# Patient Record
Sex: MALE | Race: OTHER | Employment: OTHER | URBAN - METROPOLITAN AREA
[De-identification: names, ages, dates, MRNs, and addresses within clinical notes are randomized per-mention and may not be internally consistent; named-entity substitution may affect disease eponyms.]

---

## 2021-06-15 ENCOUNTER — OFFICE VISIT (OUTPATIENT)
Dept: INTERNAL MEDICINE CLINIC | Age: 81
End: 2021-06-15
Payer: MEDICARE

## 2021-06-15 VITALS
BODY MASS INDEX: 31.76 KG/M2 | OXYGEN SATURATION: 96 % | WEIGHT: 186 LBS | SYSTOLIC BLOOD PRESSURE: 124 MMHG | TEMPERATURE: 98 F | DIASTOLIC BLOOD PRESSURE: 68 MMHG | HEART RATE: 64 BPM | HEIGHT: 64 IN

## 2021-06-15 DIAGNOSIS — I10 ESSENTIAL HYPERTENSION: Primary | ICD-10-CM

## 2021-06-15 DIAGNOSIS — M47.817 OSTEOARTHRITIS OF LUMBOSACRAL SPINE: ICD-10-CM

## 2021-06-15 DIAGNOSIS — Z96.653 HISTORY OF BILATERAL KNEE REPLACEMENT: ICD-10-CM

## 2021-06-15 DIAGNOSIS — M47.12 OSTEOARTHRITIS OF CERVICAL SPINE WITH MYELOPATHY: ICD-10-CM

## 2021-06-15 PROCEDURE — 99205 OFFICE O/P NEW HI 60 MIN: CPT | Performed by: INTERNAL MEDICINE

## 2021-06-15 RX ORDER — GABAPENTIN 400 MG/1
400 CAPSULE ORAL 3 TIMES DAILY
COMMUNITY
End: 2021-07-13 | Stop reason: SDUPTHER

## 2021-06-15 RX ORDER — CELECOXIB 100 MG/1
100 CAPSULE ORAL 2 TIMES DAILY
COMMUNITY
End: 2021-06-15 | Stop reason: ALTCHOICE

## 2021-06-15 RX ORDER — HYDROCODONE BITARTRATE AND ACETAMINOPHEN 10; 325 MG/1; MG/1
1 TABLET ORAL EVERY 6 HOURS PRN
Qty: 120 TABLET | Refills: 0 | Status: SHIPPED | OUTPATIENT
Start: 2021-06-15 | End: 2021-07-13 | Stop reason: SDUPTHER

## 2021-06-15 RX ORDER — ALBUTEROL SULFATE 90 UG/1
2 AEROSOL, METERED RESPIRATORY (INHALATION) EVERY 4 HOURS PRN
COMMUNITY
Start: 2021-04-19 | End: 2021-07-01 | Stop reason: SDUPTHER

## 2021-06-15 RX ORDER — HYDROCODONE BITARTRATE AND ACETAMINOPHEN 10; 325 MG/1; MG/1
1 TABLET ORAL EVERY 6 HOURS PRN
COMMUNITY
End: 2021-06-15 | Stop reason: SDUPTHER

## 2021-06-15 RX ORDER — LISINOPRIL 20 MG/1
20 TABLET ORAL DAILY
COMMUNITY
End: 2021-07-13 | Stop reason: SDUPTHER

## 2021-06-15 RX ORDER — CELECOXIB 100 MG/1
100 CAPSULE ORAL 2 TIMES DAILY
COMMUNITY
Start: 2021-05-13 | End: 2021-07-21 | Stop reason: ALTCHOICE

## 2021-06-15 RX ORDER — SIMVASTATIN 40 MG
40 TABLET ORAL NIGHTLY
COMMUNITY
End: 2021-07-13 | Stop reason: SDUPTHER

## 2021-06-15 RX ORDER — ALBUTEROL SULFATE 1.25 MG/3ML
1 SOLUTION RESPIRATORY (INHALATION) EVERY 6 HOURS PRN
COMMUNITY

## 2021-06-15 SDOH — ECONOMIC STABILITY: FOOD INSECURITY: WITHIN THE PAST 12 MONTHS, THE FOOD YOU BOUGHT JUST DIDN'T LAST AND YOU DIDN'T HAVE MONEY TO GET MORE.: NEVER TRUE

## 2021-06-15 SDOH — ECONOMIC STABILITY: FOOD INSECURITY: WITHIN THE PAST 12 MONTHS, YOU WORRIED THAT YOUR FOOD WOULD RUN OUT BEFORE YOU GOT MONEY TO BUY MORE.: NEVER TRUE

## 2021-06-15 SDOH — HEALTH STABILITY: PHYSICAL HEALTH: ON AVERAGE, HOW MANY MINUTES DO YOU ENGAGE IN EXERCISE AT THIS LEVEL?: 0 MIN

## 2021-06-15 SDOH — ECONOMIC STABILITY: TRANSPORTATION INSECURITY
IN THE PAST 12 MONTHS, HAS THE LACK OF TRANSPORTATION KEPT YOU FROM MEDICAL APPOINTMENTS OR FROM GETTING MEDICATIONS?: NO

## 2021-06-15 SDOH — ECONOMIC STABILITY: TRANSPORTATION INSECURITY
IN THE PAST 12 MONTHS, HAS LACK OF TRANSPORTATION KEPT YOU FROM MEETINGS, WORK, OR FROM GETTING THINGS NEEDED FOR DAILY LIVING?: NO

## 2021-06-15 SDOH — HEALTH STABILITY: PHYSICAL HEALTH: ON AVERAGE, HOW MANY DAYS PER WEEK DO YOU ENGAGE IN MODERATE TO STRENUOUS EXERCISE (LIKE A BRISK WALK)?: 0 DAYS

## 2021-06-15 ASSESSMENT — PATIENT HEALTH QUESTIONNAIRE - PHQ9
SUM OF ALL RESPONSES TO PHQ9 QUESTIONS 1 & 2: 0
SUM OF ALL RESPONSES TO PHQ QUESTIONS 1-9: 0
1. LITTLE INTEREST OR PLEASURE IN DOING THINGS: 0
SUM OF ALL RESPONSES TO PHQ QUESTIONS 1-9: 0
SUM OF ALL RESPONSES TO PHQ QUESTIONS 1-9: 0
2. FEELING DOWN, DEPRESSED OR HOPELESS: 0

## 2021-06-15 ASSESSMENT — LIFESTYLE VARIABLES
HOW OFTEN DO YOU HAVE A DRINK CONTAINING ALCOHOL: 2-3 TIMES A WEEK
HOW MANY STANDARD DRINKS CONTAINING ALCOHOL DO YOU HAVE ON A TYPICAL DAY: 1 OR 2

## 2021-06-15 ASSESSMENT — SOCIAL DETERMINANTS OF HEALTH (SDOH): HOW HARD IS IT FOR YOU TO PAY FOR THE VERY BASICS LIKE FOOD, HOUSING, MEDICAL CARE, AND HEATING?: NOT HARD AT ALL

## 2021-06-15 NOTE — PROGRESS NOTES
Subjective:      Patient ID: Betty Escalante is a [de-identified] y.o. male. Hypertension  This is a chronic problem. The current episode started more than 1 year ago. The problem is unchanged. The problem is controlled. Associated symptoms include anxiety and neck pain. There are no associated agents to hypertension. Risk factors for coronary artery disease include dyslipidemia and obesity. Past treatments include ACE inhibitors. The current treatment provides moderate improvement. Review of Systems   Musculoskeletal: Positive for neck pain. Positive and Negative as described in HPI    Constitutional:  negative for  fevers, chills, sweats, fatigue, and weight loss  HEENT:  negative for vision or hearing changes,   Respiratory:  negative for shortness of breath, cough, or congestion  Cardiovascular:  negative for  chest pain, palpitations, edema  Gastrointestinal:  negative for nausea, vomiting, diarrhea, constipation, abdominal pain  Genitourinary:  negative for frequency, dysuria  Integument/Breast:  negative for rash, skin lesions,   Musculoskeletal: Chronic pain due to lumbar cervical and knee osteoarthritis has been on narcotic therapy for 15 to 20 years  Neurological:  negative for headaches, dizziness, numbness, pain and tingling extremities  Behavior/Psych:  negative for depression and anxiety  No family history on file. Social History     Socioeconomic History    Marital status:      Spouse name: None    Number of children: None    Years of education: None    Highest education level: None   Occupational History    None   Tobacco Use    Smoking status: Former Smoker     Packs/day: 1.50     Years: 20.00     Pack years: 30.00     Quit date:      Years since quittin.4    Smokeless tobacco: Never Used   Substance and Sexual Activity    Alcohol use:  Yes     Alcohol/week: 1.0 standard drinks     Types: 1 Glasses of wine per week     Comment: COUPLE TIMES A WEEK WITH DINNER    Drug use: knee replacement     3. Osteoarthritis of lumbosacral spine  HYDROcodone-acetaminophen (NORCO)  MG per tablet   4. Osteoarthritis of cervical spine with myelopathy  HYDROcodone-acetaminophen (NORCO)  MG per tablet     Return in about 4 weeks (around 7/13/2021) for Follow Up. Orders Placed This Encounter   Procedures    Lipid Panel     Order Specific Question:   Is Patient Fasting?/# of Hours     Answer:   fasting    Comprehensive Metabolic Panel     Standing Status:   Future     Standing Expiration Date:   6/15/2022    TSH without Reflex     Standing Status:   Future     Standing Expiration Date:   6/15/2022    Sedimentation rate, automated     Standing Status:   Future     Standing Expiration Date:   6/15/2022    C-Reactive Protein     Standing Status:   Future     Standing Expiration Date:   6/15/2022     Orders Placed This Encounter   Medications    HYDROcodone-acetaminophen (NORCO)  MG per tablet     Sig: Take 1 tablet by mouth every 6 hours as needed for Pain for up to 30 days. Dispense:  120 tablet     Refill:  0     Reduce doses taken as pain becomes manageable      Visit Information    Have you changed or started any medications since your last visit including any over-the-counter medicines, vitamins, or herbal medicines? no   Are you having any side effects from any of your medications? -  no  Have you stopped taking any of your medications? Is so, why? -  no    Have you seen any other physician or provider since your last visit? Yes - Records Obtained  Have you had any other diagnostic tests since your last visit? No  Have you been seen in the emergency room and/or had an admission to a hospital since we last saw you? No  Have you had your routine dental cleaning in the past 6 months? no    Have you activated your [employer] account? If not, what are your barriers?  No:      Patient Care Team:  Manju Giang MD as PCP - General (Internal Medicine)    Medical History Review  Past Medical, Family, and Social History reviewed and does not contribute to the patient presenting condition    Health Maintenance   Topic Date Due    COVID-19 Vaccine (1) Never done    DTaP/Tdap/Td vaccine (1 - Tdap) Never done    Shingles Vaccine (1 of 2) Never done    Pneumococcal 65+ years Vaccine (1 of 1 - PPSV23) Never done    Flu vaccine (Season Ended) 09/01/2021    Hepatitis A vaccine  Aged Out    Hepatitis B vaccine  Aged Out    Hib vaccine  Aged Out    Meningococcal (ACWY) vaccine  Aged [employer] that he has been taking hydrocodone for his pain for the past 10 to 15 years he takes 4 pills/day he has no significant side effects from. His CBC done in May was within normal limits. Was advised that he is due for laboratory work-up including CMP lipid profile TSH sed rate CRP I will recheck him in 4 weeks.   It is to be noted the patient previous smoker has no history of significant alcohol use he just moved to [place] from [place]

## 2021-06-16 ENCOUNTER — HOSPITAL ENCOUNTER (OUTPATIENT)
Age: 81
Setting detail: SPECIMEN
Discharge: HOME | End: 2021-06-16
Payer: MEDICARE

## 2021-06-16 ENCOUNTER — TELEPHONE (OUTPATIENT)
Dept: INTERNAL MEDICINE CLINIC | Age: 81
End: 2021-06-16

## 2021-06-16 DIAGNOSIS — I10 ESSENTIAL HYPERTENSION: ICD-10-CM

## 2021-06-16 LAB
ALBUMIN SERPL-MCNC: 4.1 G/DL (ref 3.5–5.2)
ALBUMIN/GLOBULIN RATIO: 1.5 (ref 1–2.5)
ALP BLD-CCNC: 79 U/L (ref 40–129)
ALT SERPL-CCNC: 9 U/L (ref 5–41)
ANION GAP SERPL CALCULATED.3IONS-SCNC: 12 MMOL/L (ref 9–17)
AST SERPL-CCNC: 17 U/L
BILIRUB SERPL-MCNC: 0.26 MG/DL (ref 0.3–1.2)
BUN BLDV-MCNC: 16 MG/DL (ref 8–23)
BUN/CREAT BLD: ABNORMAL (ref 9–20)
C-REACTIVE PROTEIN: <3 MG/L (ref 0–5)
CALCIUM SERPL-MCNC: 9.1 MG/DL (ref 8.6–10.4)
CHLORIDE BLD-SCNC: 100 MMOL/L (ref 98–107)
CO2: 26 MMOL/L (ref 20–31)
CREAT SERPL-MCNC: 0.85 MG/DL (ref 0.7–1.2)
GFR AFRICAN AMERICAN: >60 ML/MIN
GFR NON-AFRICAN AMERICAN: >60 ML/MIN
GFR SERPL CREATININE-BSD FRML MDRD: ABNORMAL ML/MIN/{1.73_M2}
GFR SERPL CREATININE-BSD FRML MDRD: ABNORMAL ML/MIN/{1.73_M2}
GLUCOSE BLD-MCNC: 92 MG/DL (ref 70–99)
POTASSIUM SERPL-SCNC: 4.8 MMOL/L (ref 3.7–5.3)
SEDIMENTATION RATE, ERYTHROCYTE: 4 MM (ref 0–20)
SODIUM BLD-SCNC: 138 MMOL/L (ref 135–144)
TOTAL PROTEIN: 6.8 G/DL (ref 6.4–8.3)
TSH SERPL DL<=0.05 MIU/L-ACNC: 1.39 MIU/L (ref 0.3–5)

## 2021-06-17 NOTE — TELEPHONE ENCOUNTER
Letter written and placed at the  for pickup. Patient notified and verbalized understanding. He agreed to  letter today or tomorrow.

## 2021-06-28 NOTE — TELEPHONE ENCOUNTER
Medication: Advair  Last visit: 6/15/21  Next visit: 7/21/2021  Last refill: Not filled by our office before  Pharmacy: [address] [facility] pt also needs a prescription to go to [facility] for [employer] however I don't see on medication list.    Please advise.

## 2021-06-30 ENCOUNTER — TELEPHONE (OUTPATIENT)
Dept: INTERNAL MEDICINE CLINIC | Age: 81
End: 2021-06-30

## 2021-06-30 NOTE — TELEPHONE ENCOUNTER
[facility] calling to inform that the albuterol inhaler Is requiring a prior authorization.   PA #[id number]

## 2021-07-01 RX ORDER — ALBUTEROL SULFATE 90 UG/1
2 AEROSOL, METERED RESPIRATORY (INHALATION) 4 TIMES DAILY
Qty: 1 INHALER | Refills: 2 | Status: SHIPPED | OUTPATIENT
Start: 2021-07-01 | End: 2021-07-13 | Stop reason: SDUPTHER

## 2021-07-01 NOTE — TELEPHONE ENCOUNTER
----- Message from Kevin Narayanan MA sent at 7/1/2021  9:20 AM EDT -----  Subject: Refill Request    QUESTIONS  Name of Medication? albuterol sulfate  (90 Base) MCG/ACT inhaler  Patient-reported dosage and instructions? 108  How many days do you have left? 2  Preferred Pharmacy? [address] phone number (if available)? [phone]  ---------------------------------------------------------------------------  --------------  CALL BACK INFO  What is the best way for the office to contact you? OK to leave message on   voicemail  Preferred Call Back Phone Number?  [phone]

## 2021-07-02 NOTE — TELEPHONE ENCOUNTER
LVM for patient to contact pharmacy or insurance to ask for an alternative.  Advised to return office call with alternative and I will send for provider approval.

## 2021-07-08 DIAGNOSIS — F17.200 TOBACCO DEPENDENCY: Primary | ICD-10-CM

## 2021-07-08 NOTE — TELEPHONE ENCOUNTER
[facility] requesting clarification for Advair. Rx is 1 puff daily, Pharmacy states this medication is typically 1 puff BID. Is the original Rx correct?     Please advise

## 2021-07-13 DIAGNOSIS — M47.12 OSTEOARTHRITIS OF CERVICAL SPINE WITH MYELOPATHY: ICD-10-CM

## 2021-07-13 DIAGNOSIS — M47.817 OSTEOARTHRITIS OF LUMBOSACRAL SPINE: ICD-10-CM

## 2021-07-13 RX ORDER — GABAPENTIN 400 MG/1
400 CAPSULE ORAL 3 TIMES DAILY
Qty: 90 CAPSULE | Refills: 1 | Status: SHIPPED | OUTPATIENT
Start: 2021-07-13 | End: 2021-09-14

## 2021-07-13 RX ORDER — ALBUTEROL SULFATE 90 UG/1
2 AEROSOL, METERED RESPIRATORY (INHALATION) 4 TIMES DAILY
Qty: 3 INHALER | Refills: 3 | Status: SHIPPED | OUTPATIENT
Start: 2021-07-13 | End: 2022-05-05

## 2021-07-13 RX ORDER — SIMVASTATIN 40 MG
40 TABLET ORAL NIGHTLY
Qty: 90 TABLET | Refills: 1 | Status: SHIPPED | OUTPATIENT
Start: 2021-07-13 | End: 2021-12-29

## 2021-07-13 RX ORDER — HYDROCODONE BITARTRATE AND ACETAMINOPHEN 10; 325 MG/1; MG/1
1 TABLET ORAL EVERY 6 HOURS PRN
Qty: 120 TABLET | Refills: 0 | Status: SHIPPED | OUTPATIENT
Start: 2021-07-13 | End: 2021-08-14 | Stop reason: SDUPTHER

## 2021-07-13 RX ORDER — LISINOPRIL 20 MG/1
20 TABLET ORAL DAILY
Qty: 90 TABLET | Refills: 1 | Status: SHIPPED | OUTPATIENT
Start: 2021-07-13 | End: 2021-12-29

## 2021-07-21 ENCOUNTER — OFFICE VISIT (OUTPATIENT)
Dept: INTERNAL MEDICINE CLINIC | Age: 81
End: 2021-07-21
Payer: MEDICARE

## 2021-07-21 VITALS
OXYGEN SATURATION: 97 % | HEART RATE: 60 BPM | SYSTOLIC BLOOD PRESSURE: 122 MMHG | BODY MASS INDEX: 30.73 KG/M2 | DIASTOLIC BLOOD PRESSURE: 62 MMHG | WEIGHT: 180 LBS | HEIGHT: 64 IN

## 2021-07-21 DIAGNOSIS — I10 ESSENTIAL HYPERTENSION: ICD-10-CM

## 2021-07-21 DIAGNOSIS — Z13.220 SCREENING FOR HYPERLIPIDEMIA: Primary | ICD-10-CM

## 2021-07-21 DIAGNOSIS — M47.12 OSTEOARTHRITIS OF CERVICAL SPINE WITH MYELOPATHY: ICD-10-CM

## 2021-07-21 DIAGNOSIS — M47.817 OSTEOARTHRITIS OF LUMBOSACRAL SPINE: ICD-10-CM

## 2021-07-21 PROCEDURE — 99214 OFFICE O/P EST MOD 30 MIN: CPT | Performed by: INTERNAL MEDICINE

## 2021-07-21 PROCEDURE — G8417 CALC BMI ABV UP PARAM F/U: HCPCS | Performed by: INTERNAL MEDICINE

## 2021-07-21 PROCEDURE — 3288F FALL RISK ASSESSMENT DOCD: CPT | Performed by: INTERNAL MEDICINE

## 2021-07-21 PROCEDURE — G8427 DOCREV CUR MEDS BY ELIG CLIN: HCPCS | Performed by: INTERNAL MEDICINE

## 2021-07-21 PROCEDURE — G0439 PPPS, SUBSEQ VISIT: HCPCS | Performed by: INTERNAL MEDICINE

## 2021-07-21 PROCEDURE — 1036F TOBACCO NON-USER: CPT | Performed by: INTERNAL MEDICINE

## 2021-07-21 PROCEDURE — 4040F PNEUMOC VAC/ADMIN/RCVD: CPT | Performed by: INTERNAL MEDICINE

## 2021-07-21 PROCEDURE — 1123F ACP DISCUSS/DSCN MKR DOCD: CPT | Performed by: INTERNAL MEDICINE

## 2021-07-21 ASSESSMENT — PATIENT HEALTH QUESTIONNAIRE - PHQ9
2. FEELING DOWN, DEPRESSED OR HOPELESS: 0
1. LITTLE INTEREST OR PLEASURE IN DOING THINGS: 0
SUM OF ALL RESPONSES TO PHQ QUESTIONS 1-9: 0
2. FEELING DOWN, DEPRESSED OR HOPELESS: 0
SUM OF ALL RESPONSES TO PHQ QUESTIONS 1-9: 0
SUM OF ALL RESPONSES TO PHQ9 QUESTIONS 1 & 2: 0
SUM OF ALL RESPONSES TO PHQ QUESTIONS 1-9: 0
SUM OF ALL RESPONSES TO PHQ9 QUESTIONS 1 & 2: 0
1. LITTLE INTEREST OR PLEASURE IN DOING THINGS: 0

## 2021-07-21 ASSESSMENT — ENCOUNTER SYMPTOMS
RESPIRATORY NEGATIVE: 1
GASTROINTESTINAL NEGATIVE: 1
EYES NEGATIVE: 1

## 2021-07-21 ASSESSMENT — LIFESTYLE VARIABLES
AUDIT TOTAL SCORE: INCOMPLETE
HOW OFTEN DO YOU HAVE A DRINK CONTAINING ALCOHOL: NEVER
AUDIT-C TOTAL SCORE: INCOMPLETE
HOW OFTEN DO YOU HAVE A DRINK CONTAINING ALCOHOL: 0

## 2021-07-21 NOTE — PROGRESS NOTES
Subjective:      Patient ID: Sunshine Vitale is a [de-identified] y.o. male. Hypertension  This is a chronic problem. The current episode started more than 1 year ago. The problem is unchanged. The problem is controlled. Associated symptoms include anxiety and neck pain. There are no associated agents to hypertension. Risk factors for coronary artery disease include dyslipidemia and obesity. Past treatments include ACE inhibitors. The current treatment provides moderate improvement. Review of Systems   Constitutional: Negative. HENT: Negative. Eyes: Negative. Respiratory: Negative. Cardiovascular: Negative. Gastrointestinal: Negative. Endocrine: Negative. Genitourinary: Negative. Musculoskeletal: Positive for neck pain. Objective:   Physical Exam hysical Exam CONSTITUTIONAL: Alert and oriented   HEENT: Atraumatic, conjunctiva normal colored, no jaundice  NECK: No thyroid enlargement, no lymphadenopathy, no JVD   CHEST: Breath sounds normal, no wheezing, no rales   CVS: S1 S2 normal, no murmur, no gallop, no carotid bruit  ABDOMEN: Soft, non tender, no mass, no hepatosplenomegaly   NEUROLOGICAL: Alert, CN 2-12 intact, no motor deficits   EXTREMITIES: No pedal edema, no calf tenderness= osteoarthritis multiple joints including knees shoulders  cervical spine and lumbar region  VASCULAR: Aorta not palpable, femorals 2+,   SKIN: No obvious lesions or rash       Assessment / Plan:      Diagnosis Orders   1. Screening for hyperlipidemia  Lipid Panel   2. Essential hypertension     3. Osteoarthritis of cervical spine with myelopathy     4. Osteoarthritis of lumbosacral spine     Return in about 12 weeks (around 10/13/2021) for Follow Up. No orders of the defined types were placed in this encounter. No orders of the defined types were placed in this encounter.           Visit Information    Have you changed or started any medications since your last visit including any over-the-counter medicines, vitamins, or herbal medicines? no   Are you having any side effects from any of your medications? -  no  Have you stopped taking any of your medications? Is so, why? -  no    Have you seen any other physician or provider since your last visit? No  Have you had any other diagnostic tests since your last visit? No  Have you been seen in the emergency room and/or had an admission to a hospital since we last saw you? No  Have you had your routine dental cleaning in the past 6 months? no    Have you activated your [employer] account? If not, what are your barriers?  No:      Patient Care Team:  Colt Portillo MD as PCP - General (Internal Medicine)  Colt Portillo MD as PCP - [facility]    Medical History Review  Past Medical, Family, and Social History reviewed and does not contribute to the patient presenting condition    Health Maintenance   Topic Date Due    Lipid screen  Never done    COVID-19 Vaccine (1) Never done    DTaP/Tdap/Td vaccine (1 - Tdap) Never done    Shingles Vaccine (1 of 2) Never done    Pneumococcal 65+ years Vaccine (1 of 1 - PPSV23) Never done    Annual Wellness Visit (AWV)  Never done    Flu vaccine (1) 09/01/2021    Potassium monitoring  06/16/2022    Creatinine monitoring  06/16/2022    Hepatitis A vaccine  Aged Out    Hepatitis B vaccine  Aged Out    Hib vaccine  Aged Out    Meningococcal (ACWY) vaccine  Aged Out

## 2021-08-12 DIAGNOSIS — M47.12 OSTEOARTHRITIS OF CERVICAL SPINE WITH MYELOPATHY: ICD-10-CM

## 2021-08-12 DIAGNOSIS — M47.817 OSTEOARTHRITIS OF LUMBOSACRAL SPINE: ICD-10-CM

## 2021-08-12 NOTE — TELEPHONE ENCOUNTER
----- Message from Lorie Gerald sent at 8/12/2021 12:24 PM EDT -----  Subject: Refill Request    QUESTIONS  Name of Medication? HYDROcodone-acetaminophen (NORCO)  MG per tablet  Patient-reported dosage and instructions? taking  mg, 1 every 5-6   hours  How many days do you have left? 4  Preferred Pharmacy? [facility]  Pharmacy phone number (if available)? [phone]  ---------------------------------------------------------------------------  --------------  CALL BACK INFO  What is the best way for the office to contact you? OK to leave message on   voicemail  Preferred Call Back Phone Number?  [phone]

## 2021-08-14 RX ORDER — HYDROCODONE BITARTRATE AND ACETAMINOPHEN 10; 325 MG/1; MG/1
1 TABLET ORAL EVERY 6 HOURS PRN
Qty: 120 TABLET | Refills: 0 | Status: SHIPPED | OUTPATIENT
Start: 2021-08-14 | End: 2021-08-16 | Stop reason: SDUPTHER

## 2021-08-16 DIAGNOSIS — M47.817 OSTEOARTHRITIS OF LUMBOSACRAL SPINE: ICD-10-CM

## 2021-08-16 DIAGNOSIS — M47.12 OSTEOARTHRITIS OF CERVICAL SPINE WITH MYELOPATHY: ICD-10-CM

## 2021-08-16 RX ORDER — HYDROCODONE BITARTRATE AND ACETAMINOPHEN 10; 325 MG/1; MG/1
1 TABLET ORAL EVERY 6 HOURS PRN
Qty: 120 TABLET | Refills: 0 | Status: SHIPPED | OUTPATIENT
Start: 2021-08-16 | End: 2021-09-16 | Stop reason: SDUPTHER

## 2021-08-16 NOTE — TELEPHONE ENCOUNTER
This was approved on 08/14/21 but sent to [facility] mail order and it was supposed to be sent to [facility]. I spoke to [facility] and this has not been processed yet so I cancelled it with [facility]. The pharmacy was changed. [facility] approve. Patient is out of medication.

## 2021-08-20 PROBLEM — Z13.220 SCREENING FOR HYPERLIPIDEMIA: Status: RESOLVED | Noted: 2021-07-21 | Resolved: 2021-08-20

## 2021-09-14 RX ORDER — GABAPENTIN 400 MG/1
CAPSULE ORAL
Qty: 180 CAPSULE | Refills: 5 | Status: SHIPPED | OUTPATIENT
Start: 2021-09-14 | End: 2021-10-19

## 2021-09-16 DIAGNOSIS — M47.12 OSTEOARTHRITIS OF CERVICAL SPINE WITH MYELOPATHY: ICD-10-CM

## 2021-09-16 DIAGNOSIS — M47.817 OSTEOARTHRITIS OF LUMBOSACRAL SPINE: ICD-10-CM

## 2021-09-16 RX ORDER — HYDROCODONE BITARTRATE AND ACETAMINOPHEN 10; 325 MG/1; MG/1
1 TABLET ORAL EVERY 6 HOURS PRN
Qty: 120 TABLET | Refills: 0 | Status: SHIPPED | OUTPATIENT
Start: 2021-09-16 | End: 2021-10-15 | Stop reason: SDUPTHER

## 2021-10-12 ENCOUNTER — HOSPITAL ENCOUNTER (OUTPATIENT)
Age: 81
Setting detail: SPECIMEN
Discharge: HOME | End: 2021-10-12
Payer: MEDICARE

## 2021-10-12 DIAGNOSIS — Z13.220 SCREENING FOR HYPERLIPIDEMIA: ICD-10-CM

## 2021-10-12 LAB
CHOLESTEROL/HDL RATIO: 2.2
CHOLESTEROL: 144 MG/DL
HDLC SERPL-MCNC: 65 MG/DL
LDL CHOLESTEROL: 53 MG/DL (ref 0–130)
TRIGL SERPL-MCNC: 128 MG/DL
VLDLC SERPL CALC-MCNC: NORMAL MG/DL (ref 1–30)

## 2021-10-15 DIAGNOSIS — M47.12 OSTEOARTHRITIS OF CERVICAL SPINE WITH MYELOPATHY: ICD-10-CM

## 2021-10-15 DIAGNOSIS — M47.817 OSTEOARTHRITIS OF LUMBOSACRAL SPINE: ICD-10-CM

## 2021-10-15 RX ORDER — HYDROCODONE BITARTRATE AND ACETAMINOPHEN 10; 325 MG/1; MG/1
1 TABLET ORAL EVERY 6 HOURS PRN
Qty: 120 TABLET | Refills: 0 | Status: SHIPPED | OUTPATIENT
Start: 2021-10-15 | End: 2021-10-18 | Stop reason: SDUPTHER

## 2021-10-15 NOTE — TELEPHONE ENCOUNTER
Medication Requested: Margarita Irby    Last visit: 7/21/21  Next visit: 10/19/2021  Last refill: 9/16/21    Med contract on file:  [] yes   [x] no    Last urine drug screen: None found    Consistent with medication(s):    [] yes   [] no    Last OARRS ran: None Found    Quantity of medication remaining: a couple    Who will be picking rx up: pt    Pharmacy if escribed: [facility]

## 2021-10-18 DIAGNOSIS — M47.817 OSTEOARTHRITIS OF LUMBOSACRAL SPINE: ICD-10-CM

## 2021-10-18 DIAGNOSIS — M47.12 OSTEOARTHRITIS OF CERVICAL SPINE WITH MYELOPATHY: ICD-10-CM

## 2021-10-18 RX ORDER — HYDROCODONE BITARTRATE AND ACETAMINOPHEN 10; 325 MG/1; MG/1
1 TABLET ORAL EVERY 6 HOURS PRN
Qty: 120 TABLET | Refills: 0 | Status: SHIPPED | OUTPATIENT
Start: 2021-10-18 | End: 2021-11-17 | Stop reason: SDUPTHER

## 2021-10-19 ENCOUNTER — OFFICE VISIT (OUTPATIENT)
Dept: INTERNAL MEDICINE CLINIC | Age: 81
End: 2021-10-19
Payer: MEDICARE

## 2021-10-19 VITALS
WEIGHT: 187 LBS | HEART RATE: 75 BPM | OXYGEN SATURATION: 99 % | DIASTOLIC BLOOD PRESSURE: 72 MMHG | BODY MASS INDEX: 32.1 KG/M2 | SYSTOLIC BLOOD PRESSURE: 138 MMHG

## 2021-10-19 DIAGNOSIS — M47.817 OSTEOARTHRITIS OF LUMBOSACRAL SPINE: ICD-10-CM

## 2021-10-19 DIAGNOSIS — M47.12 OSTEOARTHRITIS OF CERVICAL SPINE WITH MYELOPATHY: ICD-10-CM

## 2021-10-19 DIAGNOSIS — I10 ESSENTIAL HYPERTENSION: Primary | ICD-10-CM

## 2021-10-19 PROCEDURE — 99214 OFFICE O/P EST MOD 30 MIN: CPT | Performed by: INTERNAL MEDICINE

## 2021-10-19 PROCEDURE — G8484 FLU IMMUNIZE NO ADMIN: HCPCS | Performed by: INTERNAL MEDICINE

## 2021-10-19 PROCEDURE — 1036F TOBACCO NON-USER: CPT | Performed by: INTERNAL MEDICINE

## 2021-10-19 PROCEDURE — 1123F ACP DISCUSS/DSCN MKR DOCD: CPT | Performed by: INTERNAL MEDICINE

## 2021-10-19 PROCEDURE — G8417 CALC BMI ABV UP PARAM F/U: HCPCS | Performed by: INTERNAL MEDICINE

## 2021-10-19 PROCEDURE — G8427 DOCREV CUR MEDS BY ELIG CLIN: HCPCS | Performed by: INTERNAL MEDICINE

## 2021-10-19 PROCEDURE — 4040F PNEUMOC VAC/ADMIN/RCVD: CPT | Performed by: INTERNAL MEDICINE

## 2021-10-19 RX ORDER — GABAPENTIN 300 MG/1
300 CAPSULE ORAL 2 TIMES DAILY
Qty: 180 CAPSULE | Refills: 1 | Status: SHIPPED | OUTPATIENT
Start: 2021-10-19 | End: 2022-04-17

## 2021-10-19 ASSESSMENT — ENCOUNTER SYMPTOMS
GASTROINTESTINAL NEGATIVE: 1
RESPIRATORY NEGATIVE: 1
EYES NEGATIVE: 1

## 2021-10-19 NOTE — PROGRESS NOTES
Subjective:      Patient ID: Eulalio Boyle is a 80 y.o. male. Continues to have cervical pain due to cervical osteoarthritis. He also has lumbosacral osteoarthritis he is on narcotics plus gabapentin. I will do my best to reduce the dosage    Hypertension  This is a chronic problem. The current episode started more than 1 year ago. The problem is unchanged. The problem is controlled. Associated symptoms include anxiety and neck pain. There are no associated agents to hypertension. Risk factors for coronary artery disease include dyslipidemia and obesity. Past treatments include ACE inhibitors. The current treatment provides moderate improvement. Review of Systems   Constitutional: Negative. HENT: Negative. Eyes: Negative. Respiratory: Negative. Cardiovascular: Negative. Gastrointestinal: Negative. Endocrine: Negative. Genitourinary: Negative. Musculoskeletal: Positive for neck pain. Objective:   Physical Exam Physical Exam hysical Exam CONSTITUTIONAL: Alert and oriented   HEENT: Atraumatic, conjunctiva normal colored, no jaundice  NECK: No thyroid enlargement, no lymphadenopathy, no JVD   CHEST: Breath sounds normal, no wheezing, no rales   CVS: S1 S2 normal, no murmur, no gallop, no carotid bruit  ABDOMEN: Soft, non tender, no mass, no hepatosplenomegaly   NEUROLOGICAL: Alert, CN 2-12 intact, no motor deficits   EXTREMITIES: No pedal edema, no calf tenderness= osteoarthritis multiple joints including knees shoulders  cervical spine and lumbar region  VASCULAR: Aorta not palpable, femorals 2+,   SKIN: No obvious lesions or rash       Assessment / Plan:      Diagnosis Orders   1. Essential hypertension = controlled    2. Osteoarthritis of cervical spine with myelopathy     3. Osteoarthritis of lumbosacral spine = requires pain medication plus gabapentin         Return in about 12 weeks (around 1/11/2022) for Follow Up.   No orders of the defined types were placed in this encounter. Orders Placed This Encounter   Medications    gabapentin (NEURONTIN) 300 MG capsule     Sig: Take 1 capsule by mouth 2 times daily for 180 days. Intended supply: 90 days     Dispense:  180 capsule     Refill:  1      Visit Information    Have you changed or started any medications since your last visit including any over-the-counter medicines, vitamins, or herbal medicines? no   Are you having any side effects from any of your medications? -  no  Have you stopped taking any of your medications? Is so, why? -  no    Have you seen any other physician or provider since your last visit? No  Have you had any other diagnostic tests since your last visit? No  Have you been seen in the emergency room and/or had an admission to a hospital since we last saw you? No  Have you had your routine dental cleaning in the past 6 months? no    Have you activated your [employer] account? If not, what are your barriers?  Yes     Patient Care Team:  Tanisha Cardenas MD as PCP - General (Internal Medicine)  Tanisha Cardenas MD as PCP - [facility]    Medical History Review  Past Medical, Family, and Social History reviewed and does not contribute to the patient presenting condition    Health Maintenance   Topic Date Due    COVID-19 Vaccine (1) Never done    DTaP/Tdap/Td vaccine (1 - Tdap) Never done    Shingles Vaccine (1 of 2) Never done    Pneumococcal 65+ years Vaccine (1 of 1 - PPSV23) Never done    Flu vaccine (1) Never done    Potassium monitoring  06/16/2022    Creatinine monitoring  06/16/2022    Annual Wellness Visit (AWV)  07/22/2022    Lipid screen  10/12/2022    Hepatitis A vaccine  Aged Out    Hepatitis B vaccine  Aged Out    Hib vaccine  Aged Out    Meningococcal (ACWY) vaccine  Aged Out

## 2021-11-17 DIAGNOSIS — M47.817 OSTEOARTHRITIS OF LUMBOSACRAL SPINE: ICD-10-CM

## 2021-11-17 DIAGNOSIS — M47.12 OSTEOARTHRITIS OF CERVICAL SPINE WITH MYELOPATHY: ICD-10-CM

## 2021-11-17 RX ORDER — HYDROCODONE BITARTRATE AND ACETAMINOPHEN 10; 325 MG/1; MG/1
1 TABLET ORAL EVERY 6 HOURS PRN
Qty: 120 TABLET | Refills: 0 | Status: SHIPPED | OUTPATIENT
Start: 2021-11-17 | End: 2021-12-17 | Stop reason: SDUPTHER

## 2021-11-17 NOTE — TELEPHONE ENCOUNTER
Medication Requested: norco    Last visit: 10/19/21  Next visit: Visit date not found  Last refill: 10/17/21    Med contract on file:  [] yes   [x] no    Last urine drug screen: 04/15/21  Consistent with medication(s):    [x] yes   [] no    Last OARRS ran: unk    Quantity of medication remainin days    Who will be picking rx up: self or wife    [facility]

## 2021-12-16 DIAGNOSIS — M47.12 OSTEOARTHRITIS OF CERVICAL SPINE WITH MYELOPATHY: ICD-10-CM

## 2021-12-16 DIAGNOSIS — M47.817 OSTEOARTHRITIS OF LUMBOSACRAL SPINE: ICD-10-CM

## 2021-12-16 NOTE — TELEPHONE ENCOUNTER
Medication Requested: norco    Last visit: 10/19/21    Next visit: 2022    Last refill: 21    Med contract on file:  [x] yes   [] no    Last urine drug screen: 04/15/21    Consistent with medication(s):    [x] yes   [] no    Last OARRS ran: unk    Quantity of medication remainin-4 pills    Who will be picking rx up: self or wife    Pharmacy if escribed: [facility]

## 2021-12-17 RX ORDER — HYDROCODONE BITARTRATE AND ACETAMINOPHEN 10; 325 MG/1; MG/1
1 TABLET ORAL EVERY 6 HOURS PRN
Qty: 120 TABLET | Refills: 0 | Status: SHIPPED | OUTPATIENT
Start: 2021-12-17 | End: 2022-01-17 | Stop reason: SDUPTHER

## 2021-12-23 ENCOUNTER — APPOINTMENT (OUTPATIENT)
Dept: CT IMAGING | Age: 81
DRG: 871 | End: 2021-12-23
Payer: MEDICARE

## 2021-12-23 ENCOUNTER — HOSPITAL ENCOUNTER (INPATIENT)
Age: 81
LOS: 4 days | Discharge: HOME HEALTH | DRG: 871 | End: 2021-12-27
Attending: STUDENT IN AN ORGANIZED HEALTH CARE EDUCATION/TRAINING PROGRAM | Admitting: INTERNAL MEDICINE
Payer: MEDICARE

## 2021-12-23 ENCOUNTER — APPOINTMENT (OUTPATIENT)
Dept: GENERAL RADIOLOGY | Age: 81
DRG: 871 | End: 2021-12-23
Payer: MEDICARE

## 2021-12-23 DIAGNOSIS — E87.5 HYPERKALEMIA: Primary | ICD-10-CM

## 2021-12-23 PROBLEM — N17.9 AKI (ACUTE KIDNEY INJURY) (HCC): Status: ACTIVE | Noted: 2021-12-23

## 2021-12-23 LAB
-: ABNORMAL
ABSOLUTE EOS #: 0 K/UL (ref 0–0.4)
ABSOLUTE IMMATURE GRANULOCYTE: ABNORMAL K/UL (ref 0–0.3)
ABSOLUTE LYMPH #: 1.2 K/UL (ref 1–4.8)
ABSOLUTE MONO #: 0.1 K/UL (ref 0.1–1.3)
ALBUMIN SERPL-MCNC: 4.3 G/DL (ref 3.5–5.2)
ALBUMIN/GLOBULIN RATIO: ABNORMAL (ref 1–2.5)
ALLEN TEST: ABNORMAL
ALP BLD-CCNC: 78 U/L (ref 40–129)
ALT SERPL-CCNC: 47 U/L (ref 5–41)
AMORPHOUS: ABNORMAL
ANION GAP SERPL CALCULATED.3IONS-SCNC: 15 MMOL/L (ref 9–17)
ANION GAP SERPL CALCULATED.3IONS-SCNC: 19 MMOL/L (ref 9–17)
AST SERPL-CCNC: 73 U/L
BACTERIA: ABNORMAL
BASOPHILS # BLD: 1 % (ref 0–2)
BASOPHILS ABSOLUTE: 0 K/UL (ref 0–0.2)
BILIRUB SERPL-MCNC: 0.25 MG/DL (ref 0.3–1.2)
BILIRUBIN URINE: NEGATIVE
BUN BLDV-MCNC: 61 MG/DL (ref 8–23)
BUN BLDV-MCNC: 65 MG/DL (ref 8–23)
BUN/CREAT BLD: ABNORMAL (ref 9–20)
BUN/CREAT BLD: ABNORMAL (ref 9–20)
CALCIUM SERPL-MCNC: 8.6 MG/DL (ref 8.6–10.4)
CALCIUM SERPL-MCNC: 9.1 MG/DL (ref 8.6–10.4)
CARBOXYHEMOGLOBIN: 0.6 % (ref 0–5)
CASTS UA: ABNORMAL /LPF
CHLORIDE BLD-SCNC: 101 MMOL/L (ref 98–107)
CHLORIDE BLD-SCNC: 93 MMOL/L (ref 98–107)
CO2: 17 MMOL/L (ref 20–31)
CO2: 19 MMOL/L (ref 20–31)
COLOR: YELLOW
COMMENT UA: ABNORMAL
CREAT SERPL-MCNC: 1.72 MG/DL (ref 0.7–1.2)
CREAT SERPL-MCNC: 2.08 MG/DL (ref 0.7–1.2)
CRYSTALS, UA: ABNORMAL /HPF
DIFFERENTIAL TYPE: ABNORMAL
EOSINOPHILS RELATIVE PERCENT: 0 % (ref 0–4)
EPITHELIAL CELLS UA: ABNORMAL /HPF
FIO2: ABNORMAL
GFR AFRICAN AMERICAN: 37 ML/MIN
GFR AFRICAN AMERICAN: 46 ML/MIN
GFR NON-AFRICAN AMERICAN: 31 ML/MIN
GFR NON-AFRICAN AMERICAN: 38 ML/MIN
GFR SERPL CREATININE-BSD FRML MDRD: ABNORMAL ML/MIN/{1.73_M2}
GLUCOSE BLD-MCNC: 213 MG/DL (ref 70–99)
GLUCOSE BLD-MCNC: 88 MG/DL (ref 70–99)
GLUCOSE URINE: NEGATIVE
HCO3 ARTERIAL: 15.8 MMOL/L (ref 22–26)
HCT VFR BLD CALC: 48.7 % (ref 41–53)
HEMOGLOBIN: 16 G/DL (ref 13.5–17.5)
IMMATURE GRANULOCYTES: ABNORMAL %
INFLUENZA A: NOT DETECTED
INFLUENZA B: NOT DETECTED
INR BLD: 1
KETONES, URINE: NEGATIVE
LACTIC ACID, SEPSIS WHOLE BLOOD: ABNORMAL MMOL/L (ref 0.5–1.9)
LACTIC ACID, SEPSIS: 4.2 MMOL/L (ref 0.5–1.9)
LACTIC ACID: 1.9 MMOL/L (ref 0.5–2.2)
LEUKOCYTE ESTERASE, URINE: NEGATIVE
LYMPHOCYTES # BLD: 27 % (ref 24–44)
MCH RBC QN AUTO: 29 PG (ref 26–34)
MCHC RBC AUTO-ENTMCNC: 32.8 G/DL (ref 31–37)
MCV RBC AUTO: 88.4 FL (ref 80–100)
METHEMOGLOBIN: 0.9 % (ref 0–1.9)
MODE: ABNORMAL
MONOCYTES # BLD: 3 % (ref 1–7)
MUCUS: ABNORMAL
NEGATIVE BASE EXCESS, ART: 9.3 MMOL/L (ref 0–2)
NITRITE, URINE: NEGATIVE
NOTIFICATION TIME: ABNORMAL
NOTIFICATION: ABNORMAL
NRBC AUTOMATED: ABNORMAL PER 100 WBC
O2 DEVICE/FLOW/%: ABNORMAL
O2 SAT, ARTERIAL: 93.7 % (ref 95–98)
OTHER OBSERVATIONS UA: ABNORMAL
OXYHEMOGLOBIN: ABNORMAL % (ref 95–98)
PARTIAL THROMBOPLASTIN TIME: 31.3 SEC (ref 24–36)
PATIENT TEMP: 37
PCO2 ARTERIAL: 26.4 MMHG (ref 35–45)
PCO2, ART, TEMP ADJ: ABNORMAL (ref 35–45)
PDW BLD-RTO: 13.2 % (ref 11.5–14.9)
PEEP/CPAP: ABNORMAL
PH ARTERIAL: 7.38 (ref 7.35–7.45)
PH UA: 5 (ref 5–8)
PH, ART, TEMP ADJ: ABNORMAL (ref 7.35–7.45)
PLATELET # BLD: 193 K/UL (ref 150–450)
PLATELET ESTIMATE: ABNORMAL
PMV BLD AUTO: 8.4 FL (ref 6–12)
PO2 ARTERIAL: 71.1 MMHG (ref 80–100)
PO2, ART, TEMP ADJ: ABNORMAL MMHG (ref 80–100)
POSITIVE BASE EXCESS, ART: ABNORMAL MMOL/L (ref 0–2)
POTASSIUM SERPL-SCNC: 4.8 MMOL/L (ref 3.7–5.3)
POTASSIUM SERPL-SCNC: 7.2 MMOL/L (ref 3.7–5.3)
PROTEIN UA: ABNORMAL
PROTHROMBIN TIME: 13.4 SEC (ref 11.8–14.6)
PSV: ABNORMAL
PT. POSITION: ABNORMAL
RBC # BLD: 5.51 M/UL (ref 4.5–5.9)
RBC # BLD: ABNORMAL 10*6/UL
RBC UA: ABNORMAL /HPF
RENAL EPITHELIAL, UA: ABNORMAL /HPF
RESPIRATORY RATE: 24
SAMPLE SITE: ABNORMAL
SARS-COV-2 RNA, RT PCR: DETECTED
SEG NEUTROPHILS: 69 % (ref 36–66)
SEGMENTED NEUTROPHILS ABSOLUTE COUNT: 3 K/UL (ref 1.3–9.1)
SET RATE: ABNORMAL
SODIUM BLD-SCNC: 129 MMOL/L (ref 135–144)
SODIUM BLD-SCNC: 135 MMOL/L (ref 135–144)
SOURCE: ABNORMAL
SPECIFIC GRAVITY UA: 1.02 (ref 1–1.03)
SPECIMEN DESCRIPTION: ABNORMAL
TEXT FOR RESPIRATORY: ABNORMAL
TOTAL CK: 1520 U/L (ref 39–308)
TOTAL HB: ABNORMAL G/DL (ref 12–16)
TOTAL PROTEIN: 7.6 G/DL (ref 6.4–8.3)
TOTAL RATE: ABNORMAL
TRICHOMONAS: ABNORMAL
TROPONIN INTERP: ABNORMAL
TROPONIN INTERP: ABNORMAL
TROPONIN T: ABNORMAL NG/ML
TROPONIN T: ABNORMAL NG/ML
TROPONIN, HIGH SENSITIVITY: 115 NG/L (ref 0–22)
TROPONIN, HIGH SENSITIVITY: 50 NG/L (ref 0–22)
TURBIDITY: ABNORMAL
URINE HGB: ABNORMAL
UROBILINOGEN, URINE: NORMAL
VT: ABNORMAL
WBC # BLD: 4.4 K/UL (ref 3.5–11)
WBC # BLD: ABNORMAL 10*3/UL
WBC UA: ABNORMAL /HPF
YEAST: ABNORMAL

## 2021-12-23 PROCEDURE — 51702 INSERT TEMP BLADDER CATH: CPT

## 2021-12-23 PROCEDURE — 96374 THER/PROPH/DIAG INJ IV PUSH: CPT

## 2021-12-23 PROCEDURE — 82805 BLOOD GASES W/O2 SATURATION: CPT

## 2021-12-23 PROCEDURE — 70450 CT HEAD/BRAIN W/O DYE: CPT

## 2021-12-23 PROCEDURE — 6360000002 HC RX W HCPCS: Performed by: STUDENT IN AN ORGANIZED HEALTH CARE EDUCATION/TRAINING PROGRAM

## 2021-12-23 PROCEDURE — 2060000000 HC ICU INTERMEDIATE R&B

## 2021-12-23 PROCEDURE — 6360000004 HC RX CONTRAST MEDICATION: Performed by: STUDENT IN AN ORGANIZED HEALTH CARE EDUCATION/TRAINING PROGRAM

## 2021-12-23 PROCEDURE — 85025 COMPLETE CBC W/AUTO DIFF WBC: CPT

## 2021-12-23 PROCEDURE — 2500000003 HC RX 250 WO HCPCS: Performed by: STUDENT IN AN ORGANIZED HEALTH CARE EDUCATION/TRAINING PROGRAM

## 2021-12-23 PROCEDURE — 76705 ECHO EXAM OF ABDOMEN: CPT

## 2021-12-23 PROCEDURE — 96375 TX/PRO/DX INJ NEW DRUG ADDON: CPT

## 2021-12-23 PROCEDURE — 85610 PROTHROMBIN TIME: CPT

## 2021-12-23 PROCEDURE — 99285 EMERGENCY DEPT VISIT HI MDM: CPT

## 2021-12-23 PROCEDURE — 6370000000 HC RX 637 (ALT 250 FOR IP): Performed by: STUDENT IN AN ORGANIZED HEALTH CARE EDUCATION/TRAINING PROGRAM

## 2021-12-23 PROCEDURE — 87086 URINE CULTURE/COLONY COUNT: CPT

## 2021-12-23 PROCEDURE — 85730 THROMBOPLASTIN TIME PARTIAL: CPT

## 2021-12-23 PROCEDURE — 93005 ELECTROCARDIOGRAM TRACING: CPT | Performed by: STUDENT IN AN ORGANIZED HEALTH CARE EDUCATION/TRAINING PROGRAM

## 2021-12-23 PROCEDURE — 83605 ASSAY OF LACTIC ACID: CPT

## 2021-12-23 PROCEDURE — 96361 HYDRATE IV INFUSION ADD-ON: CPT

## 2021-12-23 PROCEDURE — 84484 ASSAY OF TROPONIN QUANT: CPT

## 2021-12-23 PROCEDURE — 80048 BASIC METABOLIC PNL TOTAL CA: CPT

## 2021-12-23 PROCEDURE — 71260 CT THORAX DX C+: CPT

## 2021-12-23 PROCEDURE — 71045 X-RAY EXAM CHEST 1 VIEW: CPT

## 2021-12-23 PROCEDURE — 72170 X-RAY EXAM OF PELVIS: CPT

## 2021-12-23 PROCEDURE — 80053 COMPREHEN METABOLIC PANEL: CPT

## 2021-12-23 PROCEDURE — 87040 BLOOD CULTURE FOR BACTERIA: CPT

## 2021-12-23 PROCEDURE — 36600 WITHDRAWAL OF ARTERIAL BLOOD: CPT

## 2021-12-23 PROCEDURE — 2580000003 HC RX 258: Performed by: STUDENT IN AN ORGANIZED HEALTH CARE EDUCATION/TRAINING PROGRAM

## 2021-12-23 PROCEDURE — 72125 CT NECK SPINE W/O DYE: CPT

## 2021-12-23 PROCEDURE — 36415 COLL VENOUS BLD VENIPUNCTURE: CPT

## 2021-12-23 PROCEDURE — 2000000000 HC ICU R&B

## 2021-12-23 PROCEDURE — 81001 URINALYSIS AUTO W/SCOPE: CPT

## 2021-12-23 PROCEDURE — 82550 ASSAY OF CK (CPK): CPT

## 2021-12-23 PROCEDURE — 87636 SARSCOV2 & INF A&B AMP PRB: CPT

## 2021-12-23 RX ORDER — 0.9 % SODIUM CHLORIDE 0.9 %
30 INTRAVENOUS SOLUTION INTRAVENOUS ONCE
Status: COMPLETED | OUTPATIENT
Start: 2021-12-23 | End: 2021-12-23

## 2021-12-23 RX ORDER — DEXTROSE MONOHYDRATE 25 G/50ML
12.5 INJECTION, SOLUTION INTRAVENOUS PRN
Status: DISCONTINUED | OUTPATIENT
Start: 2021-12-23 | End: 2021-12-27 | Stop reason: HOSPADM

## 2021-12-23 RX ORDER — FUROSEMIDE 10 MG/ML
40 INJECTION INTRAMUSCULAR; INTRAVENOUS ONCE
Status: COMPLETED | OUTPATIENT
Start: 2021-12-23 | End: 2021-12-23

## 2021-12-23 RX ORDER — CALCIUM GLUCONATE 94 MG/ML
1000 INJECTION, SOLUTION INTRAVENOUS ONCE
Status: COMPLETED | OUTPATIENT
Start: 2021-12-23 | End: 2021-12-23

## 2021-12-23 RX ORDER — NICOTINE POLACRILEX 4 MG
15 LOZENGE BUCCAL PRN
Status: DISCONTINUED | OUTPATIENT
Start: 2021-12-23 | End: 2021-12-27 | Stop reason: HOSPADM

## 2021-12-23 RX ORDER — 0.9 % SODIUM CHLORIDE 0.9 %
80 INTRAVENOUS SOLUTION INTRAVENOUS ONCE
Status: COMPLETED | OUTPATIENT
Start: 2021-12-23 | End: 2021-12-23

## 2021-12-23 RX ORDER — HEPARIN SODIUM 1000 [USP'U]/ML
2000 INJECTION, SOLUTION INTRAVENOUS; SUBCUTANEOUS PRN
Status: DISCONTINUED | OUTPATIENT
Start: 2021-12-23 | End: 2021-12-26

## 2021-12-23 RX ORDER — DEXAMETHASONE SODIUM PHOSPHATE 10 MG/ML
10 INJECTION, SOLUTION INTRAMUSCULAR; INTRAVENOUS ONCE
Status: COMPLETED | OUTPATIENT
Start: 2021-12-23 | End: 2021-12-23

## 2021-12-23 RX ORDER — HEPARIN SODIUM 1000 [USP'U]/ML
4000 INJECTION, SOLUTION INTRAVENOUS; SUBCUTANEOUS ONCE
Status: COMPLETED | OUTPATIENT
Start: 2021-12-23 | End: 2021-12-23

## 2021-12-23 RX ORDER — SODIUM CHLORIDE 9 MG/ML
INJECTION, SOLUTION INTRAVENOUS CONTINUOUS
Status: DISCONTINUED | OUTPATIENT
Start: 2021-12-23 | End: 2021-12-26

## 2021-12-23 RX ORDER — DEXTROSE MONOHYDRATE 25 G/50ML
25 INJECTION, SOLUTION INTRAVENOUS ONCE
Status: COMPLETED | OUTPATIENT
Start: 2021-12-23 | End: 2021-12-23

## 2021-12-23 RX ORDER — DEXTROSE MONOHYDRATE 50 MG/ML
100 INJECTION, SOLUTION INTRAVENOUS PRN
Status: DISCONTINUED | OUTPATIENT
Start: 2021-12-23 | End: 2021-12-27 | Stop reason: HOSPADM

## 2021-12-23 RX ORDER — SODIUM CHLORIDE 0.9 % (FLUSH) 0.9 %
10 SYRINGE (ML) INJECTION PRN
Status: DISCONTINUED | OUTPATIENT
Start: 2021-12-23 | End: 2021-12-27 | Stop reason: HOSPADM

## 2021-12-23 RX ORDER — HEPARIN SODIUM 1000 [USP'U]/ML
4000 INJECTION, SOLUTION INTRAVENOUS; SUBCUTANEOUS PRN
Status: DISCONTINUED | OUTPATIENT
Start: 2021-12-23 | End: 2021-12-26

## 2021-12-23 RX ADMIN — CALCIUM GLUCONATE 1000 MG: 98 INJECTION, SOLUTION INTRAVENOUS at 18:19

## 2021-12-23 RX ADMIN — HEPARIN SODIUM 4000 UNITS: 1000 INJECTION INTRAVENOUS; SUBCUTANEOUS at 22:51

## 2021-12-23 RX ADMIN — Medication 10 UNITS: at 18:21

## 2021-12-23 RX ADMIN — SODIUM CHLORIDE 2544 ML: 9 INJECTION, SOLUTION INTRAVENOUS at 17:24

## 2021-12-23 RX ADMIN — DEXTROSE MONOHYDRATE 25 G: 25 INJECTION, SOLUTION INTRAVENOUS at 18:19

## 2021-12-23 RX ADMIN — DEXAMETHASONE SODIUM PHOSPHATE 10 MG: 10 INJECTION, SOLUTION INTRAMUSCULAR; INTRAVENOUS at 22:08

## 2021-12-23 RX ADMIN — IOPAMIDOL 75 ML: 755 INJECTION, SOLUTION INTRAVENOUS at 16:59

## 2021-12-23 RX ADMIN — SODIUM CHLORIDE 80 ML: 0.9 INJECTION, SOLUTION INTRAVENOUS at 17:03

## 2021-12-23 RX ADMIN — SODIUM CHLORIDE, PRESERVATIVE FREE 10 ML: 5 INJECTION INTRAVENOUS at 16:59

## 2021-12-23 RX ADMIN — SODIUM CHLORIDE: 9 INJECTION, SOLUTION INTRAVENOUS at 22:09

## 2021-12-23 RX ADMIN — CEFTRIAXONE SODIUM 1000 MG: 1 INJECTION, POWDER, FOR SOLUTION INTRAMUSCULAR; INTRAVENOUS at 22:10

## 2021-12-23 RX ADMIN — HEPARIN SODIUM 11.79 UNITS/KG/HR: 10000 INJECTION, SOLUTION INTRAVENOUS; SUBCUTANEOUS at 22:48

## 2021-12-23 RX ADMIN — FUROSEMIDE 40 MG: 10 INJECTION, SOLUTION INTRAVENOUS at 19:48

## 2021-12-23 ASSESSMENT — PAIN SCALES - GENERAL: PAINLEVEL_OUTOF10: 5

## 2021-12-23 ASSESSMENT — PAIN DESCRIPTION - LOCATION: LOCATION: ABDOMEN

## 2021-12-23 NOTE — FLOWSHEET NOTE
Writer responded to trauma alert; no family present. RN indicated no needs at this time. Writer will continue to follow.       12/23/21 7361   Encounter Summary   Services provided to: Patient   Referral/Consult From: Multi-disciplinary team   Support System Spouse   Continue Visiting   (12-23-21)   Complexity of Encounter Moderate   Length of Encounter 15 minutes   Crisis   Type Trauma   Assessment Unable to respond   Intervention Prayer

## 2021-12-23 NOTE — ED NOTES
Bed: 07A  Expected date:   Expected time:   Means of arrival:   Comments:  Brandy Betts RN  12/23/21 8810

## 2021-12-23 NOTE — ED PROVIDER NOTES
EMERGENCY DEPARTMENT ENCOUNTER   ATTENDING ATTESTATION     Pt Name: Zeina Burch  MRN: [id number]  [place] 1940  Date of evaluation: 12/23/21       Zeina Burch is a 80 y.o. male who presents with Fall      MDM:   44-year-old male history of hypertension, reported recent diagnosis of COVID-19 presents for evaluation after a fall. Patient came in via EMS. Reportedly was sitting on the toilet and fell forward and was lying on the ground for about an hour. Arrived via EMS tachypneic and tachycardic and febrile. Patient additionally had abdominal tenderness and pain. With fall and vital sign abnormalities to call a trauma alert. FAST exam was inconclusive due to body habitus. Traumatic work-up negative. Patient's Covid is positive. Patient is in acute renal failure with potassium of 7.2. Grewal catheter placed and patient is making urine. Plan for nephrology consult, calcium insulin D50, admission    Critical Care  30 minutes for management of acute renal failure, hyperkalemia    EKG  Sinus tachycardia rate of 124 left axis normal intervals there is some peaking of T waves no other concerning ST or T wave changes        Vitals:   Vitals:    12/23/21 1815 12/23/21 1830 12/23/21 1845 12/23/21 1900   BP: 139/73 (!) 145/73 (!) 143/79 138/79   Pulse: 113 117 123 115   Resp: 24 29 22 28   Temp:       TempSrc:       SpO2: 94% 97% 95%    Weight:             I personally evaluated and examined the patient in conjunction with the resident and agree with the assessment, treatment plan, and disposition of the patient as recorded by the resident. I performed a history and physical examination of the patient and discussed management with the resident. I reviewed the residents note and agree with the documented findings and plan of care. Any areas of disagreement are noted on the chart. I was personally present for the key portions of any procedures.  I have documented in the chart those procedures where I was not present during the key portions. I have personally reviewed all images and agree with the resident's interpretation. I have reviewed the emergency nurses triage note. I agree with the chief complaint, past medical history, past surgical history, allergies, medications, social and family history as documented unless otherwise noted. The care is provided during an unprecedented national emergency due to the novel coronavirus, COVID 19.   Jaxson Daily MD  Attending Emergency Physician            Jaxson Daily MD  12/23/21 [id number]

## 2021-12-23 NOTE — ED NOTES
Dr Benjamín Brannon and Megan Guallpa at bedside completing fast exam     Sandhya Her, RN  12/23/21 1647 Martinez Cameron, RN  12/23/21 1581

## 2021-12-23 NOTE — ED PROVIDER NOTES
[facility] ED  Emergency Department Encounter  Emergency Medicine Resident     Pt Name: Earl Gamboa  MRN: [id number]  [place] 1940  Date of evaluation: 12/23/21  PCP:  MD Huber Suárez       Chief Complaint   Patient presents with   [address]  (Location/Symptom, Timing/Onset, Context/Setting, Quality, Duration, Modifying Factors,Severity.)      Earl Gamboa is a 80 y.o. male with unknown past medical history who presents as a trauma alert via EMS with c-collar in place. Reportedly wife called out to EMS as she found patient face down in the tub with his pants down. Wife had not seen him in about an hour after he went to use the restroom. EMS found patient face down against the tub, confused although alert was hypoxic upon arrival at 82% on room air. Reportedly patient may have been diagnosed with Covid 7 days ago, wife is unsure of patient's medical history, unsure of anticoagulation status. Patient is confused although is alert and following commands. Has trauma to the head of ecchymosis to the forehead right frontal area, no lacerations noted. C-collar in place. Presents with tachycardia, febrile, mildly hypotensive, tachypneic    PAST MEDICAL / SURGICAL / SOCIAL / FAMILY HISTORY      has a past medical history of Arthritis, Asthma, Hyperlipemia, and Hypertension. has a past surgical history that includes Total knee arthroplasty. Social:  reports that he quit smoking about 36 years ago. He has a 30.00 pack-year smoking history. He has never used smokeless tobacco. He reports current alcohol use of about 1.0 standard drink of alcohol per week. He reports that he does not use drugs. Family Hx: History reviewed. No pertinent family history. Allergies:  Patient has no known allergies. Home Medications:  Prior to Admission medications    Medication Sig Start Date End Date Taking?  Authorizing Provider HYDROcodone-acetaminophen (NORCO)  MG per tablet Take 1 tablet by mouth every 6 hours as needed for Pain for up to 30 days. 12/17/21 1/16/22  Tanisha Cardenas MD   gabapentin (NEURONTIN) 300 MG capsule Take 1 capsule by mouth 2 times daily for 180 days. Intended supply: 90 days 10/19/21 4/17/22  Tanisha Cardenas MD   lisinopril (PRINIVIL;ZESTRIL) 20 MG tablet Take 1 tablet by mouth daily 7/13/21   Tanisha Cardenas MD   simvastatin (ZOCOR) 40 MG tablet Take 1 tablet by mouth nightly 7/13/21   Tanisha Cardenas MD   albuterol sulfate  (90 Base) MCG/ACT inhaler Inhale 2 puffs into the lungs 4 times daily 7/13/21 10/19/21  Tanisha Cardenas MD   fluticasone-salmeterol (ADVAIR) 250-50 MCG/DOSE AEPB Inhale 1 puff into the lungs 2 times daily 7/9/21   Tanisha Cardenas MD   albuterol (ACCUNEB) 1.25 MG/3ML nebulizer solution Inhale 1 ampule into the lungs every 6 hours as needed    Historical Provider, MD       REVIEW OFSYSTEMS    (2-9 systems for level 4, 10 or more for level 5)      Review of Systems   Unable to perform ROS: Acuity of condition       PHYSICAL EXAM   (up to 7 for level 4, 8 or more forlevel 5)      INITIAL VITALS:   Vitals:    12/23/21 2345   BP: (!) 154/78   Pulse: 107   Resp: (!) 31   Temp: 99.9 °F (37.7 °C)   SpO2: 96%        Physical Exam  Vitals and nursing note reviewed. Constitutional:       General: He is in acute distress. Appearance: He is ill-appearing. Comments: Elderly, chronically ill-appearing, fragile male   HENT:      Head: Normocephalic. Comments: Ecchymosis of right frontal region, no lacerations or abrasions noted     Right Ear: Tympanic membrane and ear canal normal.      Left Ear: Tympanic membrane and ear canal normal.      Ears:      Comments: No hemotympanum appreciated bilaterally     Nose: Nose normal.      Mouth/Throat:      Mouth: Mucous membranes are moist.      Pharynx: Oropharynx is clear.    Eyes:      Pupils: Pupils are equal, round, and reactive to light. Neck:      Comments: c-collar in place upon arrival  Cardiovascular:      Rate and Rhythm: Regular rhythm. Tachycardia present. Pulses: Normal pulses. Heart sounds: Normal heart sounds. Pulmonary:      Effort: Pulmonary effort is normal. No respiratory distress. Breath sounds: Normal breath sounds. No wheezing or rales. Comments: Equal breath sounds bilaterally, good air movement throughout, supplemental oxygen support in place at 2 L nasal cannula upon arrival.  Abdominal:      General: Abdomen is flat. There is no distension. Palpations: Abdomen is soft. Tenderness: There is no abdominal tenderness. There is no guarding or rebound. Musculoskeletal:      Right lower leg: No edema. Left lower leg: No edema. Skin:     General: Skin is warm and dry. Neurological:      Mental Status: He is alert.       Comments: Patient is keenly alert, protecting his airway, following commands although is oriented only to person, not place, not year         DIFFERENTIAL  DIAGNOSIS       DDX: Intra-abdominal injury versus subdural versus epidural versus intracranial abnormality versus altered mental status versus sepsis versus other traumatic injury versus electrolyte abnormality    Initial MDM/Plan: 80 y.o. male via EMS with reports of altered mental status and being found down after 1 hour in his bathroom, hypoxic upon EMS arrival. Upon my initial examination patient presents via EMS with c-collar in place, moving all extremities, ill-appearing, tachycardic, supplemental oxygen support of nonrebreather in place upon arrival, saturating 95% on nonrebreather, patient is keenly alert although is confused, only oriented to self, patient is protecting airway, following commands GCS is 15 upon arrival.    Initial physical exam remarkable for no hemotympanum noted bilaterally, c-collar in place, renal extremities, ecchymosis to the right forehead no lacerations or abrasions, patient is hypotensive upon arrival, FAST exam unremarkable. Patient with unknown medical history presents via EMS confused, due to hypotension and tachycardia and unknown medical history and patient's age trauma alert was called. Trauma surgery team at bedside immediately. IV establishment bilaterally. Fluid resuscitation initiated with 30 cc/kg bolus, patient is febrile upon arrival, sepsis orders as well as trauma work-up initiated. CT imaging of head, cervical spine, chest abdomen pelvis, Covid and influenza swab, chest x-ray, pelvic x-ray, CBC, CMP, coags, troponin, EKG. CT imaging unremarkable, no traumatic injuries noted. EMERGENCY DEPARTMENT COURSE:  ED Course as of 12/24/21 0322   Thu Dec 23, 2021   1719 Potassium(!!): 7.2  hyperkalemia noted. Calcium gluconate ordered, insulin D50 ordered, Grewal catheter to be placed. Will page nephrology.  [MA]   0910 CBC Auto Differential(!):    WBC 4.4   RBC 5.51   Hemoglobin Quant 16.0   Hematocrit 48.7   MCV 88.4   MCH 29.0   MCHC 32.8   RDW 13.2   Platelet Count 656   MPV 8.4   NRBC Automated NOT REPORTED   Differential Type NOT REPORTED   Seg Neutrophils 69(!)   Lymphocytes 27   Monocytes 3   Eosinophils % 0   Basophils 1   Immature Granulocytes NOT REPORTED   Segs Absolute 3.00   Absolute Lymph # 1.20   Absolute Mono # 0.10   Absolute Eos # 0.00   Basophils Absolute 0.00   Absolute Immature Granulocyte NOT REPORTED   WBC Morphology NOT REPORTED   RBC Morphology NOT REPORTED   Platelet Estimate NOT REPORTED  Within normal limits  [MA]   8203 Comprehensive Metabolic Panel w/ Reflex to MG(!):    Glucose 213(!)   BUN 65(!)   Creatinine 2.08(!)   Bun/Cre Ratio NOT REPORTED   CALCIUM, SERUM, 961303 9.1   Sodium 129(!)   Potassium PENDING   Chloride 93(!)   CO2 17(!)   Anion Gap 19(!)   Alk Phos 78   ALT 47(!)   AST 73(!)   Bilirubin 0.25(!)   Total Protein 7.6   Albumin 4.3   Albumin/Globulin Ratio NOT REPORTED   GFR Non- 31(!)   GFR  American 37(!)   GFR Comment        GFR Staging NOT REPORTED  DANIELLE noted IV fluids initiated already. [MA]   1746 CT Cervical Spine WO Contrast  IMPRESSION:  No acute fracture or traumatic malalignment of the cervical spine.     Multilevel degenerative changes. Anterior fusion from C5 through C7 [MA]   1747 CT CHEST ABDOMEN PELVIS W CONTRAST  IMPRESSION:  No acute traumatic abnormality detected within the chest, abdomen, and pelvis.     Mild diverticulosis of the large bowel is present without CT evidence of  diverticulitis.     Scattered small lucencies within the bony structures are seen diffusely. These may reflect advanced osteoporosis, but blood dyscrasias (such as  multiple myeloma) and metastatic disease remain in the differential.    [MA]   1747 XR PELVIS (1-2 VIEWS)  FINDINGS:  Bones appear demineralized. Ilioischial and iliopectineal lines are intact. SI joints and pubic symphysis are congruent. No acute soft tissue  abnormalities are identified.     IMPRESSION:  No acute abnormality detected within the pelvis.    [MA]   1747 Comprehensive Metabolic Panel w/ Reflex to MG(!):    Glucose 213(!)   BUN 65(!)   Creatinine 2.08(!)   Bun/Cre Ratio NOT REPORTED   CALCIUM, SERUM, 740310 9.1   Sodium 129(!)   Potassium PENDING   Chloride 93(!)   CO2 17(!)   Anion Gap 19(!)   Alk Phos 78   ALT 47(!)   AST 73(!)   Bilirubin 0.25(!)   Total Protein 7.6   Albumin 4.3   Albumin/Globulin Ratio NOT REPORTED   GFR Non- 31(!)   GFR  37(!)   GFR Comment        GFR Staging NOT REPORTED  Hyponatremia, hypochloremia [MA]   1811 Troponin(!):    Troponin, High Sensitivity 50(!)   Troponin T NOT REPORTED   Troponin Interp NOT REPORTED  Component elevated, suspect likely secondary to elevated creatinine and DANIELLE [MA]   1811 Reevaluated and tachycardia is improving with IV fluids, has received about half of 30 cc/kg bolus and rate is 112 currently.  [MA]   1839 Total CK(!): 1,520  elevated [MA] 36 Being placed now. Will repeat BMP, troponin and lactic patient is status post calcium, insulin and D50. [MA]   8614 Patient is making urine will give dose of IV lasix  [MA]   1916 Had Grewal placed, 400 cc of urine out since Grewal placement. [MA]   9725 Sher[facility] Martell lab again for labs to be drawn as they still have not been collected  [MA]   2008 WBC: 4.4 [MA]   2014 Discussed with Dr. Holly Kathleen is recommending repeat potassium. Upon admission recommendations 100 cc/h of normal saline for maintenance fluids. Pending repeat potassium will decide on need for dialysis. [MA]   2128 Troponin, High Sensitivity(!!): 115 [MA]   2129 Troponin(!!):    Troponin, High Sensitivity 115(!!)   Troponin T NOT REPORTED   Troponin Interp NOT REPORTED  Troponin elevated at 115 will repeat EKG  [MA]   0947 Basic Metabolic Prof(!):    Glucose 88   BUN 61(!)   Creatinine 1.72(!)   Bun/Cre Ratio NOT REPORTED   CALCIUM, SERUM, 247636 8.6   Sodium 135   Potassium 4.8   Chloride 101   CO2 19(!)   Anion Gap 15   GFR Non- 38(!)   GFR  46(!)   GFR Comment        GFR Staging NOT REPORTED  Potassium within normal limits now at 4.8, creatinine improving at 1.7. [MA]   2130 Lactic Acid:    Lactic Acid 1.9  Lactic acidosis improving [MA]   2130 Discussed with pulmonary team Dr. Clinton Cherry who is recommending decadron due to new oxygen requirement of 2L nasal cannula with covid. As well as 1 gram of recephin due to patient be febrile upon arrival.  [MA]   2132 Blood cultures pending. [MA]   2158 Cardiology Dr. Bob Spotted recommending heparin drip at this time [MA]      ED Course User Index  [MA] Lynn Villatoro DO      Patient stabilized in the emergency department, admitted to internal medicine team with nephrology, pulmonology, cardiology consultations. Patient admitted in guarded condition. Family updated multiple times throughout emergency department stay.     DIAGNOSTIC RESULTS / EMERGENCYDEPARTMENT COURSE / MDM LABS:  Labs Reviewed   COVID-19 & INFLUENZA COMBO - Abnormal; Notable for the following components:       Result Value    SARS-CoV-2 RNA, RT PCR DETECTED (*)     All other components within normal limits   BLOOD GAS, ARTERIAL - Abnormal; Notable for the following components:    pCO2, Arterial 26.4 (*)     pO2, Arterial 71.1 (*)     HCO3, Arterial 15.8 (*)     Negative Base Excess, Art 9.3 (*)     O2 Sat, Arterial 93.7 (*)     All other components within normal limits   CBC WITH AUTO DIFFERENTIAL - Abnormal; Notable for the following components:    Seg Neutrophils 69 (*)     All other components within normal limits   COMPREHENSIVE METABOLIC PANEL W/ REFLEX TO MG FOR LOW K - Abnormal; Notable for the following components:    Glucose 213 (*)     BUN 65 (*)     CREATININE 2.08 (*)     Sodium 129 (*)     Potassium 7.2 (*)     Chloride 93 (*)     CO2 17 (*)     Anion Gap 19 (*)     ALT 47 (*)     AST 73 (*)     Total Bilirubin 0.25 (*)     GFR Non- 31 (*)     GFR  37 (*)     All other components within normal limits   TROPONIN - Abnormal; Notable for the following components:    Troponin, High Sensitivity 50 (*)     All other components within normal limits   TROPONIN - Abnormal; Notable for the following components:    Troponin, High Sensitivity 115 (*)     All other components within normal limits   URINALYSIS - Abnormal; Notable for the following components:    Turbidity UA Cloudy (*)     Urine Hgb LARGE (*)     Protein, UA 1+ (*)     All other components within normal limits   LACTATE, SEPSIS - Abnormal; Notable for the following components:    Lactic Acid, Sepsis 4.2 (*)     All other components within normal limits   CK - Abnormal; Notable for the following components:     Total CK 1,520 (*)     All other components within normal limits   BASIC METABOLIC PANEL - Abnormal; Notable for the following components:    BUN 61 (*)     CREATININE 1.72 (*)     CO2 19 (*)     GFR Non- American 38 (*)     GFR  46 (*)     All other components within normal limits   MICROSCOPIC URINALYSIS - Abnormal; Notable for the following components:    Bacteria, UA MANY (*)     Amorphous, UA 2+ (*)     All other components within normal limits   CULTURE, BLOOD 1   CULTURE, BLOOD 1   C DIFF TOXIN/ANTIGEN   CULTURE, URINE   PROTIME-INR   APTT   LACTIC ACID   LACTATE, SEPSIS   TROPONIN   HEPARIN LEVEL/ANTI-XA   HEPARIN LEVEL/ANTI-XA   POCT GLUCOSE   POCT GLUCOSE   POCT GLUCOSE   POCT GLUCOSE   POCT GLUCOSE   POCT GLUCOSE   POCT GLUCOSE         RADIOLOGY:  XR PELVIS (1-2 VIEWS)    Result Date: 12/23/2021  EXAMINATION: ONE XRAY VIEW OF THE PELVIS 12/23/2021 1:39 pm COMPARISON: None. HISTORY: ORDERING SYSTEM PROVIDED HISTORY: trauma, tachycardic, hypotensive TECHNOLOGIST PROVIDED HISTORY: trauma, tachycardic, hypotensive Reason for Exam: trauma, tachycardic, hypotensive FINDINGS: Bones appear demineralized. Ilioischial and iliopectineal lines are intact. SI joints and pubic symphysis are congruent. No acute soft tissue abnormalities are identified. No acute abnormality detected within the pelvis. CT Head WO Contrast    Result Date: 12/23/2021  EXAMINATION: CT OF THE HEAD WITHOUT CONTRAST  12/23/2021 4:47 pm TECHNIQUE: CT of the head was performed without the administration of intravenous contrast. Dose modulation, iterative reconstruction, and/or weight based adjustment of the mA/kV was utilized to reduce the radiation dose to as low as reasonably achievable. COMPARISON: None.  HISTORY: ORDERING SYSTEM PROVIDED HISTORY: trauma, fall, hypotensive, tachycardic, AMS TECHNOLOGIST PROVIDED HISTORY: trauma, fall, hypotensive, tachycardic, AMS Decision Support Exception - unselect if not a suspected or confirmed emergency medical condition->Emergency Medical Condition (MA) Reason for Exam: trauma, fall, hypotensive, tachycardic, AMS FINDINGS: CT OF THE BRAIN: BRAIN/VENTRICLES: No mass effect, edema or hemorrhage is seen. Mild-to-moderate volume loss is seen brain with mild microvascular ischemic changes. No hydrocephalus or extra-axial fluid is seen. The ORBITS: The visualized portion of the orbits demonstrate no acute abnormality. SINUSES: The visualized paranasal sinuses and mastoid air cells demonstrate no acute abnormality. SOFT TISSUES/SKULL: No acute abnormality of the visualized skull or soft tissues. CT CERVICAL SPINE: BONES/ALIGNMENT: There is no acute fracture or traumatic malalignment. Status post anterior cervical discectomy with fusion from C5-C7. DEGENERATIVE CHANGES: Minimal anterolisthesis of1 C7 over T1. Small disc bulges at C3-4 and C4-5 result in mild central canal stenoses. Multilevel neural foraminal stenoses, worst (moderate) at the left C3-4 level. SOFT TISSUES: There is no prevertebral soft tissue swelling. CT head without contrast: 1. No skull fracture or acute intracranial abnormality. CT cervical spine without contrast: 1. No fracture or joint dislocation is seen. 2. Degenerative changes, as described. RECOMMENDATIONS: Unavailable     CT Cervical Spine WO Contrast    Result Date: 12/23/2021  EXAMINATION: CT OF THE CERVICAL SPINE WITHOUT CONTRAST 12/23/2021 4:47 pm TECHNIQUE: CT of the cervical spine was performed without the administration of intravenous contrast. Multiplanar reformatted images are provided for review. Dose modulation, iterative reconstruction, and/or weight based adjustment of the mA/kV was utilized to reduce the radiation dose to as low as reasonably achievable. COMPARISON: None.  HISTORY: ORDERING SYSTEM PROVIDED HISTORY: trauma, fall, hypotensive, tachycardic, AMS TECHNOLOGIST PROVIDED HISTORY: trauma, fall, hypotensive, tachycardic, AMS Decision Support Exception - unselect if not a suspected or confirmed emergency medical condition->Emergency Medical Condition (MA) Reason for Exam: trauma, fall, hypotensive, tachycardic, AMS FINDINGS: BONES/ALIGNMENT: There is a minimal degenerative anterolisthesis of C7 on T1. Vertebral body alignment is otherwise normal.  Bones are diffusely demineralized. Cervical vertebral body heights are normal.  Facet joints are normally aligned. There is no acute fracture or traumatic malalignment. DEGENERATIVE CHANGES: Anterior fusion surgery from C5 through C7 with anterior plate and fixation screws at each level. There is good bone fusion of the disc spaces. There is mild degenerative disc disease and mild left-greater-than-right facet arthropathy at C3-C4 and C4-C5. SOFT TISSUES: There is no prevertebral soft tissue swelling. No acute fracture or traumatic malalignment of the cervical spine. Multilevel degenerative changes. Anterior fusion from C5 through C7. RECOMMENDATIONS: Unavailable     XR CHEST PORTABLE    Result Date: 12/23/2021  EXAMINATION: ONE XRAY VIEW OF THE CHEST 12/23/2021 4:39 pm COMPARISON: None. HISTORY: ORDERING SYSTEM PROVIDED HISTORY: trauma, hypoxic TECHNOLOGIST PROVIDED HISTORY: trauma, hypoxic Reason for Exam: Trauma, hypoxic FINDINGS: No acute airspace infiltrate. No pneumothorax or pleural effusion. Mild cardiomegaly. Atherosclerotic calcification in the aortic arch. Status post bilateral shoulder arthroplasty. No acute cardiopulmonary disease     CT CHEST ABDOMEN PELVIS W CONTRAST    Result Date: 12/23/2021  EXAMINATION: CT OF THE CHEST, ABDOMEN, AND PELVIS WITH CONTRAST 12/23/2021 1:48 pm TECHNIQUE: CT of the chest, abdomen and pelvis was performed with the administration of intravenous contrast. Multiplanar reformatted images are provided for review. Dose modulation, iterative reconstruction, and/or weight based adjustment of the mA/kV was utilized to reduce the radiation dose to as low as reasonably achievable.  COMPARISON: None HISTORY: ORDERING SYSTEM PROVIDED HISTORY: trauma, fall, hypotensive, tachycardic, AMS TECHNOLOGIST PROVIDED HISTORY: trauma, fall, hypotensive, tachycardic, AMS Decision Support Exception - unselect if not a suspected or confirmed emergency medical condition->Emergency Medical Condition (MA) Reason for Exam: trauma, fall, hypotensive, tachycardic, AMS FINDINGS: Chest: Mediastinum: Visualized thyroid is unremarkable. No pathologically enlarged mediastinal or hilar lymph nodes are seen. The esophagus is unremarkable. Thoracic aorta is normal in caliber, without evidence of dissection. Very limited imaging of the pulmonary arteries is unremarkable. No central pulmonary embolism is detected. Lungs/pleura: There is extensive respiratory motion artifact, limiting evaluation. That said, no acute focal infiltrate is identified. Atelectasis in the right middle lobe is noted. No pneumothorax is found. No pleural effusion is seen. Soft Tissues/Bones: Visualized extra thoracic soft tissues are unremarkable in appearance. Scattered small lucencies are identified within the thoracic spine. No acute bony abnormality detected within the chest. Abdomen/Pelvis: There is moderate respiratory motion artifact, limiting evaluation of the hollow and solid abdominal viscera. Organs: That said, no acute hepatic abnormality identified. Portal vein is patent. Gallbladder is unremarkable. The spleen, adrenals, and pancreas are unremarkable. No acute or suspicious renal abnormality identified. GI/Bowel: Stomach is unremarkable. A duodenal diverticulum is noted, without CT evidence of diverticulitis. Otherwise, the duodenal sweep and the remainder of the small bowel are unremarkable. Mild diverticulosis of the large bowel is present without CT evidence of diverticulitis. Large bowel is otherwise unremarkable. The appendix is normal. Pelvis: Urinary bladder is decompressed. Mild to moderate prostatic hypertrophy. No free pelvic fluid. Peritoneum/Retroperitoneum: Moderate vascular calcifications within the abdominal aorta. The abdominal aorta is normal in caliber.   Superior mesenteric artery is enhancing. Bones/Soft Tissues: Scattered small lucencies are identified within lumbar vertebral bodies as well as within the sacrum. No acute bony abnormality detected within the abdomen and pelvis. The extra-abdominal and extra pelvic soft tissues are unremarkable. No acute traumatic abnormality detected within the chest, abdomen, and pelvis. Mild diverticulosis of the large bowel is present without CT evidence of diverticulitis. Scattered small lucencies within the bony structures are seen diffusely. These may reflect advanced osteoporosis, but blood dyscrasias (such as multiple myeloma) and metastatic disease remain in the differential.         EKG  Sinus tachycardia, axis within normal limits, intervals within normal limits including OK, QRS, QT/QTc. No ST segment elevation, no ST segment depression, no T wave abnormalities. PVC noted. Nonspecific EKG. All EKG's are interpreted by the Emergency Department Physicianwho either signs or Co-signs this chart in the absence of a cardiologist.        PROCEDURES:  None    CONSULTS:  IP CONSULT TO NEPHROLOGY  IP CONSULT TO INTERNAL MEDICINE  IP CONSULT TO TRAUMA SURGERY  IP CONSULT TO CARDIOLOGY      FINAL IMPRESSION      1. Hyperkalemia          DISPOSITION / PLAN     DISPOSITION Admitted 12/23/2021 08:28:16 PM      PATIENT REFERRED TO:  No follow-up provider specified.     DISCHARGE MEDICATIONS:  Current Discharge Medication List          Shari Mckenna DO  Emergency Medicine Resident    (Please note that portions of this note were completed with a voice recognition program.Efforts were made to edit the dictations but occasionally words are mis-transcribed.)       Shari Mckenna DO  Resident  12/24/21 [id number]

## 2021-12-24 ENCOUNTER — APPOINTMENT (OUTPATIENT)
Dept: NON INVASIVE DIAGNOSTICS | Age: 81
DRG: 871 | End: 2021-12-24
Payer: MEDICARE

## 2021-12-24 LAB
ANION GAP SERPL CALCULATED.3IONS-SCNC: 17 MMOL/L (ref 9–17)
ANTI-XA UNFRAC HEPARIN: 0.15 IU/L (ref 0.3–0.7)
ANTI-XA UNFRAC HEPARIN: 0.85 IU/L (ref 0.3–0.7)
ANTI-XA UNFRAC HEPARIN: <0.1 IU/L (ref 0.3–0.7)
BUN BLDV-MCNC: 57 MG/DL (ref 8–23)
BUN/CREAT BLD: ABNORMAL (ref 9–20)
C DIFF AG + TOXIN: NEGATIVE
C-REACTIVE PROTEIN: 49.9 MG/L (ref 0–5)
CALCIUM SERPL-MCNC: 8.2 MG/DL (ref 8.6–10.4)
CHLORIDE BLD-SCNC: 105 MMOL/L (ref 98–107)
CO2: 16 MMOL/L (ref 20–31)
CREAT SERPL-MCNC: 1.57 MG/DL (ref 0.7–1.2)
CULTURE: NO GROWTH
D-DIMER QUANTITATIVE: >20 MG/L FEU (ref 0–0.59)
EKG ATRIAL RATE: 124 BPM
EKG P AXIS: 72 DEGREES
EKG P-R INTERVAL: 196 MS
EKG Q-T INTERVAL: 296 MS
EKG QRS DURATION: 86 MS
EKG QTC CALCULATION (BAZETT): 425 MS
EKG R AXIS: -8 DEGREES
EKG T AXIS: 39 DEGREES
EKG VENTRICULAR RATE: 124 BPM
GFR AFRICAN AMERICAN: 52 ML/MIN
GFR NON-AFRICAN AMERICAN: 43 ML/MIN
GFR SERPL CREATININE-BSD FRML MDRD: ABNORMAL ML/MIN/{1.73_M2}
GFR SERPL CREATININE-BSD FRML MDRD: ABNORMAL ML/MIN/{1.73_M2}
GLUCOSE BLD-MCNC: 134 MG/DL (ref 70–99)
GLUCOSE BLD-MCNC: 144 MG/DL (ref 75–110)
GLUCOSE BLD-MCNC: 162 MG/DL (ref 75–110)
GLUCOSE BLD-MCNC: 166 MG/DL (ref 75–110)
HCT VFR BLD CALC: 50 % (ref 41–53)
HCT VFR BLD CALC: 50 % (ref 41–53)
HEMOGLOBIN: 16.6 G/DL (ref 13.5–17.5)
HEMOGLOBIN: 16.6 G/DL (ref 13.5–17.5)
LACTATE DEHYDROGENASE: 571 U/L (ref 135–225)
LV EF: 55 %
LVEF MODALITY: NORMAL
Lab: NORMAL
MCH RBC QN AUTO: 29.3 PG (ref 26–34)
MCHC RBC AUTO-ENTMCNC: 33.1 G/DL (ref 31–37)
MCV RBC AUTO: 88.6 FL (ref 80–100)
NRBC AUTOMATED: NORMAL PER 100 WBC
PDW BLD-RTO: 13.6 % (ref 11.5–14.9)
PLATELET # BLD: 161 K/UL (ref 150–450)
PMV BLD AUTO: 8.9 FL (ref 6–12)
POTASSIUM SERPL-SCNC: 5.3 MMOL/L (ref 3.7–5.3)
PROCALCITONIN: 23.02 NG/ML
RBC # BLD: 5.67 M/UL (ref 4.5–5.9)
SODIUM BLD-SCNC: 138 MMOL/L (ref 135–144)
SPECIMEN DESCRIPTION: NORMAL
SPECIMEN DESCRIPTION: NORMAL
TROPONIN INTERP: ABNORMAL
TROPONIN T: ABNORMAL NG/ML
TROPONIN, HIGH SENSITIVITY: 58 NG/L (ref 0–22)
WBC # BLD: 9.2 K/UL (ref 3.5–11)

## 2021-12-24 PROCEDURE — 82728 ASSAY OF FERRITIN: CPT

## 2021-12-24 PROCEDURE — 85520 HEPARIN ASSAY: CPT

## 2021-12-24 PROCEDURE — 2500000003 HC RX 250 WO HCPCS: Performed by: PHYSICIAN ASSISTANT

## 2021-12-24 PROCEDURE — 87449 NOS EACH ORGANISM AG IA: CPT

## 2021-12-24 PROCEDURE — 99223 1ST HOSP IP/OBS HIGH 75: CPT | Performed by: INTERNAL MEDICINE

## 2021-12-24 PROCEDURE — 36415 COLL VENOUS BLD VENIPUNCTURE: CPT

## 2021-12-24 PROCEDURE — 6370000000 HC RX 637 (ALT 250 FOR IP): Performed by: INTERNAL MEDICINE

## 2021-12-24 PROCEDURE — 6370000000 HC RX 637 (ALT 250 FOR IP): Performed by: PHYSICIAN ASSISTANT

## 2021-12-24 PROCEDURE — 85027 COMPLETE CBC AUTOMATED: CPT

## 2021-12-24 PROCEDURE — C9113 INJ PANTOPRAZOLE SODIUM, VIA: HCPCS | Performed by: PHYSICIAN ASSISTANT

## 2021-12-24 PROCEDURE — 2580000003 HC RX 258: Performed by: PHYSICIAN ASSISTANT

## 2021-12-24 PROCEDURE — 85379 FIBRIN DEGRADATION QUANT: CPT

## 2021-12-24 PROCEDURE — 94761 N-INVAS EAR/PLS OXIMETRY MLT: CPT

## 2021-12-24 PROCEDURE — 83615 LACTATE (LD) (LDH) ENZYME: CPT

## 2021-12-24 PROCEDURE — 2000000000 HC ICU R&B

## 2021-12-24 PROCEDURE — 84484 ASSAY OF TROPONIN QUANT: CPT

## 2021-12-24 PROCEDURE — 80048 BASIC METABOLIC PNL TOTAL CA: CPT

## 2021-12-24 PROCEDURE — 94640 AIRWAY INHALATION TREATMENT: CPT

## 2021-12-24 PROCEDURE — 2060000000 HC ICU INTERMEDIATE R&B

## 2021-12-24 PROCEDURE — 85014 HEMATOCRIT: CPT

## 2021-12-24 PROCEDURE — 84145 PROCALCITONIN (PCT): CPT

## 2021-12-24 PROCEDURE — 93306 TTE W/DOPPLER COMPLETE: CPT

## 2021-12-24 PROCEDURE — 6360000002 HC RX W HCPCS: Performed by: PHYSICIAN ASSISTANT

## 2021-12-24 PROCEDURE — 82947 ASSAY GLUCOSE BLOOD QUANT: CPT

## 2021-12-24 PROCEDURE — 85018 HEMOGLOBIN: CPT

## 2021-12-24 PROCEDURE — 86140 C-REACTIVE PROTEIN: CPT

## 2021-12-24 PROCEDURE — 2580000003 HC RX 258: Performed by: STUDENT IN AN ORGANIZED HEALTH CARE EDUCATION/TRAINING PROGRAM

## 2021-12-24 PROCEDURE — 93010 ELECTROCARDIOGRAM REPORT: CPT | Performed by: INTERNAL MEDICINE

## 2021-12-24 PROCEDURE — 87324 CLOSTRIDIUM AG IA: CPT

## 2021-12-24 RX ORDER — GABAPENTIN 300 MG/1
300 CAPSULE ORAL 2 TIMES DAILY
Status: DISCONTINUED | OUTPATIENT
Start: 2021-12-24 | End: 2021-12-27 | Stop reason: HOSPADM

## 2021-12-24 RX ORDER — ATORVASTATIN CALCIUM 20 MG/1
20 TABLET, FILM COATED ORAL DAILY
Refills: 1 | Status: DISCONTINUED | OUTPATIENT
Start: 2021-12-24 | End: 2021-12-27 | Stop reason: HOSPADM

## 2021-12-24 RX ORDER — PANTOPRAZOLE SODIUM 40 MG/10ML
40 INJECTION, POWDER, LYOPHILIZED, FOR SOLUTION INTRAVENOUS DAILY
Status: DISCONTINUED | OUTPATIENT
Start: 2021-12-24 | End: 2021-12-27 | Stop reason: HOSPADM

## 2021-12-24 RX ORDER — ACETAMINOPHEN 325 MG/1
650 TABLET ORAL EVERY 4 HOURS PRN
Status: DISCONTINUED | OUTPATIENT
Start: 2021-12-24 | End: 2021-12-27 | Stop reason: HOSPADM

## 2021-12-24 RX ORDER — ONDANSETRON 2 MG/ML
4 INJECTION INTRAMUSCULAR; INTRAVENOUS EVERY 6 HOURS PRN
Status: DISCONTINUED | OUTPATIENT
Start: 2021-12-24 | End: 2021-12-27 | Stop reason: HOSPADM

## 2021-12-24 RX ORDER — SODIUM CHLORIDE 9 MG/ML
10 INJECTION INTRAVENOUS DAILY
Status: DISCONTINUED | OUTPATIENT
Start: 2021-12-24 | End: 2021-12-27 | Stop reason: HOSPADM

## 2021-12-24 RX ORDER — BUDESONIDE AND FORMOTEROL FUMARATE DIHYDRATE 160; 4.5 UG/1; UG/1
2 AEROSOL RESPIRATORY (INHALATION) 2 TIMES DAILY
Refills: 3 | Status: DISCONTINUED | OUTPATIENT
Start: 2021-12-24 | End: 2021-12-27 | Stop reason: HOSPADM

## 2021-12-24 RX ADMIN — GABAPENTIN 300 MG: 300 CAPSULE ORAL at 21:22

## 2021-12-24 RX ADMIN — SODIUM CHLORIDE, PRESERVATIVE FREE 10 ML: 5 INJECTION INTRAVENOUS at 12:14

## 2021-12-24 RX ADMIN — ACETAMINOPHEN 650 MG: 325 TABLET, FILM COATED ORAL at 21:39

## 2021-12-24 RX ADMIN — SODIUM CHLORIDE: 9 INJECTION, SOLUTION INTRAVENOUS at 21:22

## 2021-12-24 RX ADMIN — BUDESONIDE AND FORMOTEROL FUMARATE DIHYDRATE 2 PUFF: 160; 4.5 AEROSOL RESPIRATORY (INHALATION) at 21:11

## 2021-12-24 RX ADMIN — PANTOPRAZOLE SODIUM 40 MG: 40 INJECTION, POWDER, FOR SOLUTION INTRAVENOUS at 12:14

## 2021-12-24 RX ADMIN — ATORVASTATIN CALCIUM 20 MG: 20 TABLET, FILM COATED ORAL at 12:14

## 2021-12-24 RX ADMIN — FAMOTIDINE 20 MG: 10 INJECTION, SOLUTION INTRAVENOUS at 09:06

## 2021-12-24 RX ADMIN — BUDESONIDE AND FORMOTEROL FUMARATE DIHYDRATE 2 PUFF: 160; 4.5 AEROSOL RESPIRATORY (INHALATION) at 11:13

## 2021-12-24 RX ADMIN — INSULIN LISPRO 1 UNITS: 100 INJECTION, SOLUTION INTRAVENOUS; SUBCUTANEOUS at 18:41

## 2021-12-24 RX ADMIN — INSULIN LISPRO 1 UNITS: 100 INJECTION, SOLUTION INTRAVENOUS; SUBCUTANEOUS at 21:26

## 2021-12-24 ASSESSMENT — PAIN SCALES - GENERAL
PAINLEVEL_OUTOF10: 0
PAINLEVEL_OUTOF10: 6
PAINLEVEL_OUTOF10: 0
PAINLEVEL_OUTOF10: 0

## 2021-12-24 NOTE — PROGRESS NOTES
Pt transported to room 2019, vitals taken, assessment completed. Hooked up to tele monitor, educated on use of call light and call light within reach. Dr. Blanco Schmid called due to diarrhea and increased abdominal pain, new orders for c-diff rule out. Specimen collected and sent.

## 2021-12-24 NOTE — CARE COORDINATION
Advance Care Planning     Advance Care Planning Activator (Inpatient)  Conversation Note      Date of ACP Conversation: 12/24/2021     Conversation Conducted with: Writer spoke to CARLOS Frye Wife, Sylvester El, Via Phone    ACP Activator: Milady Maurer RN      Health Care Decision Maker: Wife, Jessica Span [address]:     Care Preferences    Ventilation: \"If you were in your present state of health and suddenly became very ill and were unable to breathe on your own, what would your preference be about the use of a ventilator (breathing machine) if it were available to you? \"      Would the patient desire the use of ventilator (breathing machine)?: yes    \"If your health worsens and it becomes clear that your chance of recovery is unlikely, what would your preference be about the use of a ventilator (breathing machine) if it were available to you? \"     Would the patient desire the use of ventilator (breathing machine)?: Yes      Resuscitation  \"CPR works best to restart the heart when there is a sudden event, like a heart attack, in someone who is otherwise healthy. Unfortunately, CPR does not typically restart the heart for people who have serious health conditions or who are very sick. \"    \"In the event your heart stopped as a result of an underlying serious health condition, would you want attempts to be made to restart your heart (answer \"yes\" for attempt to resuscitate) or would you prefer a natural death (answer \"no\" for do not attempt to resuscitate)? \" yes       [] Yes   [] No   Educated Patient / Melissa Shah regarding differences between Advance Directives and portable DNR orders.     Length of ACP Conversation in minutes:      Conversation Outcomes:  [x] ACP discussion completed  [] Existing advance directive reviewed with patient; no changes to patient's previously recorded wishes  [] New Advance Directive completed  [] Portable Do Not Rescitate prepared for Provider review and signature  [] POLST/POST/MOLST/MOST prepared for Provider review and signature      Follow-up plan:    [] Schedule follow-up conversation to continue planning  [] Referred individual to Provider for additional questions/concerns   [] Advised patient/agent/surrogate to review completed ACP document and update if needed with changes in condition, patient preferences or care setting    [x] This note routed to one or more involved healthcare providers

## 2021-12-24 NOTE — PLAN OF CARE
Problem: Falls - Risk of:  Goal: Will remain free from falls  Description: Will remain free from falls  Outcome: Ongoing  Goal: Absence of physical injury  Description: Absence of physical injury  Outcome: Ongoing     Problem: Airway Clearance - Ineffective  Goal: Achieve or maintain patent airway  Outcome: Ongoing     Problem: Gas Exchange - Impaired  Goal: Absence of hypoxia  Outcome: Ongoing  Goal: Promote optimal lung function  Outcome: Ongoing     Problem: Breathing Pattern - Ineffective  Goal: Ability to achieve and maintain a regular respiratory rate  Outcome: Ongoing     Problem:  Body Temperature -  Risk of, Imbalanced  Goal: Ability to maintain a body temperature within defined limits  Outcome: Ongoing  Goal: Will regain or maintain usual level of consciousness  Outcome: Ongoing  Goal: Complications related to the disease process, condition or treatment will be avoided or minimized  Outcome: Ongoing     Problem: Isolation Precautions - Risk of Spread of Infection  Goal: Prevent transmission of infection  Outcome: Ongoing     Problem: Nutrition Deficits  Goal: Optimize nutritional status  Outcome: Ongoing     Problem: Risk for Fluid Volume Deficit  Goal: Maintain normal heart rhythm  Outcome: Ongoing  Goal: Maintain absence of muscle cramping  Outcome: Ongoing  Goal: Maintain normal serum potassium, sodium, calcium, phosphorus, and pH  Outcome: Ongoing     Problem: Loneliness or Risk for Loneliness  Goal: Demonstrate positive use of time alone when socialization is not possible  Outcome: Ongoing     Problem: Fatigue  Goal: Verbalize increase energy and improved vitality  Outcome: Ongoing     Problem: Patient Education: Go to Patient Education Activity  Goal: Patient/Family Education  Outcome: Ongoing     Problem: Pain:  Goal: Pain level will decrease  Description: Pain level will decrease  Outcome: Ongoing  Goal: Control of acute pain  Description: Control of acute pain  Outcome: Ongoing  Goal: Control of chronic pain  Description: Control of chronic pain  Outcome: Ongoing     Problem: Skin Integrity:  Goal: Will show no infection signs and symptoms  Description: Will show no infection signs and symptoms  Outcome: Ongoing  Goal: Absence of new skin breakdown  Description: Absence of new skin breakdown  Outcome: Ongoing  Note: No sign of skin breakdown at this time

## 2021-12-24 NOTE — CARE COORDINATION
CASE MANAGEMENT NOTE:    Admission Date:  12/23/2021 Cydney Rock is a 80 y.o.  male    Admitted for : Hyperkalemia [E87.5]  DANIELLE (acute kidney injury) ([facility] [address] [N17.9]    Met with:  Patient's Wife, Via Phone, Pt. Did not answer    PCP:                                  Insurance:  [facility] Medicare      Is patient alert and oriented at time of discussion:  Spoke to Wife    Current Residence/ Living Arrangements:  independently at home w/ Wife            Current Services PTA:  No    Does patient go to outpatient dialysis: No  If yes, location and chair time: NA    Is patient agreeable to VNS: No    Freedom of choice provided:  No    List of [address] provided: No    VNS chosen:  Wife Denies needs at this time. PT/OT on board, will follow    DME:  straight cane, walker and wheelchair    Home Oxygen: No    Nebulizer: Yes    CPAP/BIPAP: No    Supplier: N/A    Potential Assistance Needed: Yes, Follow for any Oxygen needs, Eliquis needs    SNF needed: PT/OT on board, will follow    [place] of choice and list provided: No    Pharmacy:  [facility]       Does Patient want to use MEDS to BEDS? No    Is patient currently receiving oral anticoagulation therapy? No    Is the Patient an GERA KUM[facility] with Readmission Risk Score greater than 14%? No  If yes, pt needs a follow up appointment made within 7 days. Family Members/Caregivers that pt would like involved in their care:    Yes    If yes, list name here:  Wife, Yinka Simons    Transportation Provider:  Patient             Discharge Plan:  12/24/21 [facility] Medicare Pt. COVID+, Per Wife, tested + at home & she did as well. ( Temp Max 101.0, 96% on RA)  Lives in 1 story home w/ Wife. DME - [facility]. Nebulizer, Denies VNS at this time, PT/OT on board, will follow for any rec/needs. K+ 7.2/5.3, Cardio/Nephro/GI, Echo, DDimer >20,000. Follow for Eliquis/Home Oxygen needs. Stephanie will need signed/completed, if needed//KB               Electronically signed by: Naman Siegel RN on 12/24/2021 at 2:04 PM

## 2021-12-24 NOTE — H&P
History and Physical Service  [facility]    HISTORY AND PHYSICAL EXAMINATION            Date:   12/24/2021  Patient name:  Mic Shepherd  MRN:   [id number]  Account:  [de-identified]  YOB: 1940  PCP:    Wei Limon MD  Code Status:    No Order    Chief Complaint:     Chief Complaint   Patient presents with    Fall         History Obtained From:     , EMR, nursing staff  His  HPI   tory Obtained From:  Past MediPast Medical History:asaf History: This patient is a 80 y.o. Non- / non  malewho presents with  Fall  Was found in bathroom by wife, alert but confused. Diagnosed with Covid 7 days ago  Noted to be hypoxic and tachycardic, febrile on arrival    Medical history includes asthma, hypertension, hyperlipidemia  Patient confused unable to give history    Review of Systems:     Denies any shortness of breath or cough  Denies chest pain or palpitations  Reports abdominla pain, diarrhea  Denies any new numbness tremors or weakness. A 10 point review of systems was performed and and negative except as mentioned in HPI  Positive and Negative as described in HPI. Past Medical History:     Past Medical History:   Diagnosis Date    Arthritis     Asthma     Hyperlipemia     Hypertension         Past Surgical History:     Past Surgical History:   Procedure Laterality Date    TOTAL KNEE ARTHROPLASTY          Medications Prior to Admission:     Prior to Admission medications    Medication Sig Start Date End Date Taking? Authorizing Provider   HYDROcodone-acetaminophen (NORCO)  MG per tablet Take 1 tablet by mouth every 6 hours as needed for Pain for up to 30 days. 12/17/21 1/16/22  Wei Limon MD   gabapentin (NEURONTIN) 300 MG capsule Take 1 capsule by mouth 2 times daily for 180 days.  Intended supply: 90 days 10/19/21 4/17/22  Wei Limon MD   lisinopril (PRINIVIL;ZESTRIL) 20 MG tablet Take 1 tablet by mouth daily 7/13/21   Latia Born Cata Warner MD   simvastatin (ZOCOR) 40 MG tablet Take 1 tablet by mouth nightly 7/13/21   Luigi Cosme MD   albuterol sulfate  (90 Base) MCG/ACT inhaler Inhale 2 puffs into the lungs 4 times daily 7/13/21 10/19/21  Luigi Cosme MD   fluticasone-salmeterol (ADVAIR) 250-50 MCG/DOSE AEPB Inhale 1 puff into the lungs 2 times daily 7/9/21   Luigi Cosme MD   albuterol (ACCUNEB) 1.25 MG/3ML nebulizer solution Inhale 1 ampule into the lungs every 6 hours as needed    Historical Provider, MD        Allergies:     Patient has no known allergies. Social History:     Tobacco:    reports that he quit smoking about 36 years ago. He has a 30.00 pack-year smoking history. He has never used smokeless tobacco.  Alcohol:      reports current alcohol use of about 1.0 standard drink of alcohol per week. Drug Use:  reports no history of drug use. Family History:     History reviewed. No pertinent family history. Physical Exam:   BP (!) 112/99   Pulse 105   Temp 98.4 °F (36.9 °C) (Oral)   Resp 24   Ht 5' 4\" (1.626 m)   Wt 173 lb 11.6 oz (78.8 kg)   SpO2 96%   BMI 29.82 kg/m²   No results for input(s): POCGLU in the last 72 hours. General Appearance:  alert,  and in no acute distress  Mental status: oriented only to self  Head:  normocephalic, bruise to forehead  Eye: no icterus, redness, pupils equal and reactive, extraocular eye movements intact, conjunctiva clear  Ear: normal external ear, no discharge, hearing intact  Nose:  no drainage noted  Mouth: mucous membranes moist  Neck: supple, no carotid bruits, thyroid not palpable  Lungs: Bilateral decreased air entry, no wheeze  Cardiovascular: normal rate, regular rhythm, no murmur, gallop, rub.   Abdomen: Soft, nontender, nondistended, normal bowel sounds, no hepatomegaly or splenomegaly  Neurologic: There are no new focal motor or sensory deficits, normal muscle tone and bulk, no abnormal sensation, normal speech, cranial nerves II through XII grossly intact  Skin: No gross lesions, rashes, bruising or bleeding on exposed skin area  Extremities:  peripheral pulses palpable, no pedal edema or calf pain with palpation  Psych: normal affect     Investigations:      Laboratory Testing:  Recent Results (from the past 24 hour(s))   BLOOD GAS, ARTERIAL    Collection Time: 12/23/21  4:38 PM   Result Value Ref Range    pH, Arterial 7.385 7.350 - 7.450    pCO2, Arterial 26.4 (LL) 35.0 - 45.0 mmHg    pO2, Arterial 71.1 (L) 80.0 - 100.0 mmHg    HCO3, Arterial 15.8 (L) 22.0 - 26.0 mmol/L    Positive Base Excess, Art NOT REPORTED 0.0 - 2.0 mmol/L    Negative Base Excess, Art 9.3 (H) 0.0 - 2.0 mmol/L    O2 Sat, Arterial 93.7 (L) 95 - 98 %    Total Hb NOT REPORTED 12.0 - 16.0 g/dl    Oxyhemoglobin NOT REPORTED 95.0 - 98.0 %    Carboxyhemoglobin 0.6 0 - 5 %    Methemoglobin 0.9 0.0 - 1.9 %    Pt Temp 37.0     pH, Art, Temp Adj NOT REPORTED 7.350 - 7.450    pCO2, Art, Temp Adj NOT REPORTED 35.0 - 45.0    pO2, Art, Temp Adj NOT REPORTED 80.0 - 100.0 mmHg    O2 Device/Flow/% ROOM AIR     Respiratory Rate 24     Kurtis Test PASS     Sample Site Right Radial Artery     Pt.  Position SEMI-FOWLERS     Mode NOT REPORTED     Set Rate NOT REPORTED     Total Rate NOT REPORTED     VT NOT REPORTED     FIO2 NOT REPORTED     Peep/Cpap NOT REPORTED     PSV NOT REPORTED     Text for Respiratory NOT REPORTED     NOTIFICATION NOT REPORTED     NOTIFICATION TIME NOT REPORTED    CBC Auto Differential    Collection Time: 12/23/21  5:05 PM   Result Value Ref Range    WBC 4.4 3.5 - 11.0 k/uL    RBC 5.51 4.5 - 5.9 m/uL    Hemoglobin 16.0 13.5 - 17.5 g/dL    Hematocrit 48.7 41 - 53 %    MCV 88.4 80 - 100 fL    MCH 29.0 26 - 34 pg    MCHC 32.8 31 - 37 g/dL    RDW 13.2 11.5 - 14.9 %    Platelets 472 236 - 590 k/uL    MPV 8.4 6.0 - 12.0 fL    NRBC Automated NOT REPORTED per 100 WBC    Differential Type NOT REPORTED     Seg Neutrophils 69 (H) 36 - 66 %    Lymphocytes 27 24 - 44 %    Monocytes 3 1 - 7 %    Eosinophils % 0 0 - 4 %    Basophils 1 0 - 2 %    Immature Granulocytes NOT REPORTED 0 %    Segs Absolute 3.00 1.3 - 9.1 k/uL    Absolute Lymph # 1.20 1.0 - 4.8 k/uL    Absolute Mono # 0.10 0.1 - 1.3 k/uL    Absolute Eos # 0.00 0.0 - 0.4 k/uL    Basophils Absolute 0.00 0.0 - 0.2 k/uL    Absolute Immature Granulocyte NOT REPORTED 0.00 - 0.30 k/uL    WBC Morphology NOT REPORTED     RBC Morphology NOT REPORTED     Platelet Estimate NOT REPORTED    Comprehensive Metabolic Panel w/ Reflex to MG    Collection Time: 12/23/21  5:05 PM   Result Value Ref Range    Glucose 213 (H) 70 - 99 mg/dL    BUN 65 (H) 8 - 23 mg/dL    CREATININE 2.08 (H) 0.70 - 1.20 mg/dL    Bun/Cre Ratio NOT REPORTED 9 - 20    Calcium 9.1 8.6 - 10.4 mg/dL    Sodium 129 (L) 135 - 144 mmol/L    Potassium 7.2 (HH) 3.7 - 5.3 mmol/L    Chloride 93 (L) 98 - 107 mmol/L    CO2 17 (L) 20 - 31 mmol/L    Anion Gap 19 (H) 9 - 17 mmol/L    Alkaline Phosphatase 78 40 - 129 U/L    ALT 47 (H) 5 - 41 U/L    AST 73 (H) <40 U/L    Total Bilirubin 0.25 (L) 0.3 - 1.2 mg/dL    Total Protein 7.6 6.4 - 8.3 g/dL    Albumin 4.3 3.5 - 5.2 g/dL    Albumin/Globulin Ratio NOT REPORTED 1.0 - 2.5    GFR Non- 31 (L) >60 mL/min    GFR  37 (L) >60 mL/min    GFR Comment          GFR Staging NOT REPORTED    Troponin    Collection Time: 12/23/21  5:05 PM   Result Value Ref Range    Troponin, High Sensitivity 50 (H) 0 - 22 ng/L    Troponin T NOT REPORTED <0.03 ng/mL    Troponin Interp NOT REPORTED    Protime-INR    Collection Time: 12/23/21  5:05 PM   Result Value Ref Range    Protime 13.4 11.8 - 14.6 sec    INR 1.0    APTT    Collection Time: 12/23/21  5:05 PM   Result Value Ref Range    PTT 31.3 24.0 - 36.0 sec   Lactate, Sepsis    Collection Time: 12/23/21  5:05 PM   Result Value Ref Range    Lactic Acid, Sepsis 4.2 (H) 0.5 - 1.9 mmol/L    Lactic Acid, Sepsis, Whole Blood NOT REPORTED 0.5 - 1.9 mmol/L   CK    Collection Time: 12/23/21  5:05 PM   Result Value Ref Range    Total CK 1,520 (H) 39 - 308 U/L   EKG 12 Lead    Collection Time: 12/23/21  5:11 PM   Result Value Ref Range    Ventricular Rate 124 BPM    Atrial Rate 124 BPM    P-R Interval 196 ms    QRS Duration 86 ms    Q-T Interval 296 ms    QTc Calculation (Bazett) 425 ms    P Axis 72 degrees    R Axis -8 degrees    T Axis 39 degrees   COVID-19 & Influenza Combo    Collection Time: 12/23/21  5:20 PM    Specimen: Nasopharyngeal Swab   Result Value Ref Range    Specimen Description . NASOPHARYNGEAL SWAB     Source . NASOPHARYNGEAL SWAB     SARS-CoV-2 RNA, RT PCR DETECTED (A) Not Detected    INFLUENZA A Not Detected Not Detected    INFLUENZA B Not Detected Not Detected   Culture, Blood 1    Collection Time: 12/23/21  5:42 PM    Specimen: Blood   Result Value Ref Range    Specimen Description . BLOOD  RIGHT WRIST, GRN 5ML, ORN 5ML     Special Requests NOT REPORTED     Culture NO GROWTH <24 HRS    Culture, Blood 1    Collection Time: 12/23/21  5:55 PM    Specimen: Blood   Result Value Ref Range    Specimen Description . BLOOD  LEFT HAND, GRN 5ML, NO ORN     Special Requests NOT REPORTED     Culture NO GROWTH <24 HRS    Urinalysis, reflex to microscopic    Collection Time: 12/23/21  6:49 PM   Result Value Ref Range    Color, UA Yellow Yellow    Turbidity UA Cloudy (A) Clear    Glucose, Ur NEGATIVE NEGATIVE    Bilirubin Urine NEGATIVE NEGATIVE    Ketones, Urine NEGATIVE NEGATIVE    Specific Gravity, UA 1.025 1.000 - 1.030    Urine Hgb LARGE (A) NEGATIVE    pH, UA 5.0 5.0 - 8.0    Protein, UA 1+ (A) NEGATIVE    Urobilinogen, Urine Normal Normal    Nitrite, Urine NEGATIVE NEGATIVE    Leukocyte Esterase, Urine NEGATIVE NEGATIVE    Urinalysis Comments NOT REPORTED    Microscopic Urinalysis    Collection Time: 12/23/21  6:49 PM   Result Value Ref Range    -          WBC, UA 0 TO 2 /HPF    RBC, UA 2 TO 5 /HPF    Casts UA NOT REPORTED /LPF    Crystals, UA NOT REPORTED None /HPF    Epithelial Cells UA 0 TO 2 /HPF    Renal Epithelial, UA NOT REPORTED 0 /HPF    Bacteria, UA MANY (A) None    Mucus, UA NOT REPORTED None    Trichomonas, UA NOT REPORTED None    Amorphous, UA 2+ (A) None    Other Observations UA NOT REPORTED NOT REQ. Yeast, UA NOT REPORTED None   Troponin    Collection Time: 12/23/21  8:55 PM   Result Value Ref Range    Troponin, High Sensitivity 115 (HH) 0 - 22 ng/L    Troponin T NOT REPORTED <0.03 ng/mL    Troponin Interp NOT REPORTED    Basic Metabolic Prof    Collection Time: 12/23/21  8:55 PM   Result Value Ref Range    Glucose 88 70 - 99 mg/dL    BUN 61 (H) 8 - 23 mg/dL    CREATININE 1.72 (H) 0.70 - 1.20 mg/dL    Bun/Cre Ratio NOT REPORTED 9 - 20    Calcium 8.6 8.6 - 10.4 mg/dL    Sodium 135 135 - 144 mmol/L    Potassium 4.8 3.7 - 5.3 mmol/L    Chloride 101 98 - 107 mmol/L    CO2 19 (L) 20 - 31 mmol/L    Anion Gap 15 9 - 17 mmol/L    GFR Non-African American 38 (L) >60 mL/min    GFR  46 (L) >60 mL/min    GFR Comment          GFR Staging NOT REPORTED    Lactic Acid    Collection Time: 12/23/21  8:55 PM   Result Value Ref Range    Lactic Acid 1.9 0.5 - 2.2 mmol/L   C DIFF TOXIN/ANTIGEN    Collection Time: 12/24/21 12:55 AM    Specimen: Stool   Result Value Ref Range    Specimen Description . FECES     C DIFF AG + TOXIN NEGATIVE NEGATIVE   HEPARIN LEVEL/ANTI-XA    Collection Time: 12/24/21  4:44 AM   Result Value Ref Range    Anti-XA Unfrac Heparin 0.85 (HH) 0.30 - 0.70 IU/L   Hgb/Hct    Collection Time: 12/24/21  7:00 AM   Result Value Ref Range    Hemoglobin 16.6 13.5 - 17.5 g/dL    Hematocrit 50.0 41 - 53 %       Recent Labs     12/24/21  0700 12/23/21 2055 12/23/21  1705 12/23/21  1705   HGB 16.6  --    < > 16.0   HCT 50.0  --    < > 48.7   WBC  --   --   --  4.4   MCV  --   --   --  88.4   NA  --  135   < > 129*   K  --  4.8   < > 7.2*   CL  --  101   < > 93*   CO2  --  19*   < > 17*   BUN  --  61*   < > 65*   CREATININE  --  1.72*   < > 2.08*   GLUCOSE  --  88   < > 213*   INR  --   --   --  1.0 PROTIME  --   --   --  13.4   APTT  --   --   --  31.3   AST  --   --   --  73*   ALT  --   --   --  47*   LABALBU  --   --   --  4.3    < > = values in this interval not displayed. Hematology:  Recent Labs     12/23/21 1705 12/24/21  0700   WBC 4.4  --    RBC 5.51  --    HGB 16.0 16.6   HCT 48.7 50.0   MCV 88.4  --    MCH 29.0  --    MCHC 32.8  --    RDW 13.2  --      --    MPV 8.4  --    INR 1.0  --      Chemistry:  Recent Labs     12/23/21 1705 12/23/21  2055   * 135   K 7.2* 4.8   CL 93* 101   CO2 17* 19*   GLUCOSE 213* 88   BUN 65* 61*   CREATININE 2.08* 1.72*   ANIONGAP 19* 15   LABGLOM 31* 38*   GFRAA 37* 46*   CALCIUM 9.1 8.6   TROPONINT NOT REPORTED NOT REPORTED   CKTOTAL 1,520*  --      Recent Labs     12/23/21  1705   PROT 7.6   LABALBU 4.3   AST 73*   ALT 47*   ALKPHOS 78   BILITOT 0.25*       Imaging/Diagnostics:       XR PELVIS (1-2 VIEWS)    Result Date: 12/23/2021  EXAMINATION: ONE XRAY VIEW OF THE PELVIS 12/23/2021 1:39 pm COMPARISON: None. HISTORY: ORDERING SYSTEM PROVIDED HISTORY: trauma, tachycardic, hypotensive TECHNOLOGIST PROVIDED HISTORY: trauma, tachycardic, hypotensive Reason for Exam: trauma, tachycardic, hypotensive FINDINGS: Bones appear demineralized. Ilioischial and iliopectineal lines are intact. SI joints and pubic symphysis are congruent. No acute soft tissue abnormalities are identified. No acute abnormality detected within the pelvis. CT Head WO Contrast    Result Date: 12/23/2021  EXAMINATION: CT OF THE HEAD WITHOUT CONTRAST  12/23/2021 4:47 pm TECHNIQUE: CT of the head was performed without the administration of intravenous contrast. Dose modulation, iterative reconstruction, and/or weight based adjustment of the mA/kV was utilized to reduce the radiation dose to as low as reasonably achievable. COMPARISON: None.  HISTORY: ORDERING SYSTEM PROVIDED HISTORY: trauma, fall, hypotensive, tachycardic, AMS TECHNOLOGIST PROVIDED HISTORY: trauma, fall, hypotensive, tachycardic, AMS Decision Support Exception - unselect if not a suspected or confirmed emergency medical condition->Emergency Medical Condition (MA) Reason for Exam: trauma, fall, hypotensive, tachycardic, AMS FINDINGS: CT OF THE BRAIN: BRAIN/VENTRICLES: No mass effect, edema or hemorrhage is seen. Mild-to-moderate volume loss is seen brain with mild microvascular ischemic changes. No hydrocephalus or extra-axial fluid is seen. The ORBITS: The visualized portion of the orbits demonstrate no acute abnormality. SINUSES: The visualized paranasal sinuses and mastoid air cells demonstrate no acute abnormality. SOFT TISSUES/SKULL: No acute abnormality of the visualized skull or soft tissues. CT CERVICAL SPINE: BONES/ALIGNMENT: There is no acute fracture or traumatic malalignment. Status post anterior cervical discectomy with fusion from C5-C7. DEGENERATIVE CHANGES: Minimal anterolisthesis of1 C7 over T1. Small disc bulges at C3-4 and C4-5 result in mild central canal stenoses. Multilevel neural foraminal stenoses, worst (moderate) at the left C3-4 level. SOFT TISSUES: There is no prevertebral soft tissue swelling. CT head without contrast: 1. No skull fracture or acute intracranial abnormality. CT cervical spine without contrast: 1. No fracture or joint dislocation is seen. 2. Degenerative changes, as described. RECOMMENDATIONS: Unavailable     CT Cervical Spine WO Contrast    Result Date: 12/23/2021  EXAMINATION: CT OF THE CERVICAL SPINE WITHOUT CONTRAST 12/23/2021 4:47 pm TECHNIQUE: CT of the cervical spine was performed without the administration of intravenous contrast. Multiplanar reformatted images are provided for review. Dose modulation, iterative reconstruction, and/or weight based adjustment of the mA/kV was utilized to reduce the radiation dose to as low as reasonably achievable. COMPARISON: None.  HISTORY: ORDERING SYSTEM PROVIDED HISTORY: trauma, fall, hypotensive, tachycardic, AMS TECHNOLOGIST PROVIDED HISTORY: trauma, fall, hypotensive, tachycardic, AMS Decision Support Exception - unselect if not a suspected or confirmed emergency medical condition->Emergency Medical Condition (MA) Reason for Exam: trauma, fall, hypotensive, tachycardic, AMS FINDINGS: BONES/ALIGNMENT: There is a minimal degenerative anterolisthesis of C7 on T1. Vertebral body alignment is otherwise normal.  Bones are diffusely demineralized. Cervical vertebral body heights are normal.  Facet joints are normally aligned. There is no acute fracture or traumatic malalignment. DEGENERATIVE CHANGES: Anterior fusion surgery from C5 through C7 with anterior plate and fixation screws at each level. There is good bone fusion of the disc spaces. There is mild degenerative disc disease and mild left-greater-than-right facet arthropathy at C3-C4 and C4-C5. SOFT TISSUES: There is no prevertebral soft tissue swelling. No acute fracture or traumatic malalignment of the cervical spine. Multilevel degenerative changes. Anterior fusion from C5 through C7. RECOMMENDATIONS: Unavailable     XR CHEST PORTABLE    Result Date: 12/23/2021  EXAMINATION: ONE XRAY VIEW OF THE CHEST 12/23/2021 4:39 pm COMPARISON: None. HISTORY: ORDERING SYSTEM PROVIDED HISTORY: trauma, hypoxic TECHNOLOGIST PROVIDED HISTORY: trauma, hypoxic Reason for Exam: Trauma, hypoxic FINDINGS: No acute airspace infiltrate. No pneumothorax or pleural effusion. Mild cardiomegaly. Atherosclerotic calcification in the aortic arch. Status post bilateral shoulder arthroplasty. No acute cardiopulmonary disease     CT CHEST ABDOMEN PELVIS W CONTRAST    Result Date: 12/23/2021  EXAMINATION: CT OF THE CHEST, ABDOMEN, AND PELVIS WITH CONTRAST 12/23/2021 1:48 pm TECHNIQUE: CT of the chest, abdomen and pelvis was performed with the administration of intravenous contrast. Multiplanar reformatted images are provided for review.  Dose modulation, iterative reconstruction, and/or weight based adjustment of the mA/kV was utilized to reduce the radiation dose to as low as reasonably achievable. COMPARISON: None HISTORY: ORDERING SYSTEM PROVIDED HISTORY: trauma, fall, hypotensive, tachycardic, AMS TECHNOLOGIST PROVIDED HISTORY: trauma, fall, hypotensive, tachycardic, AMS Decision Support Exception - unselect if not a suspected or confirmed emergency medical condition->Emergency Medical Condition (MA) Reason for Exam: trauma, fall, hypotensive, tachycardic, AMS FINDINGS: Chest: Mediastinum: Visualized thyroid is unremarkable. No pathologically enlarged mediastinal or hilar lymph nodes are seen. The esophagus is unremarkable. Thoracic aorta is normal in caliber, without evidence of dissection. Very limited imaging of the pulmonary arteries is unremarkable. No central pulmonary embolism is detected. Lungs/pleura: There is extensive respiratory motion artifact, limiting evaluation. That said, no acute focal infiltrate is identified. Atelectasis in the right middle lobe is noted. No pneumothorax is found. No pleural effusion is seen. Soft Tissues/Bones: Visualized extra thoracic soft tissues are unremarkable in appearance. Scattered small lucencies are identified within the thoracic spine. No acute bony abnormality detected within the chest. Abdomen/Pelvis: There is moderate respiratory motion artifact, limiting evaluation of the hollow and solid abdominal viscera. Organs: That said, no acute hepatic abnormality identified. Portal vein is patent. Gallbladder is unremarkable. The spleen, adrenals, and pancreas are unremarkable. No acute or suspicious renal abnormality identified. GI/Bowel: Stomach is unremarkable. A duodenal diverticulum is noted, without CT evidence of diverticulitis. Otherwise, the duodenal sweep and the remainder of the small bowel are unremarkable. Mild diverticulosis of the large bowel is present without CT evidence of diverticulitis.   Large bowel is otherwise unremarkable. The appendix is normal. Pelvis: Urinary bladder is decompressed. Mild to moderate prostatic hypertrophy. No free pelvic fluid. Peritoneum/Retroperitoneum: Moderate vascular calcifications within the abdominal aorta. The abdominal aorta is normal in caliber. Superior mesenteric artery is enhancing. Bones/Soft Tissues: Scattered small lucencies are identified within lumbar vertebral bodies as well as within the sacrum. No acute bony abnormality detected within the abdomen and pelvis. The extra-abdominal and extra pelvic soft tissues are unremarkable. No acute traumatic abnormality detected within the chest, abdomen, and pelvis. Mild diverticulosis of the large bowel is present without CT evidence of diverticulitis. Scattered small lucencies within the bony structures are seen diffusely.  These may reflect advanced osteoporosis, but blood dyscrasias (such as multiple myeloma) and metastatic disease remain in the differential.        Current Facility-Administered Medications   Medication Dose Route Frequency Provider Last Rate Last Admin    famotidine (PEPCID) injection 20 mg  20 mg IntraVENous Daily Shannan Elbert PA   20 mg at 12/24/21 0906    ondansetron (ZOFRAN) injection 4 mg  4 mg IntraVENous Q6H PRN Shannan Elbert PA        acetaminophen (TYLENOL) tablet 650 mg  650 mg Oral Q4H PRN Shannan Matrakira, PA        sodium chloride flush 0.9 % injection 10 mL  10 mL IntraVENous PRN Mireille F Jeremy, DO   10 mL at 12/23/21 1659    glucose (GLUTOSE) 40 % oral gel 15 g  15 g Oral PRN Mireille F Jeremy, DO        dextrose 50 % IV solution  12.5 g IntraVENous PRN Mireille F Jeremy, DO        glucagon (rDNA) injection 1 mg  1 mg IntraMUSCular PRN Lynn Dye, DO        dextrose 5 % solution  100 mL/hr IntraVENous PRN Lynn Dye, DO        0.9 % sodium chloride infusion   IntraVENous Continuous Lynn Dye,  mL/hr at 12/23/21 2209 New Bag at 12/23/21 2209    heparin (porcine) injection 4,000 Units  4,000 Units IntraVENous PRN Barbarann Langton, DO        heparin (porcine) injection 2,000 Units  2,000 Units IntraVENous PRN Barbarann Langton, DO        heparin (porcine) 25,000 Units in dextrose 5 % 250 mL infusion  11.79 Units/kg/hr IntraVENous Continuous Barbarann Juliana, DO   Held at 12/24/21 9240       Impressions :     1. Active Problems:    Essential hypertension    DANIELLE (acute kidney injury) ([facility] [address]  Resolved Problems:    * No resolved hospital problems. *        2.  has a past medical history of Arthritis, Asthma, Hyperlipemia, and Hypertension. Plans:     Sepsis secondary to COVID-19 pneumonia, suspected superimposed bacterial pneumonia-started on Decadron, Rocephin, blood cultures pending.     hypoxia-saturating 85% on presentation, improved on 2 L nasal cannula oxygen  acute metabolic encephalopathy likely secondary to hypoxia.   DANIELLE-creatinine 2.08 hyperventilation baseline 0.8, nephrology consulted, patient on saline infusion after sepsis bolus  Severe hyperkalemia 7.2 with no EKG changes, given calcium gluconate, insulin dextrose, nephrology consulted  Anion gap metabolic acidosis likely secondary to DANIELLE  Hyponatremia sodium 129  Elevated troponin-uptrending, no acute changes on EKG, started on heparin infusion, echo ordered, cardiology consulted  Elevated CK- will repeat  Fall with head injury-CT head unremarkable assessed by trauma surgery in ER  Hyperglycemia-sliding scale insulin, check HbA1c  Diarrhea-C. difficile and stool culture sent  Hematochezia-Heparin infusion held, H&H monitoring, GI consult  History of hypertension-home medications currently held  Hematuria-no clots, Grewal inserted, will monitor    Recent Labs     12/23/21  1705 12/24/21  0444 12/24/21  1059   CRP  --  49.9*  --    LDH  --  571*  --    LYMPHOPCT 27  --   --    DDIMER  --   --  >20.00*   PROCAL  --  23.02*  --            DVT pplx-Heparin infusion, held due to

## 2021-12-24 NOTE — CONSULTS
General Surgery Consult      Pt Name: Earl Gamboa  MRN: [id number]  YOB: 1940  Date of evaluation: 12/24/2021  Primary Care Physician: Kolby Rosas MD   Patient evaluated at the request of  Dr. Damairs Gama  Reason for evaluation: Fall (seen in the emergency department yesterday for trauma alert.)    SUBJECTIVE:   History of Chief Complaint:    Earl Gamboa is a 80 y.o. male who presents with fall at home. Patient was reportedly found face down in bathroom at home, possibly had fallen off of the toilet. He was confused. Transported to ED via EMS with C-collar in place. He was hypoxic, tachycardic, tachypneic in ED. Due to AMS, vital signs, and unknown medical history trauma alert was called. Labs in ED revealed hyperkalemia, elevated troponin and creatinine. Patient known COVID-19 positive 7 days prior; rapid in ED still positive. He had ecchymosis to right frontal forehead but no lacerations. CT head and C-spine negative for acute abnormalities. C-spine cleared in ED. CXR and XR pelvis negative. CT chest/abdomen/pelvis negative for acute traumatic abnormalities as well. Patient is awake and alert today in COVID unit. Oriented x 3. He does not remember falling. Patient did have fever Tmax 101F overnight. He denies H/A, neck pain, back pain, SOB, chest pain, N/V/D, abdominal pain, distal extremity numbness/tingling. Patient was started on heparin gtt due to elevated troponin however it is currently on hold as patient had a small bloody BM. Symptom onset has been acute for a time period of <1 day(s). Severity is described as mild-moderate. Course of his symptoms over time is acute. Past Medical History   has a past medical history of Arthritis, Asthma, Hyperlipemia, and Hypertension. Past Surgical History   has a past surgical history that includes Total knee arthroplasty. Medications  Prior to Admission medications    Medication Sig Start Date End Date Taking?  Authorizing Provider HYDROcodone-acetaminophen (NORCO)  MG per tablet Take 1 tablet by mouth every 6 hours as needed for Pain for up to 30 days. 12/17/21 1/16/22  Rebecca Grace MD   gabapentin (NEURONTIN) 300 MG capsule Take 1 capsule by mouth 2 times daily for 180 days. Intended supply: 90 days 10/19/21 4/17/22  Rebecca Grace MD   lisinopril (PRINIVIL;ZESTRIL) 20 MG tablet Take 1 tablet by mouth daily 7/13/21   Rebecca Grace MD   simvastatin (ZOCOR) 40 MG tablet Take 1 tablet by mouth nightly 7/13/21   Rebecca Grace MD   albuterol sulfate  (90 Base) MCG/ACT inhaler Inhale 2 puffs into the lungs 4 times daily 7/13/21 10/19/21  Rebecca Grace MD   fluticasone-salmeterol (ADVAIR) 250-50 MCG/DOSE AEPB Inhale 1 puff into the lungs 2 times daily 7/9/21   Rebecca Grace MD   albuterol (ACCUNEB) 1.25 MG/3ML nebulizer solution Inhale 1 ampule into the lungs every 6 hours as needed    Historical Provider, MD     Allergies  has No Known Allergies. Family History  family history is not on file. Social History   reports that he quit smoking about 36 years ago. He has a 30.00 pack-year smoking history. He has never used smokeless tobacco. He reports current alcohol use of about 1.0 standard drink of alcohol per week. He reports that he does not use drugs. Review of Systems:  General Denies any fever or chills  HEENT Denies any diplopia, tinnitus or vertigo  Resp Denies any shortness of breath, cough or wheezing  Cardiac Denies any chest pain, palpitations, claudication or edema  GI Denies any melena, hematochezia, hematemesis or pyrosis however patient reportedly had a bloody BM while on heparin gtt   Denies any frequency, urgency, hesitancy or incontinence  Heme Denies bruising or bleeding easily  Endocrine Denies any history of diabetes or thyroid disease  Neuro Denies any focal motor or sensory deficits    OBJECTIVE:   VITALS:  height is 5' 4\" (1.626 m) and weight is 173 lb 11.6 oz (78.8 kg).  His oral temperature is 98.4 °F (36.9 °C). His blood pressure is 112/99 (abnormal) and his pulse is 105. His respiration is 24 and oxygen saturation is 96%. CONSTITUTIONAL: Alert and oriented times 3, no acute distress and cooperative to examination with proper mood and affect. SKIN: Right temporal and frontal scalp ecchymoses however no laceration. No other noted abrasions/ecchymosis. LYMPH: no cervical nodes, no inguinal nodes  HEENT: Head is normocephalic, atraumatic. EOMI, PERRLA  NECK: Supple, symmetrical, trachea midline, no adenopathy, thyroid symmetric, not enlarged and no tenderness, skin normal  CHEST/LUNGS: chest symmetric with normal A/P diameter, normal respiratory rate and rhythm, lungs clear to auscultation without wheezes, rales or rhonchi. No accessory muscle use. CARDIOVASCULAR: Heart regular rate and rhythm Normal S1 and S2. . Carotid and femoral pulses 2+/4 and equal bilaterally  ABDOMEN: Normal shape. Normal bowel sounds. No bruits. Soft, nondistended, no masses or organomegaly. no evidence of hernia. Percussion: Normal without hepatosplenomegally. Tenderness: absent  RECTAL: deferred, not clinically indicated  NEUROLOGIC: There are no focalizing motor or sensory deficits. CN II-XII are grossly intact.   EXTREMITIES: no cyanosis, no clubbing and no edema    LABS:   CBC with Differential:    Lab Results   Component Value Date    WBC 9.2 12/24/2021    RBC 5.67 12/24/2021    HGB 16.6 12/24/2021    HGB 16.6 12/24/2021    HCT 50.0 12/24/2021    HCT 50.0 12/24/2021     12/24/2021    MCV 88.6 12/24/2021    MCH 29.3 12/24/2021    MCHC 33.1 12/24/2021    RDW 13.6 12/24/2021    LYMPHOPCT 27 12/23/2021    MONOPCT 3 12/23/2021    BASOPCT 1 12/23/2021    MONOSABS 0.10 12/23/2021    LYMPHSABS 1.20 12/23/2021    EOSABS 0.00 12/23/2021    BASOSABS 0.00 12/23/2021    DIFFTYPE NOT REPORTED 12/23/2021     BMP:    Lab Results   Component Value Date     12/23/2021    K 4.8 12/23/2021     12/23/2021    CO2 19 12/23/2021    BUN 61 12/23/2021    LABALBU 4.3 12/23/2021    CREATININE 1.72 12/23/2021    CALCIUM 8.6 12/23/2021    GFRAA 46 12/23/2021    LABGLOM 38 12/23/2021    GLUCOSE 88 12/23/2021     Hepatic Function Panel:    Lab Results   Component Value Date    ALKPHOS 78 12/23/2021    ALT 47 12/23/2021    AST 73 12/23/2021    PROT 7.6 12/23/2021    BILITOT 0.25 12/23/2021    LABALBU 4.3 12/23/2021     Calcium:    Lab Results   Component Value Date    CALCIUM 8.6 12/23/2021     Magnesium:  No results found for: MG  Phosphorus:  No results found for: PHOS  PT/INR:    Lab Results   Component Value Date    PROTIME 13.4 12/23/2021    INR 1.0 12/23/2021     ABG:    Lab Results   Component Value Date    PHART 7.385 12/23/2021    EOM7PDB 26.4 12/23/2021    PO2ART 71.1 12/23/2021    BGM5VCH 15.8 12/23/2021    W9FQFPLB 93.7 12/23/2021     Urine Culture:  No components found for: CURINE  Blood Culture:  No components found for: CBLOOD, CFUNGUSBL  Stool Culture:  No components found for: CSTOOL    RADIOLOGY:   I have personally reviewed the following films:  XR PELVIS (1-2 VIEWS)    Result Date: 12/23/2021  EXAMINATION: ONE XRAY VIEW OF THE PELVIS 12/23/2021 1:39 pm COMPARISON: None. HISTORY: ORDERING SYSTEM PROVIDED HISTORY: trauma, tachycardic, hypotensive TECHNOLOGIST PROVIDED HISTORY: trauma, tachycardic, hypotensive Reason for Exam: trauma, tachycardic, hypotensive FINDINGS: Bones appear demineralized. Ilioischial and iliopectineal lines are intact. SI joints and pubic symphysis are congruent. No acute soft tissue abnormalities are identified. No acute abnormality detected within the pelvis.      CT Head WO Contrast    Result Date: 12/23/2021  EXAMINATION: CT OF THE HEAD WITHOUT CONTRAST  12/23/2021 4:47 pm TECHNIQUE: CT of the head was performed without the administration of intravenous contrast. Dose modulation, iterative reconstruction, and/or weight based adjustment of the mA/kV was utilized to reduce the radiation dose to as low as reasonably achievable. COMPARISON: None. HISTORY: ORDERING SYSTEM PROVIDED HISTORY: trauma, fall, hypotensive, tachycardic, AMS TECHNOLOGIST PROVIDED HISTORY: trauma, fall, hypotensive, tachycardic, AMS Decision Support Exception - unselect if not a suspected or confirmed emergency medical condition->Emergency Medical Condition (MA) Reason for Exam: trauma, fall, hypotensive, tachycardic, AMS FINDINGS: CT OF THE BRAIN: BRAIN/VENTRICLES: No mass effect, edema or hemorrhage is seen. Mild-to-moderate volume loss is seen brain with mild microvascular ischemic changes. No hydrocephalus or extra-axial fluid is seen. The ORBITS: The visualized portion of the orbits demonstrate no acute abnormality. SINUSES: The visualized paranasal sinuses and mastoid air cells demonstrate no acute abnormality. SOFT TISSUES/SKULL: No acute abnormality of the visualized skull or soft tissues. CT CERVICAL SPINE: BONES/ALIGNMENT: There is no acute fracture or traumatic malalignment. Status post anterior cervical discectomy with fusion from C5-C7. DEGENERATIVE CHANGES: Minimal anterolisthesis of1 C7 over T1. Small disc bulges at C3-4 and C4-5 result in mild central canal stenoses. Multilevel neural foraminal stenoses, worst (moderate) at the left C3-4 level. SOFT TISSUES: There is no prevertebral soft tissue swelling. CT head without contrast: 1. No skull fracture or acute intracranial abnormality. CT cervical spine without contrast: 1. No fracture or joint dislocation is seen. 2. Degenerative changes, as described. RECOMMENDATIONS: Unavailable     CT Cervical Spine WO Contrast    Result Date: 12/23/2021  EXAMINATION: CT OF THE CERVICAL SPINE WITHOUT CONTRAST 12/23/2021 4:47 pm TECHNIQUE: CT of the cervical spine was performed without the administration of intravenous contrast. Multiplanar reformatted images are provided for review.  Dose modulation, iterative reconstruction, and/or weight based adjustment of the mA/kV was utilized to reduce the radiation dose to as low as reasonably achievable. COMPARISON: None. HISTORY: ORDERING SYSTEM PROVIDED HISTORY: trauma, fall, hypotensive, tachycardic, AMS TECHNOLOGIST PROVIDED HISTORY: trauma, fall, hypotensive, tachycardic, AMS Decision Support Exception - unselect if not a suspected or confirmed emergency medical condition->Emergency Medical Condition (MA) Reason for Exam: trauma, fall, hypotensive, tachycardic, AMS FINDINGS: BONES/ALIGNMENT: There is a minimal degenerative anterolisthesis of C7 on T1. Vertebral body alignment is otherwise normal.  Bones are diffusely demineralized. Cervical vertebral body heights are normal.  Facet joints are normally aligned. There is no acute fracture or traumatic malalignment. DEGENERATIVE CHANGES: Anterior fusion surgery from C5 through C7 with anterior plate and fixation screws at each level. There is good bone fusion of the disc spaces. There is mild degenerative disc disease and mild left-greater-than-right facet arthropathy at C3-C4 and C4-C5. SOFT TISSUES: There is no prevertebral soft tissue swelling. No acute fracture or traumatic malalignment of the cervical spine. Multilevel degenerative changes. Anterior fusion from C5 through C7. RECOMMENDATIONS: Unavailable     XR CHEST PORTABLE    Result Date: 12/23/2021  EXAMINATION: ONE XRAY VIEW OF THE CHEST 12/23/2021 4:39 pm COMPARISON: None. HISTORY: ORDERING SYSTEM PROVIDED HISTORY: trauma, hypoxic TECHNOLOGIST PROVIDED HISTORY: trauma, hypoxic Reason for Exam: Trauma, hypoxic FINDINGS: No acute airspace infiltrate. No pneumothorax or pleural effusion. Mild cardiomegaly. Atherosclerotic calcification in the aortic arch. Status post bilateral shoulder arthroplasty.      No acute cardiopulmonary disease     CT CHEST ABDOMEN PELVIS W CONTRAST    Result Date: 12/23/2021  EXAMINATION: CT OF THE CHEST, ABDOMEN, AND PELVIS WITH CONTRAST 12/23/2021 1:48 pm TECHNIQUE: Otherwise, the duodenal sweep and the remainder of the small bowel are unremarkable. Mild diverticulosis of the large bowel is present without CT evidence of diverticulitis. Large bowel is otherwise unremarkable. The appendix is normal. Pelvis: Urinary bladder is decompressed. Mild to moderate prostatic hypertrophy. No free pelvic fluid. Peritoneum/Retroperitoneum: Moderate vascular calcifications within the abdominal aorta. The abdominal aorta is normal in caliber. Superior mesenteric artery is enhancing. Bones/Soft Tissues: Scattered small lucencies are identified within lumbar vertebral bodies as well as within the sacrum. No acute bony abnormality detected within the abdomen and pelvis. The extra-abdominal and extra pelvic soft tissues are unremarkable. No acute traumatic abnormality detected within the chest, abdomen, and pelvis. Mild diverticulosis of the large bowel is present without CT evidence of diverticulitis. Scattered small lucencies within the bony structures are seen diffusely. These may reflect advanced osteoporosis, but blood dyscrasias (such as multiple myeloma) and metastatic disease remain in the differential.         IMPRESSION:   Fall at home, unwitnessed  AMS, improved   Hyperkalemia, elevated creatinine. Hyperkalemia resolved. Creatinine is improving. CBC is unremarkable. Elevated troponin; heparin gtt on hold due to bloody BM  COVID-19 positive     does not have any pertinent problems on file. PLAN:   Diet as tolerated  Daily CBC, BMP  GI prophylaxis, monitor bowel movements   Surgically stable  Cardiology, nephrology, and GI consultations  Continue medical management   No acute general surgery trauma issues at this time. Continue medical management. Thank you for this interesting consult and for allowing us to participate in the care of this patient. If you have any questions please don't hesitate to call.         Electronically signed by LAURENCE Venegas  on 12/24/2021 at 10:22 AM

## 2021-12-24 NOTE — CONSULTS
Came into the emergency room to evaluate this patient. Trauma alert was called at 4:23 PM.  I was in the emergency department at 4:29 PM.  Patient was evaluated along with the emergency room physician. History of fall. COVID-19 positive few days ago. Patient was examined along with the ER physician and the resident. Await blood work and imaging studies. Depending on the blood work and imaging studies I will make further recommendations. Patient is responding to resuscitative measures. Late entry. Formal consult to follow.

## 2021-12-25 LAB
ANION GAP SERPL CALCULATED.3IONS-SCNC: 12 MMOL/L (ref 9–17)
ANTI-XA UNFRAC HEPARIN: <0.1 IU/L (ref 0.3–0.7)
BUN BLDV-MCNC: 54 MG/DL (ref 8–23)
BUN/CREAT BLD: ABNORMAL (ref 9–20)
CALCIUM SERPL-MCNC: 7.9 MG/DL (ref 8.6–10.4)
CHLORIDE BLD-SCNC: 109 MMOL/L (ref 98–107)
CO2: 15 MMOL/L (ref 20–31)
CREAT SERPL-MCNC: 1.28 MG/DL (ref 0.7–1.2)
GFR AFRICAN AMERICAN: >60 ML/MIN
GFR NON-AFRICAN AMERICAN: 54 ML/MIN
GFR SERPL CREATININE-BSD FRML MDRD: ABNORMAL ML/MIN/{1.73_M2}
GFR SERPL CREATININE-BSD FRML MDRD: ABNORMAL ML/MIN/{1.73_M2}
GLUCOSE BLD-MCNC: 133 MG/DL (ref 75–110)
GLUCOSE BLD-MCNC: 138 MG/DL (ref 70–99)
GLUCOSE BLD-MCNC: 142 MG/DL (ref 75–110)
GLUCOSE BLD-MCNC: 149 MG/DL (ref 75–110)
GLUCOSE BLD-MCNC: 170 MG/DL (ref 75–110)
HCT VFR BLD CALC: 49.4 % (ref 41–53)
HEMOGLOBIN: 16.1 G/DL (ref 13.5–17.5)
MCH RBC QN AUTO: 28.9 PG (ref 26–34)
MCHC RBC AUTO-ENTMCNC: 32.6 G/DL (ref 31–37)
MCV RBC AUTO: 88.6 FL (ref 80–100)
NRBC AUTOMATED: ABNORMAL PER 100 WBC
PDW BLD-RTO: 14 % (ref 11.5–14.9)
PLATELET # BLD: 172 K/UL (ref 150–450)
PMV BLD AUTO: 8.5 FL (ref 6–12)
POTASSIUM SERPL-SCNC: 4.7 MMOL/L (ref 3.7–5.3)
RBC # BLD: 5.58 M/UL (ref 4.5–5.9)
SODIUM BLD-SCNC: 136 MMOL/L (ref 135–144)
TOTAL CK: 6297 U/L (ref 39–308)
WBC # BLD: 18.7 K/UL (ref 3.5–11)

## 2021-12-25 PROCEDURE — 80048 BASIC METABOLIC PNL TOTAL CA: CPT

## 2021-12-25 PROCEDURE — 6370000000 HC RX 637 (ALT 250 FOR IP): Performed by: INTERNAL MEDICINE

## 2021-12-25 PROCEDURE — 85520 HEPARIN ASSAY: CPT

## 2021-12-25 PROCEDURE — 2580000003 HC RX 258: Performed by: INTERNAL MEDICINE

## 2021-12-25 PROCEDURE — 99232 SBSQ HOSP IP/OBS MODERATE 35: CPT | Performed by: INTERNAL MEDICINE

## 2021-12-25 PROCEDURE — 36415 COLL VENOUS BLD VENIPUNCTURE: CPT

## 2021-12-25 PROCEDURE — 2580000003 HC RX 258: Performed by: STUDENT IN AN ORGANIZED HEALTH CARE EDUCATION/TRAINING PROGRAM

## 2021-12-25 PROCEDURE — 6360000002 HC RX W HCPCS: Performed by: PHYSICIAN ASSISTANT

## 2021-12-25 PROCEDURE — 6360000002 HC RX W HCPCS: Performed by: INTERNAL MEDICINE

## 2021-12-25 PROCEDURE — C9113 INJ PANTOPRAZOLE SODIUM, VIA: HCPCS | Performed by: PHYSICIAN ASSISTANT

## 2021-12-25 PROCEDURE — 82947 ASSAY GLUCOSE BLOOD QUANT: CPT

## 2021-12-25 PROCEDURE — 2580000003 HC RX 258: Performed by: PHYSICIAN ASSISTANT

## 2021-12-25 PROCEDURE — 82550 ASSAY OF CK (CPK): CPT

## 2021-12-25 PROCEDURE — 85027 COMPLETE CBC AUTOMATED: CPT

## 2021-12-25 PROCEDURE — 87086 URINE CULTURE/COLONY COUNT: CPT

## 2021-12-25 PROCEDURE — 2060000000 HC ICU INTERMEDIATE R&B

## 2021-12-25 RX ORDER — DEXAMETHASONE SODIUM PHOSPHATE 4 MG/ML
6 INJECTION, SOLUTION INTRA-ARTICULAR; INTRALESIONAL; INTRAMUSCULAR; INTRAVENOUS; SOFT TISSUE DAILY
Status: DISCONTINUED | OUTPATIENT
Start: 2021-12-25 | End: 2021-12-27 | Stop reason: HOSPADM

## 2021-12-25 RX ADMIN — INSULIN LISPRO 1 UNITS: 100 INJECTION, SOLUTION INTRAVENOUS; SUBCUTANEOUS at 23:02

## 2021-12-25 RX ADMIN — CEFTRIAXONE SODIUM 1000 MG: 1 INJECTION, POWDER, FOR SOLUTION INTRAMUSCULAR; INTRAVENOUS at 17:24

## 2021-12-25 RX ADMIN — INSULIN LISPRO 1 UNITS: 100 INJECTION, SOLUTION INTRAVENOUS; SUBCUTANEOUS at 12:54

## 2021-12-25 RX ADMIN — GABAPENTIN 300 MG: 300 CAPSULE ORAL at 23:01

## 2021-12-25 RX ADMIN — PANTOPRAZOLE SODIUM 40 MG: 40 INJECTION, POWDER, FOR SOLUTION INTRAVENOUS at 09:55

## 2021-12-25 RX ADMIN — ATORVASTATIN CALCIUM 20 MG: 20 TABLET, FILM COATED ORAL at 09:55

## 2021-12-25 RX ADMIN — DEXAMETHASONE SODIUM PHOSPHATE 6 MG: 4 INJECTION, SOLUTION INTRA-ARTICULAR; INTRALESIONAL; INTRAMUSCULAR; INTRAVENOUS; SOFT TISSUE at 12:54

## 2021-12-25 RX ADMIN — SODIUM CHLORIDE: 9 INJECTION, SOLUTION INTRAVENOUS at 20:08

## 2021-12-25 RX ADMIN — GABAPENTIN 300 MG: 300 CAPSULE ORAL at 09:55

## 2021-12-25 RX ADMIN — SODIUM CHLORIDE, PRESERVATIVE FREE 10 ML: 5 INJECTION INTRAVENOUS at 09:55

## 2021-12-25 RX ADMIN — INSULIN LISPRO 1 UNITS: 100 INJECTION, SOLUTION INTRAVENOUS; SUBCUTANEOUS at 10:58

## 2021-12-25 NOTE — PLAN OF CARE
Problem: Falls - Risk of:  Goal: Will remain free from falls  Description: Will remain free from falls  Outcome: Ongoing     Problem: Falls - Risk of:  Goal: Absence of physical injury  Description: Absence of physical injury  Outcome: Ongoing     Problem: Airway Clearance - Ineffective  Goal: Achieve or maintain patent airway  Outcome: Ongoing     Problem: Gas Exchange - Impaired  Goal: Absence of hypoxia  Outcome: Ongoing     Problem: Gas Exchange - Impaired  Goal: Promote optimal lung function  Outcome: Ongoing

## 2021-12-25 NOTE — PROGRESS NOTES
Discussed with nursing staff. No issues from my standpoint. Tolerating diet. Denied any abdominal pain. WBC count is elevated. Pulmonary medicine on the case. Continue medical management. No acute general surgical issues at this time based on my discussion with the nurse. Patient was not examined given his COVID-19 status. He was examined yesterday.

## 2021-12-25 NOTE — PROGRESS NOTES
[facility] Internal Medicine    Progress Note    12/25/2021    2:46 PM    Name:   Debi Mathis  MRN:     [id number]     [facility]:      [de-identified]   Room:   [address] Day:  2  Admit Date:  12/23/2021  4:24 PM    PCP:   Baljinder Covington MD  Code Status:  No Order    Subjective:     C/C:   Chief Complaint   Patient presents with   Ronalee Martin         Interval History Status: Improving    HPI:     See H&P    Review of Systems:     Positive for shortness of breath, no cough  Denies chest pain or palpitations  Denies abdominal pain, diarrhea vomiting  Denies any new numbness tremors or weakness. Medications: Allergies:  No Known Allergies    Current Meds:   Scheduled Meds:    dexamethasone  6 mg IntraVENous Daily    insulin lispro  0-6 Units SubCUTAneous TID WC    insulin lispro  0-3 Units SubCUTAneous Nightly    budesonide-formoterol  2 puff Inhalation BID    gabapentin  300 mg Oral BID    atorvastatin  20 mg Oral Daily    pantoprazole  40 mg IntraVENous Daily    And    sodium chloride (PF)  10 mL IntraVENous Daily     Continuous Infusions:    dextrose      sodium chloride 100 mL/hr at 12/24/21 2122    [Held by provider] heparin (PORCINE) Infusion Stopped (12/24/21 0645)     PRN Meds: ondansetron, acetaminophen, sodium chloride flush, glucose, dextrose, glucagon (rDNA), dextrose, heparin (porcine), heparin (porcine)    Data:     Past Medical History:   has a past medical history of Arthritis, Asthma, Hyperlipemia, and Hypertension. Social History:   reports that he quit smoking about 36 years ago. He has a 30.00 pack-year smoking history. He has never used smokeless tobacco. He reports current alcohol use of about 1.0 standard drink of alcohol per week. He reports that he does not use drugs. Family History: History reviewed. No pertinent family history.     Vitals:  BP (!) 141/85   Pulse 84   Temp 97.6 °F (36.4 °C) (Oral)   Resp 18   Ht 5' 4\" (1.626 m) Wt 172 lb 10.3 oz (78.3 kg)   SpO2 93%   BMI 29.63 kg/m²   Temp (24hrs), Av.6 °F (36.4 °C), Min:97.3 °F (36.3 °C), Max:97.8 °F (36.6 °C)    Recent Labs     21  1124 21  1634 21  2125   POCGLU 144* 166* 162*       I/O (24Hr): Intake/Output Summary (Last 24 hours) at 2021 1446  Last data filed at 2021 1055  Gross per 24 hour   Intake --   Output 1100 ml   Net -1100 ml       Labs:    Lab Results   Component Value Date    WBC 18.7 (H) 2021    HGB 16.1 2021    HCT 49.4 2021    MCV 88.6 2021     2021     Lab Results   Component Value Date     2021    K 4.7 2021     2021    CO2 15 2021    BUN 54 2021    CREATININE 1.28 2021    GLUCOSE 138 2021    CALCIUM 7.9 2021          Lab Results   Component Value Date/Time    SPECIAL NOT REPORTED 2021 06:49 PM     Lab Results   Component Value Date/Time    CULTURE NO GROWTH 2021 06:49 PM         Radiology:    Recent data reviewed    Physical Examination:        General appearance:  alert, cooperative and no distress  Eyes: Anicteric sclera. Pupils are equally round and reactive to light. Extraocular movements are intact.   Lungs:  clear to auscultation bilaterally, normal effort  Heart:  regular rate and rhythm, no murmur  Abdomen:  Soft,generalized abdominal tender, nondistended, normal bowel sounds, no masses, hepatomegaly, splenomegaly  Extremities:  no edema, redness, tenderness in the calves  Skin:  no gross lesions, rashes, induration  Neuro:  Alert, , no new focal weakness  Assessment:        Primary Problem  <principal problem not specified>    Active Hospital Problems    Diagnosis Date Noted    DANIELLE (acute kidney injury) ([facility] [address] [N17.9] 2021    Essential hypertension [I10] 06/15/2021           DVT prophylaxis: Heparin infusion  Antibiotics:    Plan:        Sepsis secondary to COVID-19 pneumonia, suspected superimposed bacterial pneumonia-started on Decadron, Rocephin,  no growth on blood cultures   hypoxia-saturating 85% on presentation, improved on 2 L nasal cannula oxygen  acute metabolic encephalopathy likely secondary to hypoxia.   DANIELLE-creatinine 2.08 hyperventilation baseline 0.8, nephrology consulted, patient on saline infusion after sepsis bolus  Severe hyperkalemia 7.2 with no EKG changes, given calcium gluconate, insulin dextrose, nephrology consulted  Anion gap metabolic acidosis likely secondary to DANIELLE  Hyponatremia sodium 129  Elevated troponin-uptrending, no acute changes on EKG, started on heparin infusion, echo ordered, cardiology consulted  Elevated CK- will repeat  Fall with head injury-CT head unremarkable assessed by trauma surgery in ER  Hyperglycemia-sliding scale insulin, check HbA1c  Diarrhea-C. difficile -negative  Hematochezia-Heparin infusion held, H&H monitoring, GI consult  History of hypertension-home medications currently held  Hematuria-no clots, Grewal inserted, will monitor     12/25  Appears less confused tday  No growth on blood cultures, C. difficile negative  Diarrhea has stopped no further bloody BM  Saturating well on room air  Creatinine improved to 1.28, potassium normal, sodium normal  Still mild metabolic acidosis bicarb of 15  Hemoglobin stable no further rectal bleed- will get renal US, start rocephin  Echo awaited    Code status- full        Carolynn Mera MD  12/25/2021  2:46 PM

## 2021-12-25 NOTE — CARE COORDINATION
ONGOING DISCHARGE PLAN:    COVID POSITIVE. Afebrile, on RA. Previous d/c planner spoke with patient over the phone. Plan is for patient to be discharged home with wife. Active order for IV Rocephin and IV Decadron. Renal US ordered. D-Dimer >20. AC on hold d/t bloody BM    Following for PO AC. Will continue to follow for additional discharge needs.     Electronically signed by Graham Sanders RN on 12/25/2021 at 3:19 PM

## 2021-12-25 NOTE — PROGRESS NOTES
RN updated family on patients condition and left number to call back with further questions.  Electronically signed by Estuardo Trujillo RN on 12/25/2021 at 6:37 AM

## 2021-12-25 NOTE — PLAN OF CARE
Problem: Falls - Risk of:  Goal: Will remain free from falls  Description: Will remain free from falls  12/25/2021 1524 by Silva Byrd RN  Outcome: Ongoing     Problem: Falls - Risk of:  Goal: Absence of physical injury  Description: Absence of physical injury  12/25/2021 1524 by Silva Byrd RN  Outcome: Ongoing     Problem: Airway Clearance - Ineffective  Goal: Achieve or maintain patent airway  12/25/2021 1524 by Silva Byrd RN  Outcome: Ongoing     Problem: Gas Exchange - Impaired  Goal: Absence of hypoxia  12/25/2021 1524 by Silva Byrd RN  Outcome: Ongoing     Problem: Gas Exchange - Impaired  Goal: Promote optimal lung function  12/25/2021 1524 by Silva Byrd RN  Outcome: Ongoing     Problem: Breathing Pattern - Ineffective  Goal: Ability to achieve and maintain a regular respiratory rate  12/25/2021 1524 by Silva Byrd RN  Outcome: Ongoing     Problem: Nutrition Deficits  Goal: Optimize nutritional status  12/25/2021 1524 by Silva Byrd RN  Outcome: Ongoing     Problem: Risk for Fluid Volume Deficit  Goal: Maintain normal heart rhythm  12/25/2021 1524 by Silva Byrd RN  Outcome: Ongoing     Problem: Patient Education: Go to Patient Education Activity  Goal: Patient/Family Education  Outcome: Ongoing

## 2021-12-25 NOTE — PROGRESS NOTES
Spoke with wife Justin Perez and daughter Connor Joyner to update them on the patient condition. Family had no questions at this time.

## 2021-12-25 NOTE — CONSULTS
[facility]   CONSULT NOTE:      DATE OF CONSULT 12/25/2021    REASON FOR CONSULTATION:  Covid infection,      PCP Emery Rollins MD     CHIEF COMPLAINT: Altered mental status hypoxemia    HISTORY OF PRESENT ILLNESS:   There is an 57-year-old male who presented as a trauma alert after he was found facedown in the tub with his pants down by his wife. Patient had gone to the restroom and wife had not seen him for about an hour. The patient was found to be hypoxic when EMS arrived at 82% on room air. It was thought that he was diagnosed with Covid approximately 12/24. His Covid test here was positive on 12/23. In the emergency room, he was tachypneic tachycardic and febrile. His trauma work-up was essentially negative. The patient also had acute renal failure with a BUN of 61 creatinine 1.72. There was report that her potassium was 7.2 but I did not find that. Also, I could not find his positive test in care everywhere or at [facility]. The patient is confused, he does not know where he is at other than he is at the hospital.  He keeps on wanting to call his wife. And then he gets upset because Ferny Ferris is not calling here\". His c-collar was cleared in the emergency room. He also a fever of 101 in the emergency room. Initially been placed on a heparin drip, but this was discontinued after he was found to have bloody stools. He is a former smoker, quitting in [place] [address] and had approximately 30-year pack year history of smoking. ALLERGIES:  No Known Allergies    HOME MEDICATIONS:  Medications Prior to Admission: HYDROcodone-acetaminophen (NORCO)  MG per tablet, Take 1 tablet by mouth every 6 hours as needed for Pain for up to 30 days. gabapentin (NEURONTIN) 300 MG capsule, Take 1 capsule by mouth 2 times daily for 180 days.  Intended supply: 90 days  lisinopril (PRINIVIL;ZESTRIL) 20 MG tablet, Take 1 tablet by mouth daily  simvastatin (ZOCOR) 40 MG tablet, Take 1 tablet by mouth nightly  albuterol sulfate  (90 Base) MCG/ACT inhaler, Inhale 2 puffs into the lungs 4 times daily  fluticasone-salmeterol (ADVAIR) 250-50 MCG/DOSE AEPB, Inhale 1 puff into the lungs 2 times daily  albuterol (ACCUNEB) 1.25 MG/3ML nebulizer solution, Inhale 1 ampule into the lungs every 6 hours as needed      PAST MEDICAL HISTORY:  Past Medical History:   Diagnosis Date    Arthritis     Asthma     Hyperlipemia     Hypertension        PAST SURGICAL HISTORY:  Past Surgical History:   Procedure Laterality Date    TOTAL KNEE ARTHROPLASTY            SOCIAL HISTORY:  Social History     Socioeconomic History    Marital status:      Spouse name: Not on file    Number of children: Not on file    Years of education: Not on file    Highest education level: Not on file   Occupational History    Not on file   Tobacco Use    Smoking status: Former Smoker     Packs/day: 1.50     Years: 20.00     Pack years: 30.00     Quit date:      Years since quittin.0    Smokeless tobacco: Never Used   Substance and Sexual Activity    Alcohol use: Yes     Alcohol/week: 1.0 standard drink     Types: 1 Glasses of wine per week     Comment: COUPLE TIMES A WEEK WITH DINNER    Drug use: Never    Sexual activity: Not on file   Other Topics Concern    Not on file   Social History Narrative    Not on file     Social Determinants of Health     Financial Resource Strain: Low Risk     Difficulty of Paying Living Expenses: Not hard at all   Food Insecurity: No Food Insecurity    Worried About Running Out of Food in the Last Year: Never true    Gladys of Food in the Last Year: Never true   Transportation Needs: No Transportation Needs    Lack of Transportation (Medical): No    Lack of Transportation (Non-Medical):  No   Physical Activity: Inactive    Days of Exercise per Week: 0 days    Minutes of Exercise per Session: 0 min   Stress:     Feeling of Stress : Not on file   Social Connections:     Frequency of Communication with Friends and Family: Not on file    Frequency of Social Gatherings with Friends and Family: Not on file    Attends Holiness Services: Not on file    Active Member of Clubs or Organizations: Not on file    Attends Club or Organization Meetings: Not on file    Marital Status: Not on file   Intimate Partner Violence:     Fear of Current or Ex-Partner: Not on file    Emotionally Abused: Not on file    Physically Abused: Not on file    Sexually Abused: Not on file   Housing Stability:     Unable to Pay for Housing in the Last Year: Not on file    Number of [place] in the Last Year: Not on file    Unstable Housing in the Last Year: Not on file       FAMILY HISTORY:  History reviewed. No pertinent family history. REVIEW OF SYSTEMS:  All other systems reviewed and are negative. PHYSICAL EXAM:  Vital Signs Blood pressure (!) 141/85, pulse 101, temperature 97.6 °F (36.4 °C), temperature source Oral, resp. rate 18, height 5' 4\" (1.626 m), weight 172 lb 10.3 oz (78.3 kg), SpO2 93 %. Oxygen Amount and Delivery:      Admission Weight Weight: 186 lb 15.2 oz (84.8 kg)    General Appearance   Elderly gentleman in no acute respiratory distress wearing oxygen  Head  Normocephalic, without obvious abnormality, atraumatic    Eyes  conjunctivae/corneas clear. PERRL, EOM's intact. Fundi benign. ENT oral cavity is clear  Neck  no adenopathy, no carotid bruit, no JVD, supple, symmetrical, trachea midline and thyroid not enlarged, symmetric, no tenderness/mass/nodules  Lungs fairly clear  Heart: regular rate and rhythm, S1, S2 normal, no murmur, click, rub or gallop  Abdomen  soft, non-tender; bowel sounds normal; no masses,  no organomegaly  Extremities  No edema  Skin  Skin color, texture, turgor normal. No rashes or lesions  Neurologic: Alert and oriented X 3, normal strength and tone.          Imaging  Shows some motion artifact but otherwise very minimal left lower lobe atelectasis without any infiltrates        Lab Review  CBC     Lab Results   Component Value Date    WBC 18.7 12/25/2021    RBC 5.58 12/25/2021    HGB 16.1 12/25/2021    HCT 49.4 12/25/2021     12/25/2021    MCV 88.6 12/25/2021    MCH 28.9 12/25/2021    MCHC 32.6 12/25/2021    RDW 14.0 12/25/2021    LYMPHOPCT 27 12/23/2021    MONOPCT 3 12/23/2021    BASOPCT 1 12/23/2021    MONOSABS 0.10 12/23/2021    LYMPHSABS 1.20 12/23/2021    EOSABS 0.00 12/23/2021    BASOSABS 0.00 12/23/2021    DIFFTYPE NOT REPORTED 12/23/2021       BMP   Lab Results   Component Value Date     12/25/2021    K 4.7 12/25/2021     12/25/2021    CO2 15 12/25/2021    BUN 54 12/25/2021    CREATININE 1.28 12/25/2021    GLUCOSE 138 12/25/2021    CALCIUM 7.9 12/25/2021       LFTS  Lab Results   Component Value Date    ALKPHOS 78 12/23/2021    ALT 47 12/23/2021    AST 73 12/23/2021    PROT 7.6 12/23/2021    BILITOT 0.25 12/23/2021    LABALBU 4.3 12/23/2021       INR  Recent Labs     12/23/21  1705   PROTIME 13.4   INR 1.0       APTT  Recent Labs     12/23/21  1705   APTT 31.3       Lactic Acid  Lab Results   Component Value Date    LACTA 1.9 12/23/2021        PRO-BNP   No results for input(s): PROBNP in the last 72 hours.         ABGs:   Lab Results   Component Value Date    PHART 7.385 12/23/2021    PO2ART 71.1 12/23/2021    CUN6SNP 26.4 12/23/2021       Lab Results   Component Value Date    MODE NOT REPORTED 12/23/2021         Impression    Covid infection, tested +12/17  Hypoxemia  Acute mental status changes/encephalopathy  Acute kidney injury, improved  Elevated troponins  Elevated D-dimer  Possible GI bleed  Unvaccinated    Plan:      Droplet plus isolation  Initiation of Decadron  Told him to lay on his side or prone but do not think he would comply  Oxygen to keep saturation greater than 88%, he will need home oxygen evaluation for discharge  Gentle IV fluid hydration (patient received Lasix in the ER)  Awaiting

## 2021-12-26 LAB
-: NORMAL
-: NORMAL
ANION GAP SERPL CALCULATED.3IONS-SCNC: 9 MMOL/L (ref 9–17)
ANTI-XA UNFRAC HEPARIN: <0.1 IU/L (ref 0.3–0.7)
ANTI-XA UNFRAC HEPARIN: <0.1 IU/L (ref 0.3–0.7)
BUN BLDV-MCNC: 40 MG/DL (ref 8–23)
BUN/CREAT BLD: ABNORMAL (ref 9–20)
C-REACTIVE PROTEIN: 14.9 MG/L (ref 0–5)
CALCIUM SERPL-MCNC: 7.8 MG/DL (ref 8.6–10.4)
CHLORIDE BLD-SCNC: 109 MMOL/L (ref 98–107)
CHLORIDE, UR: 115 MMOL/L
CO2: 19 MMOL/L (ref 20–31)
CREAT SERPL-MCNC: 0.99 MG/DL (ref 0.7–1.2)
CREATININE URINE: 88.5 MG/DL (ref 39–259)
D-DIMER QUANTITATIVE: 2.97 MG/L FEU (ref 0–0.59)
FERRITIN: 1431 UG/L (ref 30–400)
FERRITIN: 9244 UG/L (ref 30–400)
FREE KAPPA/LAMBDA RATIO: 0.88 (ref 0.26–1.65)
GFR AFRICAN AMERICAN: >60 ML/MIN
GFR NON-AFRICAN AMERICAN: >60 ML/MIN
GFR SERPL CREATININE-BSD FRML MDRD: ABNORMAL ML/MIN/{1.73_M2}
GFR SERPL CREATININE-BSD FRML MDRD: ABNORMAL ML/MIN/{1.73_M2}
GLUCOSE BLD-MCNC: 115 MG/DL (ref 75–110)
GLUCOSE BLD-MCNC: 115 MG/DL (ref 75–110)
GLUCOSE BLD-MCNC: 127 MG/DL (ref 70–99)
GLUCOSE BLD-MCNC: 127 MG/DL (ref 75–110)
GLUCOSE BLD-MCNC: 138 MG/DL (ref 75–110)
HCT VFR BLD CALC: 43.2 % (ref 41–53)
HEMOGLOBIN: 14.1 G/DL (ref 13.5–17.5)
KAPPA FREE LIGHT CHAINS QNT: 2.64 MG/DL (ref 0.37–1.94)
LACTATE DEHYDROGENASE: 388 U/L (ref 135–225)
LAMBDA FREE LIGHT CHAINS QNT: 3 MG/DL (ref 0.57–2.63)
MCH RBC QN AUTO: 29 PG (ref 26–34)
MCHC RBC AUTO-ENTMCNC: 32.7 G/DL (ref 31–37)
MCV RBC AUTO: 88.6 FL (ref 80–100)
NRBC AUTOMATED: ABNORMAL PER 100 WBC
PDW BLD-RTO: 13.9 % (ref 11.5–14.9)
PLATELET # BLD: 169 K/UL (ref 150–450)
PMV BLD AUTO: 9.1 FL (ref 6–12)
POTASSIUM SERPL-SCNC: 4.7 MMOL/L (ref 3.7–5.3)
RBC # BLD: 4.87 M/UL (ref 4.5–5.9)
REASON FOR REJECTION: NORMAL
REASON FOR REJECTION: NORMAL
SODIUM BLD-SCNC: 137 MMOL/L (ref 135–144)
SODIUM,UR: 107 MMOL/L
TOTAL CK: 3582 U/L (ref 39–308)
TOTAL PROTEIN, URINE: 45 MG/DL
URINE TOTAL PROTEIN CREATININE RATIO: 0.51 (ref 0–0.2)
WBC # BLD: 20.3 K/UL (ref 3.5–11)
ZZ NTE CLEAN UP: ORDERED TEST: NORMAL
ZZ NTE CLEAN UP: ORDERED TEST: NORMAL
ZZ NTE WITH NAME CLEAN UP: SPECIMEN SOURCE: NORMAL
ZZ NTE WITH NAME CLEAN UP: SPECIMEN SOURCE: NORMAL

## 2021-12-26 PROCEDURE — 36415 COLL VENOUS BLD VENIPUNCTURE: CPT

## 2021-12-26 PROCEDURE — 82550 ASSAY OF CK (CPK): CPT

## 2021-12-26 PROCEDURE — 82947 ASSAY GLUCOSE BLOOD QUANT: CPT

## 2021-12-26 PROCEDURE — 6370000000 HC RX 637 (ALT 250 FOR IP): Performed by: INTERNAL MEDICINE

## 2021-12-26 PROCEDURE — 84300 ASSAY OF URINE SODIUM: CPT

## 2021-12-26 PROCEDURE — 97166 OT EVAL MOD COMPLEX 45 MIN: CPT

## 2021-12-26 PROCEDURE — C9113 INJ PANTOPRAZOLE SODIUM, VIA: HCPCS | Performed by: PHYSICIAN ASSISTANT

## 2021-12-26 PROCEDURE — 83883 ASSAY NEPHELOMETRY NOT SPEC: CPT

## 2021-12-26 PROCEDURE — 82728 ASSAY OF FERRITIN: CPT

## 2021-12-26 PROCEDURE — 84156 ASSAY OF PROTEIN URINE: CPT

## 2021-12-26 PROCEDURE — 6360000002 HC RX W HCPCS: Performed by: PHYSICIAN ASSISTANT

## 2021-12-26 PROCEDURE — 80048 BASIC METABOLIC PNL TOTAL CA: CPT

## 2021-12-26 PROCEDURE — 83615 LACTATE (LD) (LDH) ENZYME: CPT

## 2021-12-26 PROCEDURE — 85027 COMPLETE CBC AUTOMATED: CPT

## 2021-12-26 PROCEDURE — 2580000003 HC RX 258: Performed by: INTERNAL MEDICINE

## 2021-12-26 PROCEDURE — 86140 C-REACTIVE PROTEIN: CPT

## 2021-12-26 PROCEDURE — 6360000002 HC RX W HCPCS: Performed by: INTERNAL MEDICINE

## 2021-12-26 PROCEDURE — 85520 HEPARIN ASSAY: CPT

## 2021-12-26 PROCEDURE — 85379 FIBRIN DEGRADATION QUANT: CPT

## 2021-12-26 PROCEDURE — 2500000003 HC RX 250 WO HCPCS: Performed by: INTERNAL MEDICINE

## 2021-12-26 PROCEDURE — 2060000000 HC ICU INTERMEDIATE R&B

## 2021-12-26 PROCEDURE — 97535 SELF CARE MNGMENT TRAINING: CPT

## 2021-12-26 PROCEDURE — 82570 ASSAY OF URINE CREATININE: CPT

## 2021-12-26 PROCEDURE — 82436 ASSAY OF URINE CHLORIDE: CPT

## 2021-12-26 PROCEDURE — 2580000003 HC RX 258: Performed by: PHYSICIAN ASSISTANT

## 2021-12-26 PROCEDURE — 97530 THERAPEUTIC ACTIVITIES: CPT

## 2021-12-26 PROCEDURE — 99232 SBSQ HOSP IP/OBS MODERATE 35: CPT | Performed by: INTERNAL MEDICINE

## 2021-12-26 PROCEDURE — 2580000003 HC RX 258: Performed by: STUDENT IN AN ORGANIZED HEALTH CARE EDUCATION/TRAINING PROGRAM

## 2021-12-26 RX ORDER — DEXAMETHASONE 6 MG/1
6 TABLET ORAL
Qty: 8 TABLET | Refills: 0 | Status: SHIPPED | OUTPATIENT
Start: 2021-12-26 | End: 2022-01-05

## 2021-12-26 RX ADMIN — ATORVASTATIN CALCIUM 20 MG: 20 TABLET, FILM COATED ORAL at 09:02

## 2021-12-26 RX ADMIN — SODIUM BICARBONATE: 84 INJECTION, SOLUTION INTRAVENOUS at 13:43

## 2021-12-26 RX ADMIN — GABAPENTIN 300 MG: 300 CAPSULE ORAL at 20:58

## 2021-12-26 RX ADMIN — PANTOPRAZOLE SODIUM 40 MG: 40 INJECTION, POWDER, FOR SOLUTION INTRAVENOUS at 09:02

## 2021-12-26 RX ADMIN — SODIUM CHLORIDE: 9 INJECTION, SOLUTION INTRAVENOUS at 05:07

## 2021-12-26 RX ADMIN — GABAPENTIN 300 MG: 300 CAPSULE ORAL at 09:02

## 2021-12-26 RX ADMIN — BUDESONIDE AND FORMOTEROL FUMARATE DIHYDRATE 2 PUFF: 160; 4.5 AEROSOL RESPIRATORY (INHALATION) at 19:20

## 2021-12-26 RX ADMIN — DEXAMETHASONE SODIUM PHOSPHATE 6 MG: 4 INJECTION, SOLUTION INTRA-ARTICULAR; INTRALESIONAL; INTRAMUSCULAR; INTRAVENOUS; SOFT TISSUE at 09:02

## 2021-12-26 RX ADMIN — SODIUM CHLORIDE, PRESERVATIVE FREE 10 ML: 5 INJECTION INTRAVENOUS at 09:03

## 2021-12-26 ASSESSMENT — PAIN SCALES - GENERAL
PAINLEVEL_OUTOF10: 0
PAINLEVEL_OUTOF10: 4
PAINLEVEL_OUTOF10: 8
PAINLEVEL_OUTOF10: 0

## 2021-12-26 ASSESSMENT — PAIN DESCRIPTION - ORIENTATION: ORIENTATION: RIGHT;LEFT

## 2021-12-26 ASSESSMENT — PAIN DESCRIPTION - PAIN TYPE
TYPE: CHRONIC PAIN
TYPE: CHRONIC PAIN

## 2021-12-26 ASSESSMENT — PAIN DESCRIPTION - LOCATION: LOCATION: LEG

## 2021-12-26 NOTE — CONSULTS
[facility]  In Patient Cardiology Progress Note             Date:   2021  Patient name: Chuy Mojica  Date of admission:  2021  4:24 PM  MRN:   [id number]  YOB: 1940    Reason for Admission:  COVID    CHIEF COMPLAINT:  COVID     History Obtained From:  Pt and chart    HISTORY OF PRESENT ILLNESS:    This is an 80year old male who presents due to fall. Wife found him down in bathroom. EMS was called. Found him confused and hypoxic. He was dx with covid 7 days ago. C- collar in place. We were consulted for elevated troponins. Per nursing no CP. Sinus tachy on monitor- . Just recently taken off O2- now on room air. Was on heparin drip, which is on hold, due to a bloody bowel movement. Past Medical History:    Past Medical History:   Diagnosis Date    Arthritis     Asthma     Hyperlipemia     Hypertension          Past Surgical History:    Past Surgical History:   Procedure Laterality Date    TOTAL KNEE ARTHROPLASTY           Home Medications:    No outpatient medications have been marked as taking for the 21 encounter [facility] Encounter). Allergies:  Patient has no known allergies. Social History:    Social History     Socioeconomic History    Marital status:      Spouse name: None    Number of children: None    Years of education: None    Highest education level: None   Occupational History    None   Tobacco Use    Smoking status: Former Smoker     Packs/day: 1.50     Years: 20.00     Pack years: 30.00     Quit date:      Years since quittin.0    Smokeless tobacco: Never Used   Substance and Sexual Activity    Alcohol use:  Yes     Alcohol/week: 1.0 standard drink     Types: 1 Glasses of wine per week     Comment: COUPLE TIMES A WEEK WITH DINNER    Drug use: Never    Sexual activity: None   Other Topics Concern    None   Social History Narrative    None     Social Determinants of Health Financial Resource Strain: Low Risk     Difficulty of Paying Living Expenses: Not hard at all   Food Insecurity: No Food Insecurity    Worried About Running Out of Food in the Last Year: Never true    Gladys of Food in the Last Year: Never true   Transportation Needs: No Transportation Needs    Lack of Transportation (Medical): No    Lack of Transportation (Non-Medical): No   Physical Activity: Inactive    Days of Exercise per Week: 0 days    Minutes of Exercise per Session: 0 min   Stress:     Feeling of Stress : Not on file   Social Connections:     Frequency of Communication with Friends and Family: Not on file    Frequency of Social Gatherings with Friends and Family: Not on file    Attends Mandaen Services: Not on file    Active Member of Clubs or Organizations: Not on file    Attends Club or Organization Meetings: Not on file    Marital Status: Not on file   Intimate Partner Violence:     Fear of Current or Ex-Partner: Not on file    Emotionally Abused: Not on file    Physically Abused: Not on file    Sexually Abused: Not on file   Housing Stability:     Unable to Pay for Housing in the Last Year: Not on file    Number of [place] in the Last Year: Not on file    Unstable Housing in the Last Year: Not on file        Family History:   History reviewed. No pertinent family history. REVIEW OF SYSTEMS:    · Unable to obtain     PHYSICAL EXAM:    Physical Examination:    BP (!) 120/104   Pulse 98   Temp 98.2 °F (36.8 °C) (Oral)   Resp 16   Ht 5' 4\" (1.626 m)   Wt 176 lb 2.4 oz (79.9 kg)   SpO2 95%   BMI 30.24 kg/m²    · deferred     DATA:    Diagnostics:      Echocardiogram:     Summary  Technically difficult study, not all walls well visualized. Left ventricle is normal in size and wall thickness. Global left ventricular systolic function is normal. Estimated LV EF 55 %. No significant valvular regurgitation or stenosis seen.   Anterior echo free space suggestive of adipose tissue.     Signature    ----------------------------------------------------------------------------   Electronically signed by Markus Butcher(Interpreting physician) on 12/24/2021   12:26 PM    EKG:   Results for orders placed or performed during the hospital encounter of 12/23/21   EKG 12 Lead   Result Value Ref Range    Ventricular Rate 124 BPM    Atrial Rate 124 BPM    P-R Interval 196 ms    QRS Duration 86 ms    Q-T Interval 296 ms    QTc Calculation (Bazett) 425 ms    P Axis 72 degrees    R Axis -8 degrees    T Axis 39 degrees    Narrative    Sinus tachycardia  Low voltage QRS  Cannot rule out Anterior infarct , age undetermined  Abnormal ECG  No previous ECGs available     Labs:     CBC:   Recent Labs     12/25/21 0458 12/26/21 0524   WBC 18.7* 20.3*   HGB 16.1 14.1   HCT 49.4 43.2    169     BMP:   Recent Labs     12/25/21 0458 12/26/21 0524    137   K 4.7 4.7   CO2 15* 19*   BUN 54* 40*   CREATININE 1.28* 0.99   LABGLOM 54* >60   GLUCOSE 138* 127*     BNP: No results for input(s): BNP in the last 72 hours. PT/INR:   Recent Labs     12/23/21 1705   PROTIME 13.4   INR 1.0     APTT:  Recent Labs     12/23/21 1705   APTT 31.3     CARDIAC ENZYMES:  Recent Labs     12/23/21 1705 12/23/21 2055 12/24/21  1059   TROPHS 50* 115* 58*     FASTING LIPID PANEL:  Lab Results   Component Value Date    HDL 65 10/12/2021    TRIG 128 10/12/2021     LIVER PROFILE:  Recent Labs     12/23/21 1705   AST 73*   ALT 47*   LABALBU 4.3     Results for Tamara Reid (MRN [id number]) as of 12/26/2021 10:52   Ref.  Range 12/23/2021 17:05 12/23/2021 20:55 12/24/2021 10:59   Troponin, High Sensitivity Latest Ref Range: 0 - 22 ng/L 50 (H) 115 (HH) 58 (HH)       IMPRESSION:    Patient Active Problem List   Diagnosis    Essential hypertension    Osteoarthritis of lumbosacral spine    Osteoarthritis of cervical spine with myelopathy    DANIELLE (acute kidney injury) ([facility])     1. - Elevated troponin- in setting of covid, DANIELLE- Most likely type 2. Echocardiogram normal with no wall motion abnormality, confirming type 2  2. - COVID 19  3. - DANIELLE  4. - Syncope due to dehydration   5. - Fall at home  6. - Bloody bowel movement    RECOMMENDATIONS:  - Monitor Trop-HS  - No heparin due to bloody BM  - check 2d echo  - No further evaluation, and will monitor from distance. Discussed with nursing. Haydee Estrada M.D. [facility]  [facility]. [facility]  [phone]

## 2021-12-26 NOTE — PROGRESS NOTES
[place] [address] Internal Medicine    Progress Note    12/26/2021    4:05 PM    Name:   Khushi Palma  MRN:     [id number]     Acct:      [de-identified]   Room:   [facility]/-70  IP Day:  3  Admit Date:  12/23/2021  4:24 PM    PCP:   Rebecca Grace MD  Code Status:  Full Code    Subjective:     C/C:   Chief Complaint   Patient presents with   Daneil Wesley         Interval History Status: Improving    HPI:     See H&P    Review of Systems:     Denies any shortness of breath or cough  Denies chest pain or palpitations  Denies abdominal pain, diarrhea vomiting  Denies any new numbness tremors or weakness. Medications: Allergies:  No Known Allergies    Current Meds:   Scheduled Meds:    dexamethasone  6 mg IntraVENous Daily    cefTRIAXone (ROCEPHIN) IV  1,000 mg IntraVENous Q24H    insulin lispro  0-6 Units SubCUTAneous TID WC    insulin lispro  0-3 Units SubCUTAneous Nightly    budesonide-formoterol  2 puff Inhalation BID    gabapentin  300 mg Oral BID    atorvastatin  20 mg Oral Daily    pantoprazole  40 mg IntraVENous Daily    And    sodium chloride (PF)  10 mL IntraVENous Daily     Continuous Infusions:    IV infusion builder 50 mL/hr at 12/26/21 1343    dextrose      [Held by provider] heparin (PORCINE) Infusion Stopped (12/24/21 0645)     PRN Meds: ondansetron, acetaminophen, sodium chloride flush, glucose, dextrose, glucagon (rDNA), dextrose, heparin (porcine), heparin (porcine)    Data:     Past Medical History:   has a past medical history of Arthritis, Asthma, Hyperlipemia, and Hypertension. Social History:   reports that he quit smoking about 36 years ago. He has a 30.00 pack-year smoking history. He has never used smokeless tobacco. He reports current alcohol use of about 1.0 standard drink of alcohol per week. He reports that he does not use drugs. Family History: History reviewed. No pertinent family history.     Vitals:  BP (!) 120/104   Pulse 98   Temp 98.2 °F (36.8 °C) (Oral)   Resp 16   Ht 5' 4\" (1.626 m)   Wt 176 lb 2.4 oz (79.9 kg)   SpO2 95%   BMI 30.24 kg/m²   Temp (24hrs), Av.1 °F (36.7 °C), Min:97.8 °F (36.6 °C), Max:98.4 °F (36.9 °C)    Recent Labs     21  1619 21  0700 21  1101   POCGLU 133* 170* 115* 115*       I/O (24Hr): Intake/Output Summary (Last 24 hours) at 2021 1605  Last data filed at 2021 0500  Gross per 24 hour   Intake --   Output 650 ml   Net -650 ml       Labs:    Lab Results   Component Value Date    WBC 20.3 (H) 2021    HGB 14.1 2021    HCT 43.2 2021    MCV 88.6 2021     2021     Lab Results   Component Value Date     2021    K 4.7 2021     2021    CO2 19 2021    BUN 40 2021    CREATININE 0.99 2021    GLUCOSE 127 2021    CALCIUM 7.8 2021          Lab Results   Component Value Date/Time    SPECIAL NOT REPORTED 2021 06:49 PM     Lab Results   Component Value Date/Time    CULTURE NO GROWTH 2021 06:49 PM         Radiology:    Recent data reviewed    Physical Examination:        General appearance:  alert, cooperative and no distress  Eyes: Anicteric sclera. Pupils are equally round and reactive to light. Extraocular movements are intact.   Lungs:  clear to auscultation bilaterally, normal effort, no wheeze or crackles  Heart:  regular rate and rhythm, no murmur  Abdomen:  soft, nontender, no masses     Extremities:  no edema, redness, tenderness in the calves  Skin:  no gross lesions, rashes, induration  Neuro:  Alert, , no new focal weakness  Assessment:        Primary Problem  <principal problem not specified>    Active Hospital Problems    Diagnosis Date Noted    DANIELLE (acute kidney injury) ([address] [N17.9] 2021    Essential hypertension [I10] 06/15/2021           DVT prophylaxis: Heparin infusion  Antibiotics:    Plan:        Sepsis secondary to COVID-19 pneumonia, suspected superimposed bacterial pneumonia-started on Decadron, Rocephin,  no growth on blood cultures   hypoxia-saturating 85% on presentation, improved on 2 L nasal cannula oxygen  acute metabolic encephalopathy likely secondary to hypoxia. DANIELLE-creatinine 2.08 hyperventilation baseline 0.8, nephrology consulted, patient on saline infusion after sepsis bolus  Severe hyperkalemia 7.2 with no EKG changes, given calcium gluconate, insulin dextrose, nephrology consulted  Anion gap metabolic acidosis likely secondary to DANIELLE  Hyponatremia sodium 129  Elevated troponin-uptrending, no acute changes on EKG, started on heparin infusion, echo ordered, cardiology consulted  Elevated CK- will repeat  Fall with head injury-CT head unremarkable assessed by trauma surgery in ER  Hyperglycemia-sliding scale insulin, check HbA1c  Diarrhea-C. difficile -negative  Hematochezia-Heparin infusion held, H&H monitoring, GI consult  History of hypertension-home medications currently held  Hematuria-no clots, Grewal inserted, will monitor     12/25  Appears less confused tday  No growth on blood cultures, C. difficile negative  Diarrhea has stopped no further bloody BM  Saturating well on room air  Creatinine improved to 1.28, potassium normal, sodium normal  Still mild metabolic acidosis bicarb of 15  Hemoglobin stable no further rectal bleed- will get renal US, start rocephin  Echo awaited    12/26  Having bloody BM again, awaiting GI recs,  Attempted rectal exam twice but patient not available. DANIELLE resolved, remains on room air  Hemoglobin stable no further bloody bowel movements.   Echo does not show motion abnormality  Urine culture no growth 2 days-will DC antibiotics  Heparin stopped, appreciate cardiology recs    Code status- full        Radha Napier MD  12/26/2021  4:05 PM

## 2021-12-26 NOTE — CARE COORDINATION
ONGOING DISCHARGE PLAN:    Patient is alert and oriented x4. Spoke with patient regarding discharge plan and patient confirms that plan is still to return to home. COVID + - On room air    GI consult for blood stools - Hgb 14.1    Orange Header - 11%    Will continue to follow for additional discharge needs.     Electronically signed by David Gibsb RN on 12/26/2021 at 3:45 PM

## 2021-12-26 NOTE — PLAN OF CARE
Problem: Falls - Risk of:  Goal: Will remain free from falls  Description: Will remain free from falls  12/26/2021 0253 by Denia Schmid RN  Outcome: Ongoing  Note: Pt free of falls. Call light and bedside table within reach. Bed locked and in lowest position, nonskid socks on feet.        Problem: Airway Clearance - Ineffective  Goal: Achieve or maintain patent airway  12/26/2021 0253 by Denia Schmid RN  Outcome: Ongoing  Note: Pt maintained patent airway     Problem: Isolation Precautions - Risk of Spread of Infection  Goal: Prevent transmission of infection  Outcome: Ongoing  Note: Pt in droplet isolation with negative pressure

## 2021-12-26 NOTE — PROGRESS NOTES
I made sure the patient is on Dr. Light Nixon list. This was done at 6:30 pm on 12/26/21. [address] [place] [address]

## 2021-12-26 NOTE — PROGRESS NOTES
RN spoke with pts daughter & wife and gave them an update. The daughter mentioned wanting a call after the doctor rounds with an update on what the plan is, especially regarding the bloody bowel movements.

## 2021-12-26 NOTE — CONSULTS
reviewed. No pertinent family history. Social History     Socioeconomic History    Marital status:      Spouse name: None    Number of children: None    Years of education: None    Highest education level: None   Occupational History    None   Tobacco Use    Smoking status: Former Smoker     Packs/day: 1.50     Years: 20.00     Pack years: 30.00     Quit date:      Years since quittin.0    Smokeless tobacco: Never Used   Substance and Sexual Activity    Alcohol use: Yes     Alcohol/week: 1.0 standard drink     Types: 1 Glasses of wine per week     Comment: COUPLE TIMES A WEEK WITH DINNER    Drug use: Never    Sexual activity: None   Other Topics Concern    None   Social History Narrative    None     Social Determinants of Health     Financial Resource Strain: Low Risk     Difficulty of Paying Living Expenses: Not hard at all   Food Insecurity: No Food Insecurity    Worried About Running Out of Food in the Last Year: Never true    Gladys of Food in the Last Year: Never true   Transportation Needs: No Transportation Needs    Lack of Transportation (Medical): No    Lack of Transportation (Non-Medical):  No   Physical Activity: Inactive    Days of Exercise per Week: 0 days    Minutes of Exercise per Session: 0 min   Stress:     Feeling of Stress : Not on file   Social Connections:     Frequency of Communication with Friends and Family: Not on file    Frequency of Social Gatherings with Friends and Family: Not on file    Attends Orthodox Services: Not on file    Active Member of Clubs or Organizations: Not on file    Attends Club or Organization Meetings: Not on file    Marital Status: Not on file   Intimate Partner Violence:     Fear of Current or Ex-Partner: Not on file    Emotionally Abused: Not on file    Physically Abused: Not on file    Sexually Abused: Not on file   Housing Stability:     Unable to Pay for Housing in the Last Year: Not on file    Number of Places Lived in the Last Year: Not on file    Unstable Housing in the Last Year: Not on file     Review of systems: CNS - no headache or dizziness; Cardiac - no chest pain; Respiratory - no shortness of breath; Gastrointestinal -   No nausea, vomiting or diarrhea but has bleeding per rectum; Musculoskeletal - general body aches; Skin/Integument - no rashes. Physical Exam:    VITALS:  BP (!) 120/104   Pulse 98   Temp 98.2 °F (36.8 °C) (Oral)   Resp 16   Ht 5' 4\" (1.626 m)   Wt 176 lb 2.4 oz (79.9 kg)   SpO2 95%   BMI 30.24 kg/m²   24HR INTAKE/OUTPUT:    Intake/Output Summary (Last 24 hours) at 12/26/2021 1217  Last data filed at 12/26/2021 0500  Gross per 24 hour   Intake --   Output 650 ml   Net -650 ml       Constitutional: alert, appears stated age and cooperative    Skin: Skin color, texture, turgor normal. No rashes or lesions    Head: Normocephalic, without obvious abnormality, atraumatic     Cardiovascular/Edema: regular rate and rhythm, S1, S2 normal, no murmur, click, rub or gallop    Respiratory: Lungs: clear to auscultation bilaterally    Abdomen: soft, non-tender; bowel sounds normal; no masses,  no organomegaly    Back: symmetric, no curvature. ROM normal. No CVA tenderness.     Extremities: extremities normal, atraumatic, no cyanosis or edema    Neuro:  Grossly normal    CBC:   Recent Labs     12/24/21  0700 12/25/21  0458 12/26/21  0524   WBC 9.2 18.7* 20.3*   HGB 16.6  16.6 16.1 14.1    172 169     BMP:    Recent Labs     12/24/21  0444 12/25/21  0458 12/26/21  0524    136 137   K 5.3 4.7 4.7    109* 109*   CO2 16* 15* 19*   BUN 57* 54* 40*   CREATININE 1.57* 1.28* 0.99   GLUCOSE 134* 138* 127*       Lab Results   Component Value Date    NITRU NEGATIVE 12/23/2021    COLORU Yellow 12/23/2021    PHUR 5.0 12/23/2021    WBCUA 0 TO 2 12/23/2021    RBCUA 2 TO 5 12/23/2021    MUCUS NOT REPORTED 12/23/2021    TRICHOMONAS NOT REPORTED 12/23/2021    YEAST NOT REPORTED 12/23/2021 BACTERIA MANY 12/23/2021    SPECGRAV 1.025 12/23/2021    LEUKOCYTESUR NEGATIVE 12/23/2021    UROBILINOGEN Normal 12/23/2021    BILIRUBINUR NEGATIVE 12/23/2021    GLUCOSEU NEGATIVE 12/23/2021    KETUA NEGATIVE 12/23/2021    AMORPHOUS 2+ 12/23/2021     IMPRESSION/RECOMMENDATIONS:      1. Acute kidney injury - most consistent with prerenal azotemia. Renal function is improved with hydration and he is currently on 0.9 normal saline at 100 mL/h. Plan: Decrease IV fluid to 50 mL/h. Urinalysis and urine microscopy. Random urine protein/creatinine ratio. Basic metabolic profile daily. 2.  Hypertension - blood pressure control is adequate. 3.  COVID-19 infection -  minimal oxygen requirements. Continue dexamethasone. 4.  Non-anion gap metabolic acidosis - consistent with renal tubular acidosis. Add bicarbonate to IV fluid. Prognosis is guarded. Thank you very much for the courtesy and confidence of this consultation.       Rossy Rodriguez MD FACP  Attending Nephrologist  12/26/2021 12:13 PM

## 2021-12-26 NOTE — PROGRESS NOTES
[facility]    Occupational Therapy Evaluation  Date: 21  Patient Name: Khushi Palma       Room: /-05  MRN: [id number]  Account: [de-identified]   : 1940  (80 y.o.) Gender: male     Discharge Recommendations:  Further Occupational Therapy is recommended upon facility discharge. Referring Practitioner: Concepcion Bain MD  Diagnosis: Hyperkalemia; COVID-19       Treatment Diagnosis: Impaired self care status  Past Medical History:  has a past medical history of Arthritis, Asthma, Hyperlipemia, and Hypertension. Past Surgical History:   has a past surgical history that includes Total knee arthroplasty.     Restrictions  Restrictions/Precautions: Isolation,Fall Risk (COVID+)  Implants present? : Metal implants (B MAHOGANY,. B TKA)  Required Braces or Orthoses?: No     Vitals  Temp: 98.2 °F (36.8 °C)  Pulse: 98  Resp: 16  BP: (!) 120/104  Height: 5' 4\" (162.6 cm)  Weight: 176 lb 2.4 oz (79.9 kg)  BMI (Calculated): 30.3  Oxygen Therapy  SpO2: 95 %  Pulse Oximeter Device Mode: Continuous  Pulse Oximeter Device Location: Finger  O2 Device: None (Room air)  Skin Assessment: Clean, dry, & intact  Skin Protection for O2 Device: No  O2 Flow Rate (L/min): 1 L/min  Blood Gas  Performed?: Yes  Kurtis's Test #1: Collateral flow confirmed  Site #1: Right Radial  Site Prepped #1: Yes  Number of Attempts #1: 1  Pressure Held #1: Yes  Complications #1: None  Post-procedure #1: Standard  Specimen Status #1: Point of care  How Tolerated?: Not assessed  Level of Consciousness: Alert (0)    Subjective  Subjective: Pt resting in bed upon arrival. Pt was pleasant and agreeable to OT eval  Comments: Ok josé luis Chavez for OT eval  Overall Orientation Status: Impaired  Orientation Level: Oriented to place,Oriented to time,Oriented to person,Disoriented to situation (Does not recall falling)  Vision  Vision: Impaired  Vision Exceptions: Wears glasses for reading  Hearing  Hearing: Exceptions to WFL  Hearing Exceptions: Hard of hearing/hearing concerns,Right hearing aid (Does not have in room)  Social/Functional History  Lives With: Spouse  Type of Home: House  Home Layout: One level  Home Access: Ramped entrance  Bathroom Shower/Tub: Tub/Shower unit,Curtain  Bathroom Toilet: Handicap height  Home Equipment: Cane,Rolling walker,Wheelchair-manual  ADL Assistance: Independent  Homemaking Assistance: Independent  Homemaking Responsibilities: Yes  Ambulation Assistance: Independent (with cane)  Transfer Assistance: Independent  Active : Yes  Mode of Transportation: SUV  Additional Comments: Pt reports that daughter is able to assist as needed  Pain Assessment  Pain Assessment: 0-10  Pain Level: 8  Pain Type: Chronic pain  Pain Location: Leg  Pain Orientation: Right,Left    Objective          Sensation  Overall Sensation Status: WFL (Pt denies)   ADL  Feeding: Setup  Grooming: Setup  UE Bathing: Minimal assistance  LE Bathing: Maximum assistance  UE Dressing: Minimal assistance  LE Dressing: Maximum assistance  Toileting: Dependent/Total (Grewal catheter)  Additional Comments: ADL scores based on clinical reasoning and skilled observation unless otherwise noted. Brief change and pericare completed with pt standing EOB with RW on this date. Pt currently limited due to decreased strength, balance, activity tolerance impacting safety and independence with self care tasks    UE Function           LUE Strength  Gross LUE Strength: WFL  L Hand General: 4-/5     LUE Tone: Normotonic     LUE AROM (degrees)  LUE AROM : WFL     Left Hand AROM (degrees)  Left Hand AROM: WFL  RUE Strength  R Hand General: 4-/5  RUE Strength Comment: Shoulder 3-/5; otherwise 4-/5      RUE Tone: Normotonic     RUE AROM (degrees)  RUE General AROM: ~90 degrees shoulder flexion; otherwise WFL     Right Hand AROM (degrees)  Right Hand AROM: WFL    Fine Motor Skills  Coordination  Movements Are Fluid And Coordinated:  Yes Mobility  Supine to Sit: Minimal assistance  Sit to Supine: Minimal assistance       Balance  Sitting Balance: Minimal assistance (Min A- CGA intermittent posterior LOB)  Standing Balance: Minimal assistance  Standing Balance  Time: 1-2 minutes x 2  Activity: functional mobility, pericare standing EOB  Comment: with RW  Functional Mobility  Functional - Mobility Device: Rolling Walker  Activity: Other (small steps fowards and backwards x 2)  Assist Level: Minimal assistance  Functional Mobility Comments: Verbal cues for hand placement and safety. O2 monitored thorughout with O2 sats between 90-95% on room air. Bed mobility  Supine to Sit: Minimal assistance  Sit to Supine: Minimal assistance  Comment: Bed mobility completed with HOB elevated     Transfers  Sit to stand: Minimal assistance  Stand to sit: Minimal assistance  Transfer Comments: Verbal cues for hand placement and safety  Functional Activity Tolerance  Functional Activity Tolerance:  Tolerates 10 - 20 min exercise with multiple rests     Assessment  Assessment  Performance deficits / Impairments: Decreased ADL status,Decreased functional mobility ,Decreased strength,Decreased safe awareness,Decreased endurance,Decreased balance,Decreased high-level IADLs  Treatment Diagnosis: Impaired self care status  Prognosis: Good  Decision Making: Medium Complexity  REQUIRES OT FOLLOW UP: Yes  Discharge Recommendations: Patient would benefit from continued therapy after discharge  Activity Tolerance: Patient limited by fatigue         Functional Outcome Measures  AM-PAC Daily Activity Inpatient   How much help for putting on and taking off regular lower body clothing?: A Lot  How much help for Bathing?: A Lot  How much help for Toileting?: Total  How much help for putting on and taking off regular upper body clothing?: A Little  How much help for taking care of personal grooming?: A Little  How much help for eating meals?: A Little  AM-[facility] Inpatient Daily Activity Raw Score: 14  AM-PAC Inpatient ADL T-Scale Score : 33.39  ADL Inpatient CMS 0-100% Score: 59.67  ADL Inpatient CMS G-Code Modifier : CK       Goals  Patient Goals   Patient goals : To get stronger  Short term goals  Time Frame for Short term goals: By discharge  Short term goal 1: Pt will complete lower body dressing/bathing with min A and good safety with use of AE as needed  Short term goal 2: Pt will complete functional transfers/mobility during self care tasks with SBA and good safety while maintaining SpO2 above 90%  Short term goal 3: Pt will tolerate standing 5+ minutes during functional activity of choice with good safety and SBA while maintaining SpO2 above 90%  Short term goal 4: Pt will verbalize/demosntrate good understanding of energy conservation/fall preventoin strategies to increase safety and independence with self care and mobility  Short term goal 5: Pt will participate in 15+ minutes of therapeutic exercises/functional activities to increase safety and independence with self care and mobility    Plan  Safety Devices  Safety Devices in place: Yes  Type of devices:  All fall risk precautions in place,Bed alarm in place,Call light within reach,Gait belt,Patient at risk for falls,Left in bed,Nurse notified     Plan  Times per week: 3-5  Current Treatment Recommendations: Self-Care / ADL,Strengthening,Balance Training,Functional Mobility Training,Endurance Training,Safety Education & Training,Patient/Caregiver Education & Training,Equipment Evaluation, Education, & procurement,Home Management Training          OT Individual Minutes  Time In: 8502  Time Out: 2496  Minutes: 39    Electronically signed by Elizabeth Blanton OT on 12/26/21 at 4:53 PM EST

## 2021-12-27 ENCOUNTER — APPOINTMENT (OUTPATIENT)
Dept: ULTRASOUND IMAGING | Age: 81
DRG: 871 | End: 2021-12-27
Payer: MEDICARE

## 2021-12-27 VITALS
HEART RATE: 55 BPM | WEIGHT: 174.38 LBS | HEIGHT: 64 IN | DIASTOLIC BLOOD PRESSURE: 65 MMHG | BODY MASS INDEX: 29.77 KG/M2 | TEMPERATURE: 98.3 F | SYSTOLIC BLOOD PRESSURE: 135 MMHG | RESPIRATION RATE: 18 BRPM | OXYGEN SATURATION: 93 %

## 2021-12-27 LAB
ANION GAP SERPL CALCULATED.3IONS-SCNC: 9 MMOL/L (ref 9–17)
BUN BLDV-MCNC: 37 MG/DL (ref 8–23)
BUN/CREAT BLD: ABNORMAL (ref 9–20)
CALCIUM SERPL-MCNC: 8.1 MG/DL (ref 8.6–10.4)
CHLORIDE BLD-SCNC: 108 MMOL/L (ref 98–107)
CO2: 23 MMOL/L (ref 20–31)
CREAT SERPL-MCNC: 1 MG/DL (ref 0.7–1.2)
CULTURE: NO GROWTH
EKG ATRIAL RATE: 111 BPM
EKG P AXIS: 62 DEGREES
EKG P-R INTERVAL: 204 MS
EKG Q-T INTERVAL: 320 MS
EKG QRS DURATION: 88 MS
EKG QTC CALCULATION (BAZETT): 435 MS
EKG R AXIS: -30 DEGREES
EKG T AXIS: 9 DEGREES
EKG VENTRICULAR RATE: 111 BPM
GFR AFRICAN AMERICAN: >60 ML/MIN
GFR NON-AFRICAN AMERICAN: >60 ML/MIN
GFR SERPL CREATININE-BSD FRML MDRD: ABNORMAL ML/MIN/{1.73_M2}
GFR SERPL CREATININE-BSD FRML MDRD: ABNORMAL ML/MIN/{1.73_M2}
GLUCOSE BLD-MCNC: 103 MG/DL (ref 75–110)
GLUCOSE BLD-MCNC: 116 MG/DL (ref 70–99)
GLUCOSE BLD-MCNC: 123 MG/DL (ref 75–110)
GLUCOSE BLD-MCNC: 132 MG/DL (ref 75–110)
HCT VFR BLD CALC: 38.3 % (ref 41–53)
HEMOGLOBIN: 12.9 G/DL (ref 13.5–17.5)
Lab: NORMAL
MCH RBC QN AUTO: 29.2 PG (ref 26–34)
MCHC RBC AUTO-ENTMCNC: 33.7 G/DL (ref 31–37)
MCV RBC AUTO: 86.7 FL (ref 80–100)
NRBC AUTOMATED: ABNORMAL PER 100 WBC
PDW BLD-RTO: 13.9 % (ref 11.5–14.9)
PLATELET # BLD: 194 K/UL (ref 150–450)
PMV BLD AUTO: 8.7 FL (ref 6–12)
POTASSIUM SERPL-SCNC: 4.4 MMOL/L (ref 3.7–5.3)
RBC # BLD: 4.42 M/UL (ref 4.5–5.9)
SODIUM BLD-SCNC: 140 MMOL/L (ref 135–144)
SPECIMEN DESCRIPTION: NORMAL
WBC # BLD: 18.4 K/UL (ref 3.5–11)

## 2021-12-27 PROCEDURE — 99223 1ST HOSP IP/OBS HIGH 75: CPT | Performed by: INTERNAL MEDICINE

## 2021-12-27 PROCEDURE — 97530 THERAPEUTIC ACTIVITIES: CPT

## 2021-12-27 PROCEDURE — 94640 AIRWAY INHALATION TREATMENT: CPT

## 2021-12-27 PROCEDURE — 97110 THERAPEUTIC EXERCISES: CPT

## 2021-12-27 PROCEDURE — 6360000002 HC RX W HCPCS: Performed by: INTERNAL MEDICINE

## 2021-12-27 PROCEDURE — 6360000002 HC RX W HCPCS: Performed by: PHYSICIAN ASSISTANT

## 2021-12-27 PROCEDURE — 99239 HOSP IP/OBS DSCHRG MGMT >30: CPT | Performed by: INTERNAL MEDICINE

## 2021-12-27 PROCEDURE — 80048 BASIC METABOLIC PNL TOTAL CA: CPT

## 2021-12-27 PROCEDURE — 6370000000 HC RX 637 (ALT 250 FOR IP): Performed by: PHYSICIAN ASSISTANT

## 2021-12-27 PROCEDURE — 6370000000 HC RX 637 (ALT 250 FOR IP): Performed by: INTERNAL MEDICINE

## 2021-12-27 PROCEDURE — 2580000003 HC RX 258: Performed by: PHYSICIAN ASSISTANT

## 2021-12-27 PROCEDURE — 85027 COMPLETE CBC AUTOMATED: CPT

## 2021-12-27 PROCEDURE — 97162 PT EVAL MOD COMPLEX 30 MIN: CPT

## 2021-12-27 PROCEDURE — 94761 N-INVAS EAR/PLS OXIMETRY MLT: CPT

## 2021-12-27 PROCEDURE — 76770 US EXAM ABDO BACK WALL COMP: CPT

## 2021-12-27 PROCEDURE — APPNB30 APP NON BILLABLE TIME 0-30 MINS: Performed by: NURSE PRACTITIONER

## 2021-12-27 PROCEDURE — 36415 COLL VENOUS BLD VENIPUNCTURE: CPT

## 2021-12-27 PROCEDURE — C9113 INJ PANTOPRAZOLE SODIUM, VIA: HCPCS | Performed by: PHYSICIAN ASSISTANT

## 2021-12-27 RX ADMIN — ATORVASTATIN CALCIUM 20 MG: 20 TABLET, FILM COATED ORAL at 08:32

## 2021-12-27 RX ADMIN — DEXAMETHASONE SODIUM PHOSPHATE 6 MG: 4 INJECTION, SOLUTION INTRA-ARTICULAR; INTRALESIONAL; INTRAMUSCULAR; INTRAVENOUS; SOFT TISSUE at 08:32

## 2021-12-27 RX ADMIN — ACETAMINOPHEN 650 MG: 325 TABLET, FILM COATED ORAL at 14:16

## 2021-12-27 RX ADMIN — PANTOPRAZOLE SODIUM 40 MG: 40 INJECTION, POWDER, FOR SOLUTION INTRAVENOUS at 08:33

## 2021-12-27 RX ADMIN — SODIUM CHLORIDE, PRESERVATIVE FREE 10 ML: 5 INJECTION INTRAVENOUS at 08:33

## 2021-12-27 RX ADMIN — ACETAMINOPHEN 650 MG: 325 TABLET, FILM COATED ORAL at 04:54

## 2021-12-27 RX ADMIN — GABAPENTIN 300 MG: 300 CAPSULE ORAL at 08:32

## 2021-12-27 RX ADMIN — BUDESONIDE AND FORMOTEROL FUMARATE DIHYDRATE 2 PUFF: 160; 4.5 AEROSOL RESPIRATORY (INHALATION) at 09:14

## 2021-12-27 ASSESSMENT — PAIN DESCRIPTION - ORIENTATION: ORIENTATION: RIGHT;LEFT

## 2021-12-27 ASSESSMENT — PAIN DESCRIPTION - LOCATION: LOCATION: LEG

## 2021-12-27 ASSESSMENT — PAIN SCALES - GENERAL
PAINLEVEL_OUTOF10: 4
PAINLEVEL_OUTOF10: 6
PAINLEVEL_OUTOF10: 0
PAINLEVEL_OUTOF10: 7
PAINLEVEL_OUTOF10: 0
PAINLEVEL_OUTOF10: 2

## 2021-12-27 ASSESSMENT — PAIN DESCRIPTION - PAIN TYPE
TYPE: CHRONIC PAIN
TYPE: CHRONIC PAIN

## 2021-12-27 NOTE — PROGRESS NOTES
Patient complained about his watch and necklace missing but writer located it with his belongings. Patient slept well through the night with no distress noted.

## 2021-12-27 NOTE — PLAN OF CARE
Problem: Falls - Risk of:  Goal: Will remain free from falls  Description: Will remain free from falls  12/27/2021 1438 by Addy Romeo RN  Outcome: Completed  12/27/2021 0323 by Jude Macedo RN  Outcome: Ongoing  Goal: Absence of physical injury  Description: Absence of physical injury  12/27/2021 1438 by Addy Romeo RN  Outcome: Completed  12/27/2021 0323 by Jude Macedo RN  Outcome: Ongoing  Note: No falls noted this shift. Patient ambulates with x1 staff assistance without difficulty. Bed kept in low position. Safe environment maintained. Bedside table & call light in reach. Uses call light appropriately when needing assistance. Problem: Airway Clearance - Ineffective  Goal: Achieve or maintain patent airway  12/27/2021 1438 by Addy Romeo RN  Outcome: Completed  12/27/2021 0323 by Jude Macedo RN  Outcome: Ongoing  Note: Patient remains afebrile;  no signs of erythema, edema, or warmth. Will continue to monitor for signs/symptoms of infection. Problem: Gas Exchange - Impaired  Goal: Absence of hypoxia  12/27/2021 1438 by Addy Romeo RN  Outcome: Completed  12/27/2021 0323 by Jude Macedo RN  Outcome: Ongoing  Goal: Promote optimal lung function  12/27/2021 1438 by Addy Romeo RN  Outcome: Completed  12/27/2021 0323 by Jude Macedo RN  Outcome: Ongoing     Problem: Breathing Pattern - Ineffective  Goal: Ability to achieve and maintain a regular respiratory rate  12/27/2021 1438 by Addy Romeo RN  Outcome: Completed  12/27/2021 0323 by Jude Macedo RN  Outcome: Ongoing     Problem:  Body Temperature -  Risk of, Imbalanced  Goal: Ability to maintain a body temperature within defined limits  12/27/2021 1438 by Addy Romeo RN  Outcome: Completed  12/27/2021 0323 by Jude Macedo RN  Outcome: Ongoing  Goal: Will regain or maintain usual level of consciousness  12/27/2021 1438 by Addy Romeo RN  Outcome: Completed  12/27/2021 1593 by Gayathri Ha RN  Outcome: Ongoing  Goal: Complications related to the disease process, condition or treatment will be avoided or minimized  12/27/2021 1438 by Jaime Fischer RN  Outcome: Completed  12/27/2021 0323 by Gayathri Ha RN  Outcome: Ongoing     Problem: Isolation Precautions - Risk of Spread of Infection  Goal: Prevent transmission of infection  12/27/2021 1438 by Jaime Fischer RN  Outcome: Completed  12/27/2021 0323 by Gayathri Ha RN  Outcome: Ongoing     Problem: Nutrition Deficits  Goal: Optimize nutritional status  12/27/2021 1438 by Jaime Fischer RN  Outcome: Completed  12/27/2021 0323 by Gayathri Ha RN  Outcome: Ongoing     Problem: Risk for Fluid Volume Deficit  Goal: Maintain normal heart rhythm  12/27/2021 1438 by Jaime Fischer RN  Outcome: Completed  12/27/2021 0323 by Gayathri Ha RN  Outcome: Ongoing  Goal: Maintain absence of muscle cramping  12/27/2021 1438 by Jaime Fischer RN  Outcome: Completed  12/27/2021 0323 by Gayathri Ha RN  Outcome: Ongoing  Goal: Maintain normal serum potassium, sodium, calcium, phosphorus, and pH  12/27/2021 1438 by Jaime Fischer RN  Outcome: Completed  12/27/2021 0323 by Gayathri Ha RN  Outcome: Ongoing     Problem: Loneliness or Risk for Loneliness  Goal: Demonstrate positive use of time alone when socialization is not possible  12/27/2021 1438 by Jaime Fischer RN  Outcome: Completed  12/27/2021 0323 by Gayathri Ha RN  Outcome: Ongoing     Problem: Fatigue  Goal: Verbalize increase energy and improved vitality  12/27/2021 1438 by Jaime Fischer RN  Outcome: Completed  12/27/2021 0323 by Gayathri Ha RN  Outcome: Ongoing     Problem: Patient Education: Go to Patient Education Activity  Goal: Patient/Family Education  12/27/2021 1438 by Jaime Fischer RN  Outcome: Completed  12/27/2021 0323 by Gayathri Ha RN  Outcome: Ongoing     Problem: Pain:  Goal: Pain level will decrease  Description: Pain level will decrease  12/27/2021 1438 by Thor eLma RN  Outcome: Completed  12/27/2021 0323 by Tj Khalil RN  Outcome: Ongoing  Goal: Control of acute pain  Description: Control of acute pain  12/27/2021 1438 by Thor Lema RN  Outcome: Completed  12/27/2021 0323 by Tj Khalil RN  Outcome: Ongoing  Note: No pain at this time. 0/10 pain scale. Goal: Control of chronic pain  Description: Control of chronic pain  12/27/2021 1438 by Thor Lema RN  Outcome: Completed  12/27/2021 0323 by Tj Khalil RN  Outcome: Ongoing     Problem: Skin Integrity:  Goal: Will show no infection signs and symptoms  Description: Will show no infection signs and symptoms  12/27/2021 1438 by Thor Lema RN  Outcome: Completed  12/27/2021 0323 by Tj Khalil RN  Outcome: Ongoing  Goal: Absence of new skin breakdown  Description: Absence of new skin breakdown  12/27/2021 1438 by Thor Lema RN  Outcome: Completed  12/27/2021 0323 by Tj Khalil RN  Outcome: Ongoing  Note: Skin assessment complete. Waffle mattress in place. Coccyx reddened. Sensicare applied PRN. Turned and repositioned every two hours. Area kept free from moisture. Proper nourishment and fluids encouraged, as appropriate. Will continue to monitor for additional needs and changes in skin breakdown.

## 2021-12-27 NOTE — PROGRESS NOTES
[address] [facility]    INPATIENT OCCUPATIONAL THERAPY  PROGRESS NOTE  Date: 2021  Patient Name: Inessa Mishra      Room: /-41  MRN: [id number]    : 1940  (80 y.o.) Gender: male     Discharge Recommendations:  Further Occupational Therapy is recommended upon facility discharge. Referring Practitioner: Mariana Linder MD  Diagnosis: Hyperkalemia; COVID-19  General  Chart Reviewed: Yes  Patient assessed for rehabilitation services?: Yes  Family / Caregiver Present: No  Referring Practitioner: Mariana Linder MD  Diagnosis: Hyperkalemia; COVID-19    Restrictions  Restrictions/Precautions: Isolation,Fall Risk (COVID+)  Implants present? : Metal implants ( B MAHOGANY,. B TKA)  Required Braces or Orthoses?: No      Subjective  Subjective: \"Tired\"  Comments: Ok per Scott for OT tx. Patient Currently in Pain: No  Overall Orientation Status: Within Functional Limits  Patient Observation  Observations: SPO2 briefly dropping to 88%a and rising to 93% after ~1 min       Objective        Balance  Sitting Balance: Supervision  Standing Balance: Stand by assistance  Standing Balance  Time: 3 min  Activity: static stand  Comment: with RW. Pt declines second stand due to fatigue. ADL  Feeding: Setup  Grooming: Setup  UE Bathing: Minimal assistance  LE Bathing: Maximum assistance  UE Dressing: Minimal assistance  LE Dressing: Maximum assistance  Toileting: Maximum assistance  Additional Comments: ADL scores based on clinical reasoning and skilled observation unless otherwise noted. Pt currently limited due to decreased strength, balance, activity tolerance impacting safety and independence with self care tasks.       Transfers  Sit to stand: Stand by assistance  Stand to sit: Stand by assistance  Transfer Comments: Verbal cues for hand placement and safety  Type of ROM/Therapeutic Exercise  Type of ROM/Therapeutic Exercise: AROM  Comment: Pt instruction in BUE exercises for facilitation of increased strnegth and endurance. Pt complete 8-10 reps each with good tolerance and reports he can't raise RUE above shoulder due to poor ROM. Exercises  Scapular Protraction: x  Scapular Retraction: x  Shoulder Depression: x  Shoulder Elevation: x  Elbow Flexion: x  Elbow Extension: x                          Assessment  Performance deficits / Impairments: Decreased ADL status; Decreased functional mobility ; Decreased strength;Decreased safe awareness;Decreased endurance;Decreased balance;Decreased high-level IADLs  Prognosis: Good  Discharge Recommendations: Patient would benefit from continued therapy after discharge  Activity Tolerance: Patient Tolerated treatment well  Safety Devices in place: Yes  Type of devices: All fall risk precautions in place; Bed alarm in place;Call light within reach;Gait belt;Patient at risk for falls; Left in bed;Nurse notified             Patient Education: OT POC, safety and hand placements with sit<>stand transfers, BUE exercises to complete on own time for decreased edema in hands, importance of daily OOB and activity for optimal rehab, energy conservation and breathing techniques during functional tasks. Learner:patient  Method: explanation       Outcome: acknowledged understanding of      Plan  Safety Devices  Safety Devices in place: Yes  Type of devices:  All fall risk precautions in place,Bed alarm in place,Call light within reach,Gait belt,Patient at risk for falls,Left in bed,Nurse notified  Plan  Times per week: 3-5  Current Treatment Recommendations: Self-Care / ADL,Strengthening,Balance Training,Functional Mobility Training,Endurance Training,Safety Education & Training,Patient/Caregiver Education & Training,Equipment Evaluation, Education, & procurement,Home Management Training      Goals  Short term goals  Time Frame for Short term goals: By discharge  Short term goal 1: Pt will complete lower body dressing/bathing with min A and good safety with

## 2021-12-27 NOTE — PLAN OF CARE
PRE CONSULT ROUNDING NOTE  HPI  80year old male with pmh of asthma arthritis htn hyperlipidemia who presented to the ED s/p fall. Our service is consulted for hematochezia. He is positive for covid and is being treated for sepsis secondary to covid pneumonia, DANIELLE metabolic encephalopathy. hpi is per electronic record and primary rn. According to the notes he developed hematochezia after a heparin drip was started. Unknown if he has had prior gi bleeding or scopes. The heparin drip was stopped but he had bloody BM again yesterday. Per the RN he had a normal loose BM overnight. he did have generalized abdominal pain on admission that is now resolved, with diarrhea. Per the RN the pt is stating that \"it always feels like I have to have diarrhea'. The stool testing was negative for c diff. CT abdomen did not show acute findings. hgb 12. 9. he is not on anticoagulation meds at home per the electronic record. Wbc 18.4 ferritin 1431 alt 47 ast 73    Endoscopy unknown  Family reports unknown  Social quit smoking admitted to one  etoh drink per week or illicit drugs   BP (!) 140/73   Pulse 67   Temp 97.4 °F (36.3 °C) (Oral)   Resp 18   Ht 5' 4\" (1.626 m)   Wt 174 lb 6.1 oz (79.1 kg)   SpO2 93%   BMI 29.93 kg/m²     ROS not completed pt in isolation   meds labs imaging and past medical records were reviewed    Exam  .. Due to the current efforts to prevent transmission of COVID-19 and also the need to preserve PPE for other caregivers, a face-to-face encounter with the patient was not performed. That being said, all relevant records and diagnostic tests were reviewed, including laboratory results and imaging. Please reference any relevant documentation elsewhere. Care will be coordinated with the primary service.       Assessment  Anemia with hematochezia  covid 19 pneumonia;sepsis; improved  Elevated lft  S/p fall  danielle improved  Metabolic encephalopathy improved    Plan  Will d/w md, hgb currently stable and the pt might be discharged today, he will need endoscopy as outpt unless the bleeding becomes increased while admitted  Trend hh and keep hgb >7  Continue ppi  RN to ask pt if he has had endoscopy in the past  Formal gi consult to follow  . Prema Sheppard, APRN - CNP

## 2021-12-27 NOTE — PROGRESS NOTES
Physical Therapy    Facility/Department: [facility] SURGE OVERFLOW  Initial Assessment    NAME: Genia Patel  : 1940  MRN: [id number]    Date of Service: 2021    Discharge Recommendations:  Patient would benefit from continued therapy after discharge   PT Equipment Recommendations  Equipment Needed: Yes  Mobility Devices: Sharlet Ganser: Rolling    Assessment   Body structures, Functions, Activity limitations: Decreased functional mobility ; Decreased ADL status; Decreased strength;Decreased safe awareness;Decreased endurance;Decreased balance  Assessment: pt requires SBA for bed mobility; CGA for transfers and gait using single point cane; pt demos unsteadiness with Cane and will benefit from RW to improve safety. SpO2 is maintained in Low 90's with light activity; pt demonstrates some confusion throughout session; pt will benefit from Continued PT services to improve deficits in balance, strength, endurance, activity tolerance, safety awareness to improve overalll safety with functional mobility and progress pt towards prior level of independence  Treatment Diagnosis: Impaired functional mobility and balance 2* Hyperkalemia, COVID 19 infection, and acute kidney injury  Specific instructions for Next Treatment: Progress ambulation distance with RW, Balance training with RW (Use RW vs cane)  Prognosis: Good  Decision Making: Medium Complexity  History: H/O arthritis, asthma, HTN, HLD. Exam: ROM, MMT, Balance, and functional mobility assessments  Clinical Presentation: pt with mild confusion but is alert and cooperative with treatment  Barriers to Learning: [place]  REQUIRES PT FOLLOW UP: Yes  Activity Tolerance  Activity Tolerance: Patient limited by endurance; Patient limited by cognitive status  Activity Tolerance: SpO2 remains in low 90's with activity; pt confused about the purpose of therapy presence; Patient Diagnosis(es): The encounter diagnosis was Hyperkalemia.      has a past medical history of Arthritis, Asthma, Hyperlipemia, and Hypertension. has a past surgical history that includes Total knee arthroplasty. Restrictions  Restrictions/Precautions  Restrictions/Precautions: Isolation,Fall Risk (COVID+)  Required Braces or Orthoses?: No  Implants present? : Metal implants ( B MAHOGANY,. B TKA)  Vision/Hearing  Vision: Impaired  Vision Exceptions: Wears glasses for reading  Hearing: Exceptions to [facility]  Hearing Exceptions: Hard of hearing/hearing concerns;Right hearing aid (Does not have in room)     Subjective  General  Chart Reviewed: Yes  Patient assessed for rehabilitation services?: Yes  Additional Pertinent Hx: Pt is an 80 y.o. male who presents to [facility] after having fallen at home; pt was found in the bathroom by wife; The fall was unwittnessed but pt did have trauma to head and knees; pt was alert but confused and was Diagnosed with COVID 19 7 days prior to the event. Pt was hypoxic, tachycardic, and febrile on arrival. CT of Head and neck were negative for acute process and fracture. Response To Previous Treatment: Not applicable  Family / Caregiver Present: No  Referring Practitioner: Dr. Larue Shone  Referral Date : 12/24/21  Diagnosis: Hyperkalemia, Acute Kidney Injury  Follows Commands: Impaired  Other (Comment): Pt able to follow 1 step commands; has difficulty with multiple steps; mild confusion  General Comment  Comments: OK per nurse Kee Montoya to proceed with PT eval  Subjective  Subjective: pt reports that he is going home this date; \"My wife is coming to pick me up, I'm not sure why they sent you in here\"  Pain Screening  Patient Currently in Pain: No  Pain Assessment  Pain Assessment: 0-10  Pain Level: 7  Pain Type: Chronic pain  Pain Location: Leg  Pain Orientation: Right;Left  Non-Pharmaceutical Pain Intervention(s): Ambulation/Increased Activity;Repositioned; Rest  Response to Pain Intervention: Patient Satisfied  Vital Signs  BP Location: Left upper arm  Level of Consciousness: Alert (0)  Patient Currently in Pain: Yes  Oxygen Therapy  SpO2: 93 %  Pulse Oximeter Device Mode: Continuous  Pulse Oximeter Device Location: Finger  O2 Device: None (Room air)  Patient Observation  Observations: pt resting in bed upon arrival; Ends session up in chair with chair alarm activated       Orientation  Orientation  Overall Orientation Status: Impaired  Orientation Level: Oriented to time;Oriented to person;Disoriented to situation  Social/Functional History  Social/Functional History  Lives With: Spouse  Type of Home: House  Home Layout: One level  Home Access: Ramped entrance  Bathroom Shower/Tub: Tub/Shower unit,Curtain  Bathroom Toilet: Handicap height  Home Equipment: Cane,Rolling walker,Wheelchair-manual  ADL Assistance: Independent  Homemaking Assistance: Independent  Homemaking Responsibilities: Yes  Ambulation Assistance: Independent (with cane)  Transfer Assistance: Independent  Active : Yes  Mode of Transportation: SUV  Additional Comments: Pt reports that daughter is able to assist as needed  Cognition        Objective          AROM RLE (degrees)  RLE AROM: WFL  AROM LLE (degrees)  LLE AROM : WFL  AROM RUE (degrees)  RUE General AROM: See OT Assessments  AROM LUE (degrees)  LUE General AROM: See OT assessments  Strength RLE  Strength RLE: WFL  Comment: Grossly 4/5  Strength LLE  Strength LLE: WFL  Comment: Grossly 4/5  Strength RUE  Comment: See OT assessments  Strength LUE  Comment: See OT assessments     Sensation  Overall Sensation Status: WFL (Pt denies)  Bed mobility  Scooting: Stand by assistance  Transfers  Sit to Stand: Contact guard assistance  Stand to sit: Contact guard assistance  Bed to Chair: Contact guard assistance  Stand Pivot Transfers: Minimal Assistance  Comment: Single point cane used for transfers; VCs for hands placement.  Pt became confused with directions for stand pivot to bedside chair thus requiring Min A to guide hips and hand placement. Ambulation  Ambulation?: Yes  Ambulation 1  Surface: level tile  Device: Single point cane  Assistance: Contact guard assistance  Quality of Gait: unsteady; slow  Gait Deviations: Slow Isis; Increased RUKHSANA; Decreased step length;Decreased step height;Shuffles  Distance: 15'  Comments: pt appearing unsteady with single point cane; CGA provided for steadying throughout entire duration of gait; recommending RW use for further treatments. Stairs/Curb  Stairs?: No (pt has no stairs at home)     Balance  Posture: Fair  Sitting - Static: Fair;+  Sitting - Dynamic: Fair  Standing - Static: Fair;- (Cane)  Standing - Dynamic: Fair;- Ozella Erps)  Comments: Standing balance assessed with cane; pt is unsteady and will benefit from use of RW to improbe balance/stability; No LOB to report.   Other exercises  Other exercises?: Yes  Other exercises 1: Bed mobility x3 to complete Brief change and perianal hygeine care     Plan   Plan  Times per week: 3-4 treatments per week  Specific instructions for Next Treatment: Progress ambulation distance with RW, Balance training with RW (Use RW vs cane)  Current Treatment Recommendations: Strengthening,Balance Training,Functional Mobility Training,Transfer Training,Endurance Training,Gait Training,Home Exercise Program,Safety Education & Training  Safety Devices  Type of devices: Call light within reach,Chair alarm in place,Gait belt,Patient at risk for falls,Left in chair,Nurse notified (Nurse Ying Garcia)    G-Code       OutComes Score    AM-PAC Score     AM-PAC Inpatient Mobility without Stair Climbing Raw Score : 15 (12/27/21 1000)  AM-PAC Inpatient without Stair Climbing T-Scale Score : 43.03 (12/27/21 1000)  Mobility Inpatient CMS 0-100% Score: 47.43 (12/27/21 1000)  Mobility Inpatient without Stair CMS G-Code Modifier : CK (12/27/21 1000)       Goals  Short term goals  Time Frame for Short term goals: until d/c  Short term goal 1: pt to improve BLE strength by 1/2 manual muscle grade to imrove safety with mobility  Short term goal 2: pt to improve standing balance to fair using RW to improve safety  Short term goal 3: pt to ambulate 48' with RW and supervision   Short term goal 4: pt to tolerate at least 30 minutes of therapeutic exericse/activity to improve tolerance for therapy and daily activities  Patient Goals   Patient goals : to return home       Therapy Time   Individual Concurrent Group Co-treatment   Time In 1000         Time Out 1032         Minutes 32         Timed Code Treatment Minutes: 15 Minutes     Due to my current lifting Restrictions, a PTA performed functional mobility and provided appropriate levels of assistance with the patient under my direct supervision in order to best protect the safety of the patient and myself. Assist levels documented in this Evaluation accurately reflect what was provided by my physical therapy assistant during the encounter where I was presently observing and assessing the patient's functional level and needs.     Paul Wilkerson, PT

## 2021-12-27 NOTE — PLAN OF CARE
Case d/w dr Mariel Portillo signing off follow up as outpt once covid resolves . Gilberto Pinto, APRN - CNP

## 2021-12-27 NOTE — PROGRESS NOTES
[place] [id number] Internal Medicine    Progress Note    2021    10:31 AM    Name:   Fay Beltrant  MRN:     [id number]     Acct:      [de-identified]   Room:   [address] Day:  4  Admit Date:  2021  4:24 PM    PCP:   iGsela Garcia MD  Code Status:  Full Code    Subjective:     C/C:   Chief Complaint   Patient presents with   Hanna Hi         Interval History Status: Improving    HPI:     See H&P    Review of Systems:     Denies any shortness of breath or cough  Denies chest pain or palpitations  Denies abdominal pain, diarrhea vomiting  Denies any new numbness tremors or weakness. Medications: Allergies:  No Known Allergies    Current Meds:   Scheduled Meds:    dexamethasone  6 mg IntraVENous Daily    insulin lispro  0-6 Units SubCUTAneous TID WC    insulin lispro  0-3 Units SubCUTAneous Nightly    budesonide-formoterol  2 puff Inhalation BID    gabapentin  300 mg Oral BID    atorvastatin  20 mg Oral Daily    pantoprazole  40 mg IntraVENous Daily    And    sodium chloride (PF)  10 mL IntraVENous Daily     Continuous Infusions:    IV infusion builder 50 mL/hr at 21 1343    dextrose       PRN Meds: ondansetron, acetaminophen, sodium chloride flush, glucose, dextrose, glucagon (rDNA), dextrose    Data:     Past Medical History:   has a past medical history of Arthritis, Asthma, Hyperlipemia, and Hypertension. Social History:   reports that he quit smoking about 36 years ago. He has a 30.00 pack-year smoking history. He has never used smokeless tobacco. He reports current alcohol use of about 1.0 standard drink of alcohol per week. He reports that he does not use drugs. Family History: History reviewed. No pertinent family history.     Vitals:  /65   Pulse 55   Temp 98.3 °F (36.8 °C) (Oral)   Resp 18   Ht 5' 4\" (1.626 m)   Wt 174 lb 6.1 oz (79.1 kg)   SpO2 93%   BMI 29.93 kg/m²   Temp (24hrs), Av °F (36.7 °C), Min:97.4 °F (36.3 °C), Max:98.3 °F (36.8 °C)    Recent Labs     12/26/21  1101 12/26/21  1628 12/26/21  1951 12/27/21  0832   POCGLU 115* 127* 138* 103       I/O (24Hr): Intake/Output Summary (Last 24 hours) at 12/27/2021 1031  Last data filed at 12/27/2021 0526  Gross per 24 hour   Intake 1146.56 ml   Output 500 ml   Net 646.56 ml       Labs:    Lab Results   Component Value Date    WBC 18.4 (H) 12/27/2021    HGB 12.9 (L) 12/27/2021    HCT 38.3 (L) 12/27/2021    MCV 86.7 12/27/2021     12/27/2021     Lab Results   Component Value Date     12/27/2021    K 4.4 12/27/2021     12/27/2021    CO2 23 12/27/2021    BUN 37 12/27/2021    CREATININE 1.00 12/27/2021    GLUCOSE 116 12/27/2021    CALCIUM 8.1 12/27/2021          Lab Results   Component Value Date/Time    SPECIAL NOT REPORTED 12/25/2021 05:36 PM     Lab Results   Component Value Date/Time    CULTURE NO GROWTH 12/25/2021 05:36 PM         Radiology:    Recent data reviewed    Physical Examination:        General appearance:  alert, cooperative and no distress  Eyes: Anicteric sclera. Pupils are equally round and reactive to light. Extraocular movements are intact.   Lungs:  clear to auscultation bilaterally, normal effort, no wheeze or crackles  Heart:  regular rate and rhythm, no murmur  Abdomen:  soft, nontender, no masses     Extremities:  no edema, redness, tenderness in the calves  Skin:  no gross lesions, rashes, induration  Neuro:  Alert, , no new focal weakness  Assessment:        Primary Problem  <principal problem not specified>    Active Hospital Problems    Diagnosis Date Noted    Hematochezia [K92.1]     Anemia [D64.9]     DANIELLE (acute kidney injury) ([facility] [address] [N17.9] 12/23/2021    Essential hypertension [I10] 06/15/2021           DVT prophylaxis: Heparin infusion  Antibiotics:    Plan:        Sepsis secondary to COVID-19 pneumonia, suspected superimposed bacterial pneumonia-started on Decadron, Rocephin,  no growth on blood cultures

## 2021-12-27 NOTE — PLAN OF CARE
Problem: Falls - Risk of:  Goal: Will remain free from falls  Description: Will remain free from falls  12/27/2021 1438 by Alpesh Hoover RN  Outcome: Completed  12/27/2021 0323 by Get Bauman RN  Outcome: Ongoing  Goal: Absence of physical injury  Description: Absence of physical injury  12/27/2021 1438 by Alpesh Hoover RN  Outcome: Completed  12/27/2021 0323 by Get Bauman RN  Outcome: Ongoing  Note: No falls noted this shift. Patient ambulates with x1 staff assistance without difficulty. Bed kept in low position. Safe environment maintained. Bedside table & call light in reach. Uses call light appropriately when needing assistance. Problem: Airway Clearance - Ineffective  Goal: Achieve or maintain patent airway  12/27/2021 1438 by Alpesh Hoover RN  Outcome: Completed  12/27/2021 0323 by Get Bauman RN  Outcome: Ongoing  Note: Patient remains afebrile;  no signs of erythema, edema, or warmth. Will continue to monitor for signs/symptoms of infection. Problem: Gas Exchange - Impaired  Goal: Absence of hypoxia  12/27/2021 1438 by Alpesh Hoover RN  Outcome: Completed  12/27/2021 0323 by Get Bauman RN  Outcome: Ongoing  Goal: Promote optimal lung function  12/27/2021 1438 by Alpesh Hoover RN  Outcome: Completed  12/27/2021 0323 by Get Bauman RN  Outcome: Ongoing     Problem: Breathing Pattern - Ineffective  Goal: Ability to achieve and maintain a regular respiratory rate  12/27/2021 1438 by Alpesh Hoover RN  Outcome: Completed  12/27/2021 0323 by Get Bauman RN  Outcome: Ongoing     Problem:  Body Temperature -  Risk of, Imbalanced  Goal: Ability to maintain a body temperature within defined limits  12/27/2021 1438 by Alpesh Hoover RN  Outcome: Completed  12/27/2021 0323 by Get Bauman RN  Outcome: Ongoing  Goal: Will regain or maintain usual level of consciousness  12/27/2021 1438 by Alpesh Hoover RN  Outcome: Completed  12/27/2021 6690 by Tin Ace RN  Outcome: Ongoing  Goal: Complications related to the disease process, condition or treatment will be avoided or minimized  12/27/2021 1438 by Norvel Cheadle, RN  Outcome: Completed  12/27/2021 0323 by Tin Ace RN  Outcome: Ongoing     Problem: Isolation Precautions - Risk of Spread of Infection  Goal: Prevent transmission of infection  12/27/2021 1438 by Norvel Cheadle, RN  Outcome: Completed  12/27/2021 0323 by Tin Ace RN  Outcome: Ongoing     Problem: Nutrition Deficits  Goal: Optimize nutritional status  12/27/2021 1438 by Norvel Cheadle, RN  Outcome: Completed  12/27/2021 0323 by Tin Ace RN  Outcome: Ongoing     Problem: Risk for Fluid Volume Deficit  Goal: Maintain normal heart rhythm  12/27/2021 1438 by Norvel Cheadle, RN  Outcome: Completed  12/27/2021 0323 by Tin Ace RN  Outcome: Ongoing  Goal: Maintain absence of muscle cramping  12/27/2021 1438 by Norvel Cheadle, RN  Outcome: Completed  12/27/2021 0323 by Tin Ace RN  Outcome: Ongoing  Goal: Maintain normal serum potassium, sodium, calcium, phosphorus, and pH  12/27/2021 1438 by Norvel Cheadle, RN  Outcome: Completed  12/27/2021 0323 by Tin Ace RN  Outcome: Ongoing     Problem: Loneliness or Risk for Loneliness  Goal: Demonstrate positive use of time alone when socialization is not possible  12/27/2021 1438 by Norvel Cheadle, RN  Outcome: Completed  12/27/2021 0323 by Tin Ace RN  Outcome: Ongoing     Problem: Fatigue  Goal: Verbalize increase energy and improved vitality  12/27/2021 1438 by Norvel Cheadle, RN  Outcome: Completed  12/27/2021 0323 by Tin Ace RN  Outcome: Ongoing     Problem: Patient Education: Go to Patient Education Activity  Goal: Patient/Family Education  12/27/2021 1438 by Norvel Cheadle, RN  Outcome: Completed  12/27/2021 0323 by Tin Ace RN  Outcome: Ongoing     Problem: Pain:  Goal: Pain level will decrease  Description: Pain level will decrease  12/27/2021 1438 by Arminda Rosales RN  Outcome: Completed  12/27/2021 0323 by Amanda Jordan RN  Outcome: Ongoing  Goal: Control of acute pain  Description: Control of acute pain  12/27/2021 1438 by Arminda Rosales RN  Outcome: Completed  12/27/2021 0323 by Amanda Jordan RN  Outcome: Ongoing  Note: No pain at this time. 0/10 pain scale. Goal: Control of chronic pain  Description: Control of chronic pain  12/27/2021 1438 by Arminda Rosales RN  Outcome: Completed  12/27/2021 0323 by Amanda Jordan RN  Outcome: Ongoing     Problem: Skin Integrity:  Goal: Will show no infection signs and symptoms  Description: Will show no infection signs and symptoms  12/27/2021 1438 by Arminda Rosales RN  Outcome: Completed  12/27/2021 0323 by Amanda Jordan RN  Outcome: Ongoing  Goal: Absence of new skin breakdown  Description: Absence of new skin breakdown  12/27/2021 1438 by Arminda Rosales RN  Outcome: Completed  12/27/2021 0323 by Amanda Jordan RN  Outcome: Ongoing  Note: Skin assessment complete. Waffle mattress in place. Coccyx reddened. Sensicare applied PRN. Turned and repositioned every two hours. Area kept free from moisture. Proper nourishment and fluids encouraged, as appropriate. Will continue to monitor for additional needs and changes in skin breakdown. Problem: Falls - Risk of:  Goal: Will remain free from falls  Description: Will remain free from falls  12/27/2021 1438 by Arminda Rosales RN  Outcome: Completed  12/27/2021 0323 by Amanda Jordan RN  Outcome: Ongoing  Goal: Absence of physical injury  Description: Absence of physical injury  12/27/2021 1438 by Arminda Rosales RN  Outcome: Completed  12/27/2021 0323 by Amanda Jordan RN  Outcome: Ongoing  Note: No falls noted this shift. Patient ambulates with x1 staff assistance without difficulty. Bed kept in low position. Safe environment maintained.  Bedside table & call light in reach. Uses call light appropriately when needing assistance. Problem: Airway Clearance - Ineffective  Goal: Achieve or maintain patent airway  12/27/2021 1438 by Rupesh Linder RN  Outcome: Completed  12/27/2021 0323 by Cole Johnson RN  Outcome: Ongoing  Note: Patient remains afebrile;  no signs of erythema, edema, or warmth. Will continue to monitor for signs/symptoms of infection. Problem: Gas Exchange - Impaired  Goal: Absence of hypoxia  12/27/2021 1438 by Rupesh Linder RN  Outcome: Completed  12/27/2021 0323 by Cole Johnson RN  Outcome: Ongoing  Goal: Promote optimal lung function  12/27/2021 1438 by Rupesh Linder RN  Outcome: Completed  12/27/2021 0323 by Cole Johnson RN  Outcome: Ongoing     Problem: Breathing Pattern - Ineffective  Goal: Ability to achieve and maintain a regular respiratory rate  12/27/2021 1438 by Rupesh Linder RN  Outcome: Completed  12/27/2021 0323 by Cole Johnson RN  Outcome: Ongoing     Problem:  Body Temperature -  Risk of, Imbalanced  Goal: Ability to maintain a body temperature within defined limits  12/27/2021 1438 by Rupesh Linder RN  Outcome: Completed  12/27/2021 0323 by Cole Johnson RN  Outcome: Ongoing  Goal: Will regain or maintain usual level of consciousness  12/27/2021 1438 by Rupesh Linder RN  Outcome: Completed  12/27/2021 0323 by Cole Johnson RN  Outcome: Ongoing  Goal: Complications related to the disease process, condition or treatment will be avoided or minimized  12/27/2021 1438 by Rupesh Linder RN  Outcome: Completed  12/27/2021 0323 by Cole Johnson RN  Outcome: Ongoing     Problem: Isolation Precautions - Risk of Spread of Infection  Goal: Prevent transmission of infection  12/27/2021 1438 by Rupesh Linder RN  Outcome: Completed  12/27/2021 0323 by Cole Johnson RN  Outcome: Ongoing     Problem: Nutrition Deficits  Goal: Optimize nutritional status  12/27/2021 1438 by Mitchell Ellsworth Andrea Mackenzie RN  Outcome: Completed  12/27/2021 0323 by Ha Franz RN  Outcome: Ongoing     Problem: Risk for Fluid Volume Deficit  Goal: Maintain normal heart rhythm  12/27/2021 1438 by Florencio Bergeron RN  Outcome: Completed  12/27/2021 0323 by Ha Franz RN  Outcome: Ongoing  Goal: Maintain absence of muscle cramping  12/27/2021 1438 by Florencio Bergeron RN  Outcome: Completed  12/27/2021 0323 by Ha Franz RN  Outcome: Ongoing  Goal: Maintain normal serum potassium, sodium, calcium, phosphorus, and pH  12/27/2021 1438 by Florencio Bergeron RN  Outcome: Completed  12/27/2021 0323 by Ha Franz RN  Outcome: Ongoing     Problem: Loneliness or Risk for Loneliness  Goal: Demonstrate positive use of time alone when socialization is not possible  12/27/2021 1438 by Florencio Bergeron RN  Outcome: Completed  12/27/2021 0323 by Ha Franz RN  Outcome: Ongoing     Problem: Fatigue  Goal: Verbalize increase energy and improved vitality  12/27/2021 1438 by Florencio Bergeron RN  Outcome: Completed  12/27/2021 0323 by Ha Franz RN  Outcome: Ongoing     Problem: Patient Education: Go to Patient Education Activity  Goal: Patient/Family Education  12/27/2021 1438 by Florencio Bergeron RN  Outcome: Completed  12/27/2021 0323 by Ha Franz RN  Outcome: Ongoing     Problem: Pain:  Goal: Pain level will decrease  Description: Pain level will decrease  12/27/2021 1438 by Florencio Bergeron RN  Outcome: Completed  12/27/2021 0323 by Ha Franz RN  Outcome: Ongoing  Goal: Control of acute pain  Description: Control of acute pain  12/27/2021 1438 by Florencio Bergeron RN  Outcome: Completed  12/27/2021 0323 by Ha Franz RN  Outcome: Ongoing  Note: No pain at this time. 0/10 pain scale.     Goal: Control of chronic pain  Description: Control of chronic pain  12/27/2021 1438 by Florencio Bergeron RN  Outcome: Completed  12/27/2021 0323 by Ha Franz RN  Outcome: Ongoing Problem: Skin Integrity:  Goal: Will show no infection signs and symptoms  Description: Will show no infection signs and symptoms  12/27/2021 1438 by Vernette Opitz, RN  Outcome: Completed  12/27/2021 0323 by Sergei Chang RN  Outcome: Ongoing  Goal: Absence of new skin breakdown  Description: Absence of new skin breakdown  12/27/2021 1438 by Vernette Opitz, RN  Outcome: Completed  12/27/2021 0323 by Sergei Chang RN  Outcome: Ongoing  Note: Skin assessment complete. Waffle mattress in place. Coccyx reddened. Sensicare applied PRN. Turned and repositioned every two hours. Area kept free from moisture. Proper nourishment and fluids encouraged, as appropriate. Will continue to monitor for additional needs and changes in skin breakdown.

## 2021-12-27 NOTE — PLAN OF CARE
Problem: Falls - Risk of:  Goal: Absence of physical injury  Description: Absence of physical injury  Outcome: Ongoing  Note: No falls noted this shift. Patient ambulates with x1 staff assistance without difficulty. Bed kept in low position. Safe environment maintained. Bedside table & call light in reach. Uses call light appropriately when needing assistance. Problem: Airway Clearance - Ineffective  Goal: Achieve or maintain patent airway  Outcome: Ongoing  Note: Patient remains afebrile;  no signs of erythema, edema, or warmth. Will continue to monitor for signs/symptoms of infection. Problem: Skin Integrity:  Goal: Absence of new skin breakdown  Description: Absence of new skin breakdown  Outcome: Ongoing  Note: Skin assessment complete. Waffle mattress in place. Coccyx reddened. Sensicare applied PRN. Turned and repositioned every two hours. Area kept free from moisture. Proper nourishment and fluids encouraged, as appropriate. Will continue to monitor for additional needs and changes in skin breakdown.

## 2021-12-27 NOTE — PROGRESS NOTES
Department of Internal Medicine  Nephrology MD Ace   progress note    Reason for consultation: Management of acute kidney injury. Consulting physician: Analia Pabon MD.    History of present illness: This is a 80 y.o. male with a significant past medical history of  systemic hypertension, bronchial asthma and hyperlipidemia, who was admitted after falling in the bathroom at home. He had been diagnosed with COVID-19 pneumonia 1 week ago. EMS found patient confused and hypoxic and placed him on a c-collar. Monitor showed sinus tachycardia and he was also placed on oxygen via nasal cannula. He was admitted to [place] [address] respiratory isolation and started on heparin drip which was discontinued after a few hours when he was noted to have bloody stool. Laboratory studies at presentation were remarkable for elevated BUN/creatinine 61/1.72 mg/dL prompting nephrology consultation. Subjective/interval history. Patient seen from a distance due to COVID- isolation and lack of PPE. Patient is sitting comfortably in the chair not in acute distress patient is breathing on room air  Kidney function is improved serum creatinine improved to 1.0 mg/dL  Potassium controlled  Patient is tolerating oral food and liquid    Patient has no known allergies.     Past Medical History:   Diagnosis Date    Arthritis     Asthma     Hyperlipemia     Hypertension      Scheduled Meds:   dexamethasone  6 mg IntraVENous Daily    insulin lispro  0-6 Units SubCUTAneous TID WC    insulin lispro  0-3 Units SubCUTAneous Nightly    budesonide-formoterol  2 puff Inhalation BID    gabapentin  300 mg Oral BID    atorvastatin  20 mg Oral Daily    pantoprazole  40 mg IntraVENous Daily    And    sodium chloride (PF)  10 mL IntraVENous Daily     Continuous Infusions:   IV infusion builder 50 mL/hr at 12/26/21 1343    dextrose       PRN Meds:.ondansetron, acetaminophen, sodium chloride flush, glucose, dextrose, Year: Not on file    Number of Places Lived in the Last Year: Not on file    Unstable Housing in the Last Year: Not on file       Physical Exam:    VITALS:  /65   Pulse 55   Temp 98.3 °F (36.8 °C) (Oral)   Resp 18   Ht 5' 4\" (1.626 m)   Wt 174 lb 6.1 oz (79.1 kg)   SpO2 93%   BMI 29.93 kg/m²   24HR INTAKE/OUTPUT:      Intake/Output Summary (Last 24 hours) at 12/27/2021 1650  Last data filed at 12/27/2021 3073  Gross per 24 hour   Intake 1146.56 ml   Output 500 ml   Net 646.56 ml       Remainder of the examExcessive risk deferred due to  excessive risk conferred by bedside physical exam during a global SARS-Cov 2/COVID 19 pandemic, in a setting where local and national supplies of personal protective equipment are depleted. CBC:   Recent Labs     12/25/21  0458 12/26/21  0524 12/27/21  0516   WBC 18.7* 20.3* 18.4*   HGB 16.1 14.1 12.9*    169 194     BMP:    Recent Labs     12/25/21  0458 12/26/21  0524 12/27/21  0516    137 140   K 4.7 4.7 4.4   * 109* 108*   CO2 15* 19* 23   BUN 54* 40* 37*   CREATININE 1.28* 0.99 1.00   GLUCOSE 138* 127* 116*       Lab Results   Component Value Date    NITRU NEGATIVE 12/23/2021    COLORU Yellow 12/23/2021    PHUR 5.0 12/23/2021    WBCUA 0 TO 2 12/23/2021    RBCUA 2 TO 5 12/23/2021    MUCUS NOT REPORTED 12/23/2021    TRICHOMONAS NOT REPORTED 12/23/2021    YEAST NOT REPORTED 12/23/2021    BACTERIA MANY 12/23/2021    SPECGRAV 1.025 12/23/2021    LEUKOCYTESUR NEGATIVE 12/23/2021    UROBILINOGEN Normal 12/23/2021    BILIRUBINUR NEGATIVE 12/23/2021    GLUCOSEU NEGATIVE 12/23/2021    KETUA NEGATIVE 12/23/2021    AMORPHOUS 2+ 12/23/2021     IMPRESSION/RECOMMENDATIONS:      1. Acute kidney injury - most consistent with prerenal azotemia. Renal function is improved with hydration       2. Hypertension - blood pressure control is adequate. 3.  COVID-19 infection -  minimal oxygen requirements. Continue dexamethasone.     4.  Non-anion gap metabolic acidosis - consistent with renal tubular acidosis.   Improved    Plan  DC IV fluids  We will sign off and follow on as-needed basis        Yenny Means MD   Attending Nephrologist  12/27/2021 4:50 PM

## 2021-12-27 NOTE — CONSULTS
Gastroenterology Consult Note      Patient: Inessa Mishra  : 1940  Acct#:  [id number]     Date:  2021    Subjective:       History of Present Illness  Patient is a 80 y.o.  male admitted with Hyperkalemia [E87.5]  DANIELLE (acute kidney injury) ([facility] [N17.9] who is seen in consult for GI bleed  Patient is admitted with shortness of breath after a fall and found to have Covid, hyperkalemia, we are consulted because of questionable hematochezia he also have pneumonia and sepsis. Had acute kidney failure. Per notes the patient had hematochezia after the heparin drip was started but he is finished with the protocol. Denied any previous GI bleed, and currently has no blood no black stool no hematemesis. Yesterday he had a normal color bowel movement according to the nurse. He did have some generalized abdominal pain but that has resolved  The stool studies were negative for C. Difficile  And CT scan of the abdomen showed no acute finding  The hemoglobin stable at 12.9  White blood count was 18.4  ALT 47 AST 73    No previous endo        Past Medical History:   Diagnosis Date    Arthritis     Asthma     Hyperlipemia     Hypertension       Past Surgical History:   Procedure Laterality Date    TOTAL KNEE ARTHROPLASTY        Past Endoscopic History none     Admission Meds  No current facility-administered medications on file prior to encounter. Current Outpatient Medications on File Prior to Encounter   Medication Sig Dispense Refill    HYDROcodone-acetaminophen (NORCO)  MG per tablet Take 1 tablet by mouth every 6 hours as needed for Pain for up to 30 days. 120 tablet 0    gabapentin (NEURONTIN) 300 MG capsule Take 1 capsule by mouth 2 times daily for 180 days.  Intended supply: 90 days 180 capsule 1    lisinopril (PRINIVIL;ZESTRIL) 20 MG tablet Take 1 tablet by mouth daily 90 tablet 1    simvastatin (ZOCOR) 40 MG tablet Take 1 tablet by mouth nightly 90 tablet 1    albuterol 12/26/21  0524 12/27/21  0516   WBC 18.7* 20.3* 18.4*   HGB 16.1 14.1 12.9*   HCT 49.4 43.2 38.3*   MCV 88.6 88.6 86.7    169 194     Recent Labs     12/25/21  0458 12/26/21  0524 12/27/21  0516    137 140   K 4.7 4.7 4.4   * 109* 108*   CO2 15* 19* 23   BUN 54* 40* 37*   CREATININE 1.28* 0.99 1.00     No results for input(s): AST, ALT, ALB, BILIDIR, BILITOT, ALKPHOS in the last 72 hours. No results for input(s): LIPASE, AMYLASE in the last 72 hours. No results for input(s): PROTIME, INR in the last 72 hours. No results for input(s): PTT in the last 72 hours. No results for input(s): OCCULTBLD in the last 72 hours. CEA:  No results found for: CEA  Ca 125:  No results found for:   Ca 19-9:  No results found for:   Ca 15-3:  No results found for:   AFP:  No components found for: AFAFP  Beta HCG:  No components found for: BHCG  Neuron Specific Enolase:  No results found for: NSE  Imaging Studies:                           All appropriate imaging studies and reports reviewed: Yes                 Assessment:     Active Problems:    Essential hypertension    DANIELLE (acute kidney injury) ([facility]    Hematochezia    Anemia    Pneumonia due to COVID-19 virus  Resolved Problems:    * No resolved hospital problems. *    Anemia  Hemoglobin stable with no active bleeding at this point  Patient with Covid and sepsis  Slight elevation of the liver enzymes  Status post fall  Acute kidney failure    Recommendations:   Outpatient endoscopy  PPI  H&H monitoring                        Thank you for allowing me to participate in the care of your patient. Please feel free to contact me with any questions or concerns.      Carolin Waters MD

## 2021-12-27 NOTE — PROGRESS NOTES
Discussed with nursing staff. No signs of acute bleed at this time. Vital signs are stable. Tolerating diet. Sodium potassium creatinine all unremarkable. Hemoglobin 12.9. WBC count is improved. Patient is being discharged today per admitting physician. Outpatient follow-up with GI regarding elective colonoscopy. Follow-up with me as needed.

## 2021-12-27 NOTE — DISCHARGE INSTR - COC
Pain Level: 7  Last Weight:   Wt Readings from Last 1 Encounters:   12/27/21 174 lb 6.1 oz (79.1 kg)     Mental Status:  oriented and alert    IV Access:  - None    Nursing Mobility/ADLs:  Walking   Assisted  Transfer  Assisted  Bathing  Assisted  Dressing  Assisted  Toileting  Assisted  Feeding  Independent  Med Admin  Assisted  Med Delivery   whole    Safety Concerns: At Risk for Falls    Impairments/Disabilities:      None    Nutrition Therapy:  Current Nutrition Therapy:   - Oral Diet:  General    Routes of Feeding: Oral  Liquids: No Restrictions  Daily Fluid Restriction: no  Last Modified Barium Swallow with Video (Video Swallowing Test): not done    Treatments at the Time of Hospital Discharge:   Respiratory Treatments: see mar  Oxygen Therapy:  is not on home oxygen therapy. Ventilator:    - No ventilator support    Rehab Therapies: Physical Therapy and Occupational Therapy  Weight Bearing Status/Restrictions: No weight bearing restirctions  Other Medical Equipment (for information only, NOT a DME order):     Other Treatments: skilled nursing assessment, medication education and monitoring    Patient's personal belongings (please select all that are sent with patient):  None    RN SIGNATURE:  Electronically signed by Michael Arnett RN on 12/27/21 at 1:37 PM EST    CASE MANAGEMENT/SOCIAL WORK SECTION    Inpatient Status Date: 12/23/21    Readmission Risk Assessment Score:  Readmission Risk              Risk of Unplanned Readmission:  16           Discharging to Facility/ [facility] [address], [address] [place] [address]  P: [phone]  F: [phone]      Dialysis Facility (if applicable)   Name:  Address:  Dialysis Schedule:  Phone:  Fax:    / signature: Electronically signed by Michael Arnett RN on 12/27/21 at 1:37 PM EST    PHYSICIAN SECTION    Prognosis: Fair    Condition at Discharge: Stable    Rehab Potential (if transferring to Rehab): Fair    Recommended Labs or Other Treatments After Discharge: see above    Physician Certification: I certify the above information and transfer of Callie Flores  is necessary for the continuing treatment of the diagnosis listed and that he requires [address] for less 30 days.      Update Admission H&P: No change in H&P    PHYSICIAN SIGNATURE:  Electronically signed by Vanna Mello MD on 12/27/21 at 2:12 PM EST

## 2021-12-27 NOTE — CARE COORDINATION
Continuity of Care Form    Patient Name: Hortensia Henry   :  1940  MRN:  [id number]    Admit date:  2021  Discharge date:  21    Code Status Order: Full Code   Advance Directives:      Admitting Physician:  Grace Alford MD  PCP: Stew Ortiz MD    Discharging Nurse:   [address] Unit/Room#: /-21  Discharging Unit Phone Number: [phone]    Emergency Contact:   Extended Emergency Contact Information  Primary Emergency Contact: Ashlee Strong, [address] Phone: [phone]  Relation: Spouse    Past Surgical History:  Past Surgical History:   Procedure Laterality Date    TOTAL KNEE ARTHROPLASTY         Immunization History: There is no immunization history on file for this patient.     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    DANIELLE (acute kidney injury) ([facility] N17.9    Hematochezia K92.1    Anemia D64.9       Isolation/Infection:   Isolation            Droplet Plus          Patient Infection Status       Infection Onset Added Last Indicated Last Indicated By Review Planned Expiration Resolved Resolved By    COVID-19 21 COVID-19 & Influenza Combo 21      Resolved    C-diff Rule Out 21 Gastrointestinal Panel, Molecular (Ordered)   21 Preet BETH Zavala    GI panel   C-diff discontiuned    C-diff Rule Out 21 C DIFF TOXIN/ANTIGEN (Ordered)   21 Rule-Out Test Resulted    COVID-19 (Rule Out) 21 COVID-19 & Influenza Combo (Ordered)   21 Rule-Out Test Resulted            Nurse Assessment:  Last Vital Signs: /65   Pulse 55   Temp 98.3 °F (36.8 °C) (Oral)   Resp 18   Ht 5' 4\" (1.626 m)   Wt 174 lb 6.1 oz (79.1 kg)   SpO2 93%   BMI 29.93 kg/m²     Last documented pain score (0-10 scale): Pain Level: 7  Last Weight:   Wt Readings from Last 1 Encounters:   12/27/21 174 lb 6.1 oz (79.1 kg)     Mental Status:  oriented and alert    IV Access:  - None    Nursing Mobility/ADLs:  Walking   Assisted  Transfer  Assisted  Bathing  Assisted  Dressing  Assisted  Toileting  Assisted  Feeding  Independent  Med Admin  Assisted  Med Delivery   whole    Safety Concerns: At Risk for Falls    Impairments/Disabilities:      None    Nutrition Therapy:  Current Nutrition Therapy:   - Oral Diet:  General    Routes of Feeding: Oral  Liquids: No Restrictions  Daily Fluid Restriction: no  Last Modified Barium Swallow with Video (Video Swallowing Test): not done    Treatments at the Time of Hospital Discharge:   Respiratory Treatments: see mar  Oxygen Therapy:  is not on home oxygen therapy. Ventilator:    - No ventilator support    Rehab Therapies: Physical Therapy and Occupational Therapy  Weight Bearing Status/Restrictions: No weight bearing restirctions  Other Medical Equipment (for information only, NOT a DME order):     Other Treatments: skilled nursing assessment, medication education and monitoring    Patient's personal belongings (please select all that are sent with patient):  None    RN SIGNATURE:  Electronically signed by Filiberto Serrano RN on 12/27/21 at 1:37 PM EST    CASE MANAGEMENT/SOCIAL WORK SECTION    Inpatient Status Date: 12/23/21    Readmission Risk Assessment Score:  Readmission Risk              Risk of Unplanned Readmission:  16           Discharging to Facility/ Agency   · [facility]  · Stuart Diaz [address] [place] [address], [address] [place] [address]  · P: [phone]  · F: [phone]  ·     Dialysis Facility (if applicable)   · Name:  · Address:  · Dialysis Schedule:  · Phone:  · Fax:    / signature: Electronically signed by Filiberto Serrano RN on 12/27/21 at 1:37 PM EST    PHYSICIAN SECTION    Prognosis: Fair    Condition at Discharge: Stable    Rehab Potential (if transferring to Rehab): Fair    Recommended Labs or Other Treatments After Discharge: see above    Physician Certification: I certify the above information and transfer of Hortensia Henry  is necessary for the continuing treatment of the diagnosis listed and that he requires [address] for less 30 days. Update Admission H&P: No change in H&P    PHYSICIAN SIGNATURE:  Electronically signed by Ev Dhaliwal MD on 12/27/21 at 2:12 PM EST    Medication List      START taking these medications    START taking these medications   dexamethasone 6 MG tablet  Commonly known as: Decadron  Take 1 tablet by mouth daily (with breakfast) for 10 days     CONTINUE taking these medications    CONTINUE taking these medications   * albuterol 1.25 MG/3ML nebulizer solution  Commonly known as: ACCUNEB   * albuterol sulfate  (90 Base) MCG/ACT inhaler  Inhale 2 puffs into the lungs 4 times daily   fluticasone-salmeterol 250-50 MCG/DOSE Aepb  Commonly known as: ADVAIR  Inhale 1 puff into the lungs 2 times daily   gabapentin 300 MG capsule  Commonly known as: NEURONTIN  Take 1 capsule by mouth 2 times daily for 180 days. Intended supply: 90 days   HYDROcodone-acetaminophen  MG per tablet  Commonly known as: NORCO  Take 1 tablet by mouth every 6 hours as needed for Pain for up to 30 days. lisinopril 20 MG tablet  Commonly known as: PRINIVIL;ZESTRIL  Take 1 tablet by mouth daily   simvastatin 40 MG tablet  Commonly known as: ZOCOR  Take 1 tablet by mouth nightly    (very important)  * This list has 2 medication(s) that are the same as other medications prescribed for you. Read the directions carefully, and ask your doctor or other care provider to review them with you.      Where to Get Your Medications      These medications were sent to [facility], [address] [place] [address]  Phone: [phone]        dexamethasone 6 MG tablet

## 2021-12-27 NOTE — CARE COORDINATION
ONGOING DISCHARGE PLAN:    COVID POSITIVE. Afebrile, on RA. Spoke with patient regarding discharge plan over the phone, and patient confirms that plan is home. Discussed with patient that OT is recommending SNF. He states he would rather have therapy come out to his home. Told patient that writer would call wife and discuss this with her. Called wife, Stacie Juárez. Spoke with her and daughter. They would both like for patient to come home with VNS. Daughter states she will be there with patient and wife on discharge. Notified them that pt will likely be discharged soon, even possibly today. Active order for IV Decadron. D-Dimer 2.97 on 12/26. No AC d/t blood in BM. Hgb stable at 12.9. GI plans for OP endoscopy. Referral faxed to [facility] and notified Chata Jarvis from [facility] of this. She states she will check to see if they are able to accept Covid patients at this time and will get back with writer. Will continue to follow for additional discharge needs. Electronically signed by Nikita Bob RN on 12/27/2021 at 11:04 AM    Received call from Chata Jarvis from [facility] stating they are unable to accept for VNS. Referral faxed to [address] [place] [address] and notified Xenia Caro from [facility] of this. Xenia Caro will get back with writer on whether or not they are able to accept. Electronically signed by Nikita Bob RN on 12/27/2021 at 12:20 PM    Received call from Silver Cisneros at [address] [place] [address] stating pt has been accepted for VNS. Notified pt's wife and daughter of this. Electronically signed by Nikita Bob RN on 12/27/2021 at 1:35 PM    Faxed MYRANDA and d/c med list to [address] [place] [address]. Notified Savannah from [facility] of this.     Electronically signed by Nikita Bob RN on 12/27/2021 at 2:57 PM

## 2021-12-28 ENCOUNTER — TELEPHONE (OUTPATIENT)
Dept: GASTROENTEROLOGY | Age: 81
End: 2021-12-28

## 2021-12-28 ENCOUNTER — CARE COORDINATION (OUTPATIENT)
Dept: CASE MANAGEMENT | Age: 81
End: 2021-12-28

## 2021-12-28 DIAGNOSIS — I10 ESSENTIAL HYPERTENSION: Primary | ICD-10-CM

## 2021-12-28 DIAGNOSIS — Z13.220 SCREENING FOR HYPERLIPIDEMIA: ICD-10-CM

## 2021-12-28 LAB
CULTURE: NORMAL
CULTURE: NORMAL
Lab: NORMAL
Lab: NORMAL
SPECIMEN DESCRIPTION: NORMAL
SPECIMEN DESCRIPTION: NORMAL

## 2021-12-28 NOTE — TELEPHONE ENCOUNTER
----- Message from Jamin Andre RN sent at 12/28/2021 12:08 PM EST -----  Regarding: hospital follow up  Patient discharged from [facility] 12/27 with covid, hyperkalemia and hematochezia    He is in isolation through 12/30. His sister in law passed away this morning so leave a message if need be    Pt to be seen within 3 weeks, preferably sooner if possible.  AVS states he is to follow up with Dr Rashida Muñoz     Sincerely,  Jamin Andre RN Care Transition Nurse

## 2021-12-28 NOTE — CARE COORDINATION
[facility] Transitions Initial Follow Up Call- COVID risk follow up call      Call within 2 business days of discharge: Yes    Patient: Fay Covert Patient : 1940   MRN: [id number]  Reason for Admission: COVID-19 pneumonia, sepsis, hyperkalemia (7.2), fall, hypoxia (82%)  Discharge Date: 21 RARS: Readmission Risk Score: 10.1 ( )      Last Discharge [facility]       Complaint Diagnosis Description Type Department Provider    21 Fall Hyperkalemia ED to Hosp-Admission (Discharged) (ADMITTED) Katherine Kruger MD; [facility] Rota... Spoke with: pt daughter Mendel Abel (pt handed phone to her     Call to pt who hands the phone to his daughter, Mendel Abel  She states pt is doing good  States no signs of SOB, fever/ chills, nausea. States he is coughing but doesn't seem to be bothered by this  States oxygen levels remaining 96-97%. States understanding to keep above 90% or return to hospital if SOB or low oxygen levels  He has not needed albuterol inhaler or nebulizer since getting home. States he is drinking plenty of fluids but appetite is smaller- she is giving him protein shakes to supplement for now until his appetite comes back   States her mother's sister passed away this morning from a heart attack. Condolences offered   States they have not heard from home care- agreeable to writer calling to inquire about [address] through  (14 days from diagnosis). States she and her mom have remained in quarantine during this time- unable to test d/t no tests available in this area  States they haven't called to schedule with GI office yet (Raquel Ribeiro will send staff message so this can get scheduled)   Denies needs  Encouraged to call writer/ CTC or providers if questions or concerns- v/u     Transitions of Care Initial Call    Was this an external facility discharge?  No Discharge Facility: [facility]     Challenges to be reviewed by the provider   Additional needs identified to be addressed with provider: No  none             Method of communication with provider : none      Advance Care Planning:   Does patient have an Advance Directive: not on file; education provided. Was this a readmission? No  Patient stated reason for admission: covid pneumonia  Patients top risk factors for readmission: medical condition-covid pneumonia    Care Transition Nurse (CTN) contacted the family by telephone to perform post hospital discharge assessment. Verified name and  with family as identifiers. Provided introduction to self, and explanation of the CTN role. CTN reviewed discharge instructions, medical action plan and red flags with family who verbalized understanding. Family given an opportunity to ask questions and does not have any further questions or concerns at this time. Were discharge instructions available to patient? Yes. Reviewed appropriate site of care based on symptoms and resources available to patient including: PCP, Specialist, Home health, When to call 911 and ctn. The family agrees to contact the PCP office for questions related to their healthcare. Medication reconciliation was performed with family, who verbalizes understanding of administration of home medications. Advised obtaining a 90-day supply of all daily and as-needed medications. Covid Risk Education     Educated patient about risk for severe COVID-19 due to risk factors according to CDC guidelines. CTN reviewed discharge instructions, medical action plan and red flag symptoms with the family who verbalized understanding. Discussed COVID vaccination status: Yes and he has not been vaccinated. Education provided on COVID-19 vaccination as appropriate. Discussed exposure protocols and quarantine with CDC Guidelines. Family was given an opportunity to verbalize any questions and concerns and agrees to contact CTN or health care provider for questions related to their healthcare.     Reviewed and educated family on any new and changed medications related to discharge diagnosis. Was patient discharged with a pulse oximeter? No but they have one at home to monitor oxygen levels. Discussed and confirmed pulse oximeter discharge instructions and when to notify provider or seek emergency care. CTN provided contact information. Plan for follow-up call in 5-7 days based on severity of symptoms and risk factors. Plan for next call: symptom management-routine follow up   follow up appointment-GI appt           Non-face-to-face services provided:  Scheduled appointment with PCP-states they are just going to keep appt 1/11  Scheduled appointment with Specialist-agreeable to help from schedulers for GI office to call for appt   Obtained and reviewed discharge summary and/or continuity of care documents  Assessment and support for treatment adherence and medication management-confirms new med.  Declines to review all meds now     Care Transitions 24 Hour Call    Do you have any ongoing symptoms?: No  Do you have a copy of your discharge instructions?: Yes  Do you have all of your prescriptions and are they filled?: Yes  Have you been contacted by a [address]?: No  Have you scheduled your follow up appointment?: Yes  How are you going to get to your appointment?: Car - family or friend to transport  Were you discharged with any Home Care or Post Acute Services: Yes  Post Acute Services: Home Health (Comment: [facility] )  Do you feel like you have everything you need to keep you well at home?: Yes  Care Transitions Interventions         Follow Up  Future Appointments   Date Time Provider Anna Srivastava   1/11/2022 10:15 AM Tamra Oglesby MD [address] Kathleen RN

## 2021-12-29 RX ORDER — LISINOPRIL 20 MG/1
20 TABLET ORAL DAILY
Qty: 90 TABLET | Refills: 3 | Status: SHIPPED | OUTPATIENT
Start: 2021-12-29 | End: 2022-01-24 | Stop reason: ALTCHOICE

## 2021-12-29 RX ORDER — SIMVASTATIN 40 MG
TABLET ORAL
Qty: 90 TABLET | Refills: 3 | Status: SHIPPED | OUTPATIENT
Start: 2021-12-29

## 2022-01-05 ENCOUNTER — CARE COORDINATION (OUTPATIENT)
Dept: CASE MANAGEMENT | Age: 82
End: 2022-01-05

## 2022-01-05 NOTE — CARE COORDINATION
[facility] Transitions Follow Up Call- 1st attempt COVID risk follow up call      2022    Patient: Maren Ann  Patient : 1940   MRN: [id number]  Reason for Admission: COVID-19 pneumonia, sepsis, hyperkalemia (7.2), fall, hypoxia (82%)  Discharge Date: 21 RARS: Readmission Risk Score: 10.1 ( )         Attempted to reach patient for subsequent transitional call. VM left to return call to [phone]. Will attempt follow up at a later time.      Follow Up  Future Appointments   Date Time Provider Anna Srivastava   2022 10:15 AM Charley Lane MD [address]   2022  9:15 AM Anne Núñez MD [facility] ELISSA Olea Cap RN

## 2022-01-11 NOTE — DISCHARGE SUMMARY
[place] [address] Internal Medicine    Discharge Summary     Patient ID: Mic Shepherd  :  1940   MRN: [id number]     ACCOUNT:  [de-identified]   Patient's PCP: Wei Limon MD  Admit Date: 2021   Discharge Date: 2022    Length of Stay: 4  Code Status:  Prior  Admitting Physician: Dale Slater MD  Discharge Physician: Trina iHlliard MD     Active Discharge Diagnoses:     Primary Problem  <principal problem not specified>      [place] Problems    Diagnosis Date Noted    Hematochezia [K92.1]     Anemia [D64.9]     Pneumonia due to COVID-19 virus [U07.1, J12.82]     DANIELLE (acute kidney injury) ([facility] [address] [N17.9] 2021    Essential hypertension [I10] 06/15/2021       Admission Condition:  fair     Discharged Condition: fair    Hospital Stay:     Hospital Course:  Mic Shepherd is a 80 y.o. male who was admitted for the management of <principal problem not specified> , presented to ER with Fall  80year-old elderly gentleman who presented with a fall face down in the bathtub patient was in the restroom at that time presenting in ER patient was found to be hypoxic 82% on room air diagnosed with COVID-19 approximately  his   Patient was found to be tachypneic tachycardic and febrile trauma work-up was negative found to be in acute renal failure creatinine 1.72 and life-threatening hyperkalemia potassium 7.2  Severe hyperkalemia was managed medically and consultation with nephrologist  Patient is a former smoker quit 23 8630-pack-year history  Patient also found to have some bloody stools    Patient was treated with droplet isolation and Decadron and empiric antibiotics for possible superinfection pneumonia did not require remdesivir monoclonal antibodies as there was oxygen requirement was low CT scan of the chest was done which ruled out any bacterial pneumonia patient was unvaccinated for COVID-19 pneumonia  GI was consulted for the bloody stool hemoglobin was stable plan was for outpatient endoscopy  Echocardiogram was done which did not show any wall motion abnormality urine cultures were negative antibiotic discontinued patient discharged to [facility] follow-up outpatient with GI and PCP      Significant therapeutic interventions:     Significant Diagnostic Studies:   Labs / Micro:        ,     Radiology:    ECHO Complete 2D W Doppler W Color    Result Date: 12/24/2021  MENDY [address] [place] [address] Transthoracic Echocardiography Report (TTE)  Patient Name [address] [place] [address]      Date of Study               12/24/2021               CLEO MARIANO   Date of      1940  Gender                      Male  Birth   Age          80 year(s)  Race                           Room Number         Height:                     64 inch, 162.56 cm   Corporate ID [id number]    Weight:                     178 pounds, 80.7 kg  #   Patient Acct [de-identified]   BSA:          1.86 m^2      BMI:      30.55  #                                                              kg/m^2   MR #         [id number]      Sonographer                 Kika Baez   Accession #  [id number]  Interpreting Physician      [address] [place] [address]   Fellow                   Referring Nurse                           Practitioner   Interpreting             Referring Physician         [address] [place] [address]  Fellow  Additional Comments Technically difficult study. PT unable to position correctly. Type of Study   TTE procedure:2D Echocardiogram, M-Mode, Doppler, Color Doppler. Procedure Date Date: 12/24/2021 Start: 09:38 AM Study Location: [address] [place] [address] Technical Quality: Fair visualization Indications:Elevated troponin. Patient Status: Inpatient Height: 64 inches Weight: 178 pounds BSA: 1.86 m^2 BMI: 30.55 kg/m^2 Rhythm: Sinus tachycardia HR: 105 bpm BP: 112/99 mmHg CONCLUSIONS Summary Technically difficult study, not all walls well visualized. Left ventricle is normal in size and wall thickness.  Global left ventricular systolic function is normal. Estimated LV EF 55 %. No significant valvular regurgitation or stenosis seen. Anterior echo free space suggestive of adipose tissue. Signature ----------------------------------------------------------------------------  Electronically signed by Kkia Baez(Sonographer) on 2021 12:09  PM ---------------------------------------------------------------------------- ----------------------------------------------------------------------------  Electronically signed by Markus Butcher(Interpreting physician) on 2021  12:26 PM ---------------------------------------------------------------------------- FINDINGS Left Atrium Left atrium is normal in size. Left Ventricle Left ventricle is normal in size and wall thickness. Global left ventricular systolic function is normal. Estimated LV EF >55 %. Right Atrium Right atrium was not well visualized. Right Ventricle Difficult visualization of the right ventricle. TAPSE value suggests normal RV systolic function. Mitral Valve No obvious valvular abnormality seen. No evidence of mitral regurgitation. Aortic Valve No obvious valvular abnormality seen. No evidence of aortic insufficiency or stenosis. Tricuspid Valve Tricuspid valve was not well visualized. Insignificant tricuspid regurgitation, unable to estimate RVSP. Pulmonic Valve Pulmonic valve was not well visualized. No evidence of pulmonic insufficiency or stenosis. Pericardial Effusion Anterior echo free space suggestive of adipose tissue. Pleural Effusion No pleural effusion seen. Miscellaneous Normal aortic root dimension. E/E' average = 6.6.  M-mode / 2D Measurements & Calculations:   LVIDd:5.47 cm(3.7 - 5.6 cm)      Diastolic BJOQOV:250 ml  YLCIR:7.13 cm(2.2 - 4.0 cm)      Systolic RHXDBJ:94 ml  ONLA:2.77 cm(0.6 - 1.1 cm)       Aortic Root:3.4 cm(2.0 - 3.7 cm)  LVPWd:1.06 cm(0.6 - 1.1 cm)      LA Dimension: 3.7 cm(1.9 - 4.0 cm)  Fractional Shortenin.47 %    LA volume/Index: 25.4 ml /14m^2  Calculated LVEF (%): 73.17 %     LVOT:2 cm   Mitral:                              Aortic   Peak E-Wave: 0.62 m/s                Peak Velocity: 0.69 m/s  Peak A-Wave: 0.73 m/s                Mean Velocity: 0.43 m/s  E/A Ratio: 0.85                      Peak Gradient: 1.88 mmHg  Peak Gradient: 1.54 mmHg             Mean Gradient: 1 mmHg  Deceleration Time: 187 msec                                        Area (continuity): 4.85 cm^2                                       AV VTI: 9.26 cm  Septal Wall E' velocity:0.08 m/s Lateral Wall E' velocity:0.12 m/s    XR PELVIS (1-2 VIEWS)    Result Date: 12/23/2021  EXAMINATION: ONE XRAY VIEW OF THE PELVIS 12/23/2021 1:39 pm COMPARISON: None. HISTORY: ORDERING SYSTEM PROVIDED HISTORY: trauma, tachycardic, hypotensive TECHNOLOGIST PROVIDED HISTORY: trauma, tachycardic, hypotensive Reason for Exam: trauma, tachycardic, hypotensive FINDINGS: Bones appear demineralized. Ilioischial and iliopectineal lines are intact. SI joints and pubic symphysis are congruent. No acute soft tissue abnormalities are identified. No acute abnormality detected within the pelvis. CT Head WO Contrast    Result Date: 12/23/2021  EXAMINATION: CT OF THE HEAD WITHOUT CONTRAST  12/23/2021 4:47 pm TECHNIQUE: CT of the head was performed without the administration of intravenous contrast. Dose modulation, iterative reconstruction, and/or weight based adjustment of the mA/kV was utilized to reduce the radiation dose to as low as reasonably achievable. COMPARISON: None.  HISTORY: ORDERING SYSTEM PROVIDED HISTORY: trauma, fall, hypotensive, tachycardic, AMS TECHNOLOGIST PROVIDED HISTORY: trauma, fall, hypotensive, tachycardic, AMS Decision Support Exception - unselect if not a suspected or confirmed emergency medical condition->Emergency Medical Condition (MA) Reason for Exam: trauma, fall, hypotensive, tachycardic, AMS FINDINGS: CT OF THE BRAIN: BRAIN/VENTRICLES: No mass effect, edema or hemorrhage is seen. Mild-to-moderate volume loss is seen brain with mild microvascular ischemic changes. No hydrocephalus or extra-axial fluid is seen. The ORBITS: The visualized portion of the orbits demonstrate no acute abnormality. SINUSES: The visualized paranasal sinuses and mastoid air cells demonstrate no acute abnormality. SOFT TISSUES/SKULL: No acute abnormality of the visualized skull or soft tissues. CT CERVICAL SPINE: BONES/ALIGNMENT: There is no acute fracture or traumatic malalignment. Status post anterior cervical discectomy with fusion from C5-C7. DEGENERATIVE CHANGES: Minimal anterolisthesis of1 C7 over T1. Small disc bulges at C3-4 and C4-5 result in mild central canal stenoses. Multilevel neural foraminal stenoses, worst (moderate) at the left C3-4 level. SOFT TISSUES: There is no prevertebral soft tissue swelling. CT head without contrast: 1. No skull fracture or acute intracranial abnormality. CT cervical spine without contrast: 1. No fracture or joint dislocation is seen. 2. Degenerative changes, as described. RECOMMENDATIONS: Unavailable     CT Cervical Spine WO Contrast    Result Date: 12/23/2021  EXAMINATION: CT OF THE CERVICAL SPINE WITHOUT CONTRAST 12/23/2021 4:47 pm TECHNIQUE: CT of the cervical spine was performed without the administration of intravenous contrast. Multiplanar reformatted images are provided for review. Dose modulation, iterative reconstruction, and/or weight based adjustment of the mA/kV was utilized to reduce the radiation dose to as low as reasonably achievable. COMPARISON: None.  HISTORY: ORDERING SYSTEM PROVIDED HISTORY: trauma, fall, hypotensive, tachycardic, AMS TECHNOLOGIST PROVIDED HISTORY: trauma, fall, hypotensive, tachycardic, AMS Decision Support Exception - unselect if not a suspected or confirmed emergency medical condition->Emergency Medical Condition (MA) Reason for Exam: trauma, fall, hypotensive, tachycardic, AMS FINDINGS: BONES/ALIGNMENT: There is a minimal degenerative anterolisthesis of C7 on T1. Vertebral body alignment is otherwise normal.  Bones are diffusely demineralized. Cervical vertebral body heights are normal.  Facet joints are normally aligned. There is no acute fracture or traumatic malalignment. DEGENERATIVE CHANGES: Anterior fusion surgery from C5 through C7 with anterior plate and fixation screws at each level. There is good bone fusion of the disc spaces. There is mild degenerative disc disease and mild left-greater-than-right facet arthropathy at C3-C4 and C4-C5. SOFT TISSUES: There is no prevertebral soft tissue swelling. No acute fracture or traumatic malalignment of the cervical spine. Multilevel degenerative changes. Anterior fusion from C5 through C7. RECOMMENDATIONS: Unavailable     US RENAL COMPLETE    Result Date: 12/27/2021  EXAMINATION: RETROPERITONEAL ULTRASOUND OF THE KIDNEYS 12/27/2021 COMPARISON: CT chest, abdomen and pelvis December 23, 2021. HISTORY: ORDERING SYSTEM PROVIDED HISTORY: hematuria, DANIELLE TECHNOLOGIST PROVIDED HISTORY: hematuria, DANIELLE FINDINGS: Kidneys: The right kidney measures 10.6 cm in length and the left kidney measures 11.5 cm in length. No contour deforming mass, shadowing stone hydronephrosis. No acute findings. RECOMMENDATIONS: Unavailable     XR CHEST PORTABLE    Result Date: 12/23/2021  EXAMINATION: ONE XRAY VIEW OF THE CHEST 12/23/2021 4:39 pm COMPARISON: None. HISTORY: ORDERING SYSTEM PROVIDED HISTORY: trauma, hypoxic TECHNOLOGIST PROVIDED HISTORY: trauma, hypoxic Reason for Exam: Trauma, hypoxic FINDINGS: No acute airspace infiltrate. No pneumothorax or pleural effusion. Mild cardiomegaly. Atherosclerotic calcification in the aortic arch. Status post bilateral shoulder arthroplasty.      No acute cardiopulmonary disease     CT CHEST ABDOMEN PELVIS W CONTRAST    Result Date: 12/23/2021  EXAMINATION: CT OF THE CHEST, ABDOMEN, AND PELVIS WITH CONTRAST 12/23/2021 of diverticulitis. Otherwise, the duodenal sweep and the remainder of the small bowel are unremarkable. Mild diverticulosis of the large bowel is present without CT evidence of diverticulitis. Large bowel is otherwise unremarkable. The appendix is normal. Pelvis: Urinary bladder is decompressed. Mild to moderate prostatic hypertrophy. No free pelvic fluid. Peritoneum/Retroperitoneum: Moderate vascular calcifications within the abdominal aorta. The abdominal aorta is normal in caliber. Superior mesenteric artery is enhancing. Bones/Soft Tissues: Scattered small lucencies are identified within lumbar vertebral bodies as well as within the sacrum. No acute bony abnormality detected within the abdomen and pelvis. The extra-abdominal and extra pelvic soft tissues are unremarkable. No acute traumatic abnormality detected within the chest, abdomen, and pelvis. Mild diverticulosis of the large bowel is present without CT evidence of diverticulitis. Scattered small lucencies within the bony structures are seen diffusely. These may reflect advanced osteoporosis, but blood dyscrasias (such as multiple myeloma) and metastatic disease remain in the differential.         Consultations:    Consults:     Final Specialist Recommendations/Findings:   IP CONSULT TO INTERNAL MEDICINE  IP CONSULT TO TRAUMA SURGERY  IP CONSULT TO CARDIOLOGY  IP CONSULT TO PULMONOLOGY  IP CONSULT TO NEPHROLOGY  IP CONSULT TO GI      The patient was seen and examined on day of discharge and this discharge summary is in conjunction with any daily progress note from day of discharge. Discharge plan:     Disposition: Home    Physician Follow Up:     MD Terrance Chung [address]  [address] [place] [address]  [phone]    Schedule an appointment as soon as possible for a visit in 3 weeks  hematochezia      [facility]  [address] [facility]  [phone]           Requiring Further Evaluation/Follow Up POST HOSPITALIZATION/Incidental Findings:    Diet: cardiac diet    Activity: As tolerated    Instructions to Patient:     Discharge Medications:      Medication List      CONTINUE taking these medications    * albuterol 1.25 MG/3ML nebulizer solution  Commonly known as: ACCUNEB     * albuterol sulfate  (90 Base) MCG/ACT inhaler  Inhale 2 puffs into the lungs 4 times daily     fluticasone-salmeterol 250-50 MCG/DOSE Aepb  Commonly known as: ADVAIR  Inhale 1 puff into the lungs 2 times daily     gabapentin 300 MG capsule  Commonly known as: NEURONTIN  Take 1 capsule by mouth 2 times daily for 180 days. Intended supply: 90 days     HYDROcodone-acetaminophen  MG per tablet  Commonly known as: NORCO  Take 1 tablet by mouth every 6 hours as needed for Pain for up to 30 days. * This list has 2 medication(s) that are the same as other medications prescribed for you. Read the directions carefully, and ask your doctor or other care provider to review them with you. STOP taking these medications    lisinopril 20 MG tablet  Commonly known as: PRINIVIL;ZESTRIL     simvastatin 40 MG tablet  Commonly known as: ZOCOR        ASK your doctor about these medications    dexamethasone 6 MG tablet  Commonly known as: Decadron  Take 1 tablet by mouth daily (with breakfast) for 10 days  Ask about: Should I take this medication? Where to Get Your Medications      These medications were sent to [facility] [address] [facility], [address] [facility] [address]    Phone: [phone]   · dexamethasone 6 MG tablet         Time Spent on discharge is  35 mins in patient examination, evaluation, counseling as well as medication reconciliation, prescriptions for required medications, discharge plan and follow up.     Electronically signed by   Xander Mcconnell MD  1/11/2022  4:11 PM      Thank you Dr. Byron Sanchez MD for the opportunity to be involved in this patient's care.

## 2022-01-12 ENCOUNTER — TELEPHONE (OUTPATIENT)
Dept: INTERNAL MEDICINE CLINIC | Age: 82
End: 2022-01-12

## 2022-01-12 NOTE — TELEPHONE ENCOUNTER
She should take him to the hospital and then from there and he will be sent to the nursing home.  MAGALI

## 2022-01-12 NOTE — TELEPHONE ENCOUNTER
Spoke to patients wife and she said she doesn't want him to go to a nursing home and he wont go also.

## 2022-01-12 NOTE — TELEPHONE ENCOUNTER
Michelle George from physical therapy, she said that he is falling a lot, leg weakness, back spasms, back pain. His legs just give out when he is walking. She said you can call him with updates,     he has been calling 911 to help him get up. His wife cant pick him up.

## 2022-01-14 DIAGNOSIS — M47.12 OSTEOARTHRITIS OF CERVICAL SPINE WITH MYELOPATHY: ICD-10-CM

## 2022-01-14 DIAGNOSIS — M47.817 OSTEOARTHRITIS OF LUMBOSACRAL SPINE: ICD-10-CM

## 2022-01-17 ENCOUNTER — TELEPHONE (OUTPATIENT)
Dept: INTERNAL MEDICINE CLINIC | Age: 82
End: 2022-01-17

## 2022-01-17 DIAGNOSIS — J12.82 PNEUMONIA DUE TO COVID-19 VIRUS: Primary | ICD-10-CM

## 2022-01-17 DIAGNOSIS — U07.1 PNEUMONIA DUE TO COVID-19 VIRUS: Primary | ICD-10-CM

## 2022-01-17 RX ORDER — HYDROCODONE BITARTRATE AND ACETAMINOPHEN 10; 325 MG/1; MG/1
1 TABLET ORAL EVERY 6 HOURS PRN
Qty: 120 TABLET | Refills: 0 | Status: SHIPPED | OUTPATIENT
Start: 2022-01-17 | End: 2022-02-15 | Stop reason: SDUPTHER

## 2022-01-17 NOTE — TELEPHONE ENCOUNTER
Landry Ann from [facility] called in to see if the patients PCP can send over a prescription for cough medicine Musicex for the patient is having a cough. She said he has fine crackles at the top of his lungs.     Please advise

## 2022-01-17 NOTE — TELEPHONE ENCOUNTER
----- Message from [facility] sent at 1/14/2022  5:38 PM EST -----  Subject: Message to Provider    QUESTIONS  Information for Provider? Landry Ann from [facility] called in to see if   the patients PCP can send over a prescription for cough medicine Musicex   for the patient is having a cough. She said he has fine crackles at the   top of his lungs. The rest of his lungs are clear. They would like for the   medicine to be sent to  [facility] located at [address] [place] [address] [place],[address] telephone number is [phone]  ---------------------------------------------------------------------------  --------------  [address]  What is the best way for the office to contact you? OK to leave message on   voicemail  Preferred Call Back Phone Number? [phone]  ---------------------------------------------------------------------------  --------------  SCRIPT ANSWERS  Relationship to Patient? Third Party  Representative Name?  Landry Ann

## 2022-01-17 NOTE — TELEPHONE ENCOUNTER
----- Message from Yuri Ventura MA sent at 1/17/2022 12:19 PM EST -----  Subject: Message to Provider    QUESTIONS  Information for Provider? [address] [facility] called Friday regarding   patien's cough. Taking Roibitussin OTC, not working. Waiting on call back. Please call Migdalia Swenson  [phone] or the patient.   ---------------------------------------------------------------------------  --------------  CALL BACK INFO  What is the best way for the office to contact you? OK to leave message on   voicemail  Preferred Call Back Phone Number? [phone]  ---------------------------------------------------------------------------  --------------  SCRIPT ANSWERS  Relationship to Patient? Third Party  Representative Name?  [facility]

## 2022-01-18 RX ORDER — GUAIFENESIN 600 MG/1
600 TABLET, EXTENDED RELEASE ORAL 2 TIMES DAILY
Qty: 30 TABLET | Refills: 0 | Status: SHIPPED | OUTPATIENT
Start: 2022-01-18 | End: 2022-01-31 | Stop reason: ALTCHOICE

## 2022-01-19 ENCOUNTER — CARE COORDINATION (OUTPATIENT)
Dept: CASE MANAGEMENT | Age: 82
End: 2022-01-19

## 2022-01-19 NOTE — CARE COORDINATION
[facility] Transitions Follow Up Call    2022    Patient: Jean Pierre Cason  Patient : 1940   MRN: [id number]  Reason for Admission: Hyperkalemia  Discharge Date: 21 RARS: Readmission Risk Score: 10.1 ( )         Spoke with: Alejandro Segundo Transitions Follow Up Call    Needs to be reviewed by the provider   Additional needs identified to be addressed with provider: No  none             Method of communication with provider : none      Care Transition Nurse (CTN) contacted the family by telephone to follow up after admission on 21  . Verified name and  with family as identifiers. Addressed changes since last contact: home health care-[facility]  medications-started Mucinex for productive cough      CTN reviewed discharge instructions, medical action plan and red flags with family and discussed any barriers to care and/or understanding of plan of care after discharge. Discussed appropriate site of care based on symptoms and resources available to patient including: PCP, Specialist, Home health and When to call 911. The family agrees to contact the PCP office for questions related to their healthcare. Patients top risk factors for readmission: medical condition-hx PNA, COVID-19  Interventions to address risk factors: Obtained and reviewed discharge summary and/or continuity of care documents        CTN provided contact information for future needs. Plan for follow-up call in 5-7 days based on severity of symptoms and risk factors. Plan for next call: symptom management-cough, f/u appt with PCP and GI      Wife stated pt continues to have productive cough, Robitussin not helping. Pt was prescribed Mucinexand he just started taking it, hoping it helps. Pt doesn't have a humidifier at home, is drinking plenty of fluids. Stated he continues with [facility]. Reviewed upcoming appts with PC and GI next week. Pt has transportation.  Stated his oxygen levels have been above 90%, increasing on PO intake. Pt has nebulizer and inhaler if needed. Care Transitions Subsequent and Final Call    Subsequent and Final Calls  Do you have any ongoing symptoms?: Yes  Onset of Patient-reported symptoms: Other  Patient-reported symptoms: Cough  Interventions for patient-reported symptoms: Other  Have your medications changed?: Yes  Patient Reports: start Mucinex bid  Do you have any questions related to your medications?: No  Do you currently have any active services?: Yes  Are you currently active with any services?: Home Health  Do you have any needs or concerns that I can assist you with?: No  Identified Barriers: None  Care Transitions Interventions  Other Interventions:            Follow Up  Future Appointments   Date Time Provider Anna Srivastava   1/24/2022 10:30 AM Teresa Mckenzie MD [address]   1/25/2022  9:15 AM Stacie Salcedo MD [facility]       Will Aburto RN

## 2022-01-24 ENCOUNTER — HOSPITAL ENCOUNTER (OUTPATIENT)
Age: 82
Setting detail: SPECIMEN
Discharge: HOME | End: 2022-01-24

## 2022-01-24 ENCOUNTER — OFFICE VISIT (OUTPATIENT)
Dept: INTERNAL MEDICINE CLINIC | Age: 82
End: 2022-01-24
Payer: MEDICARE

## 2022-01-24 ENCOUNTER — TELEPHONE (OUTPATIENT)
Dept: INTERNAL MEDICINE CLINIC | Age: 82
End: 2022-01-24

## 2022-01-24 VITALS
HEART RATE: 113 BPM | OXYGEN SATURATION: 96 % | BODY MASS INDEX: 29.88 KG/M2 | HEIGHT: 64 IN | WEIGHT: 175 LBS | DIASTOLIC BLOOD PRESSURE: 68 MMHG | SYSTOLIC BLOOD PRESSURE: 118 MMHG

## 2022-01-24 DIAGNOSIS — J12.82 PNEUMONIA DUE TO COVID-19 VIRUS: ICD-10-CM

## 2022-01-24 DIAGNOSIS — U07.1 PNEUMONIA DUE TO COVID-19 VIRUS: ICD-10-CM

## 2022-01-24 DIAGNOSIS — N17.9 AKI (ACUTE KIDNEY INJURY) (HCC): ICD-10-CM

## 2022-01-24 DIAGNOSIS — R26.0 ATAXIA INVOLVING LEGS: ICD-10-CM

## 2022-01-24 DIAGNOSIS — N17.9 AKI (ACUTE KIDNEY INJURY) (HCC): Primary | ICD-10-CM

## 2022-01-24 DIAGNOSIS — I10 ESSENTIAL HYPERTENSION: ICD-10-CM

## 2022-01-24 DIAGNOSIS — M47.12 OSTEOARTHRITIS OF CERVICAL SPINE WITH MYELOPATHY: ICD-10-CM

## 2022-01-24 DIAGNOSIS — D50.0 IRON DEFICIENCY ANEMIA DUE TO CHRONIC BLOOD LOSS: ICD-10-CM

## 2022-01-24 DIAGNOSIS — M47.817 OSTEOARTHRITIS OF LUMBOSACRAL SPINE: ICD-10-CM

## 2022-01-24 LAB
ABSOLUTE EOS #: 0.11 K/UL (ref 0–0.44)
ABSOLUTE IMMATURE GRANULOCYTE: 0.17 K/UL (ref 0–0.3)
ABSOLUTE LYMPH #: 1.78 K/UL (ref 1.1–3.7)
ABSOLUTE MONO #: 0.87 K/UL (ref 0.1–1.2)
ANION GAP SERPL CALCULATED.3IONS-SCNC: 13 MMOL/L (ref 9–17)
BASOPHILS # BLD: 0 % (ref 0–2)
BASOPHILS ABSOLUTE: 0.05 K/UL (ref 0–0.2)
BUN BLDV-MCNC: 20 MG/DL (ref 8–23)
BUN/CREAT BLD: ABNORMAL (ref 9–20)
CALCIUM SERPL-MCNC: 10.1 MG/DL (ref 8.6–10.4)
CHLORIDE BLD-SCNC: 102 MMOL/L (ref 98–107)
CO2: 24 MMOL/L (ref 20–31)
CREAT SERPL-MCNC: 1.09 MG/DL (ref 0.7–1.2)
DIFFERENTIAL TYPE: ABNORMAL
EOSINOPHILS RELATIVE PERCENT: 1 % (ref 1–4)
GFR AFRICAN AMERICAN: >60 ML/MIN
GFR NON-AFRICAN AMERICAN: >60 ML/MIN
GFR SERPL CREATININE-BSD FRML MDRD: ABNORMAL ML/MIN/{1.73_M2}
GFR SERPL CREATININE-BSD FRML MDRD: ABNORMAL ML/MIN/{1.73_M2}
GLUCOSE BLD-MCNC: 107 MG/DL (ref 70–99)
HCT VFR BLD CALC: 45.9 % (ref 40.7–50.3)
HEMOGLOBIN: 14.1 G/DL (ref 13–17)
IMMATURE GRANULOCYTES: 1 %
LYMPHOCYTES # BLD: 15 % (ref 24–43)
MCH RBC QN AUTO: 28 PG (ref 25.2–33.5)
MCHC RBC AUTO-ENTMCNC: 30.7 G/DL (ref 28.4–34.8)
MCV RBC AUTO: 91.3 FL (ref 82.6–102.9)
MONOCYTES # BLD: 7 % (ref 3–12)
NRBC AUTOMATED: 0 PER 100 WBC
PDW BLD-RTO: 13.6 % (ref 11.8–14.4)
PLATELET # BLD: 678 K/UL (ref 138–453)
PLATELET ESTIMATE: ABNORMAL
PMV BLD AUTO: 9.4 FL (ref 8.1–13.5)
POTASSIUM SERPL-SCNC: 6.1 MMOL/L (ref 3.7–5.3)
RBC # BLD: 5.03 M/UL (ref 4.21–5.77)
RBC # BLD: ABNORMAL 10*6/UL
SEG NEUTROPHILS: 76 % (ref 36–65)
SEGMENTED NEUTROPHILS ABSOLUTE COUNT: 9.01 K/UL (ref 1.5–8.1)
SODIUM BLD-SCNC: 139 MMOL/L (ref 135–144)
WBC # BLD: 12 K/UL (ref 3.5–11.3)
WBC # BLD: ABNORMAL 10*3/UL

## 2022-01-24 PROCEDURE — G8484 FLU IMMUNIZE NO ADMIN: HCPCS | Performed by: INTERNAL MEDICINE

## 2022-01-24 PROCEDURE — G8417 CALC BMI ABV UP PARAM F/U: HCPCS | Performed by: INTERNAL MEDICINE

## 2022-01-24 PROCEDURE — 1111F DSCHRG MED/CURRENT MED MERGE: CPT | Performed by: INTERNAL MEDICINE

## 2022-01-24 PROCEDURE — 1123F ACP DISCUSS/DSCN MKR DOCD: CPT | Performed by: INTERNAL MEDICINE

## 2022-01-24 PROCEDURE — 99215 OFFICE O/P EST HI 40 MIN: CPT | Performed by: INTERNAL MEDICINE

## 2022-01-24 PROCEDURE — 4040F PNEUMOC VAC/ADMIN/RCVD: CPT | Performed by: INTERNAL MEDICINE

## 2022-01-24 PROCEDURE — G8427 DOCREV CUR MEDS BY ELIG CLIN: HCPCS | Performed by: INTERNAL MEDICINE

## 2022-01-24 PROCEDURE — 1036F TOBACCO NON-USER: CPT | Performed by: INTERNAL MEDICINE

## 2022-01-24 ASSESSMENT — ENCOUNTER SYMPTOMS
COUGH: 1
GASTROINTESTINAL NEGATIVE: 1
EYES NEGATIVE: 1

## 2022-01-24 NOTE — PROGRESS NOTES
Subjective:      Patient ID: Myesha Head is a 80 y.o. male. Continues to have cervical pain due to cervical osteoarthritis. He also has lumbosacral osteoarthritis he is on narcotics plus gabapentin. I will do my best to reduce the dosage  This is a special visit after recent hospitalization. Admitted to the hospital with DANIELLE and COVID-19 pneumonia he was volume depleted and also developed COVID-19 respiratory symptoms his clinical condition is improved. He continues to have ataxia which is most likely due to cervical lumbosacral osteoarthritis. He has been on long-term narcotic therapy for the. Today he has persistent cough but no sputum production. States that he does not have dyspnea on exertion    Hypertension  This is a chronic problem. The current episode started more than 1 year ago. The problem is unchanged. The problem is controlled. Associated symptoms include anxiety and neck pain. There are no associated agents to hypertension. Risk factors for coronary artery disease include dyslipidemia and obesity. Past treatments include ACE inhibitors. The current treatment provides moderate improvement. Review of Systems   Constitutional: Negative. HENT: Negative. Eyes: Negative. Respiratory: Positive for cough. Cardiovascular: Negative. Gastrointestinal: Negative. Endocrine: Negative. Genitourinary: Negative. Musculoskeletal: Positive for neck pain.        Objective:   Physical Exam hysical Exam Physical Exam hysical Exam CONSTITUTIONAL: Alert and oriented   HEENT: Atraumatic, conjunctiva normal colored, no jaundice  NECK: No thyroid enlargement, no lymphadenopathy, no JVD   CHEST: Breath sounds normal, no wheezing, no rales   CVS: S1 S2 normal, no murmur, no gallop, no carotid bruit  ABDOMEN: Soft, non tender, no mass, no hepatosplenomegaly   NEUROLOGICAL: Alert, CN 2-12 intact, no motor deficits   EXTREMITIES: No pedal edema, no calf tenderness= osteoarthritis multiple joints including knees shoulders  cervical spine and lumbar region  VASCULAR: Aorta not palpable, femorals 2+,   SKIN: No obvious lesions or rash       Assessment / Plan:       Diagnosis Orders   1. DANIELLE (acute kidney injury) ([facility] [address] = will repeat renal function    2. Essential hypertension = controlled    3. Pneumonia due to COVID-19 virus = is extensive review of his hospital stay he was treated with antibiotic regimen and fluid and he improved    4. Osteoarthritis of cervical spine with myelopathy     5. Osteoarthritis of lumbosacral spine = continues to have ataxia and is a candidate for home physical    6. Iron deficiency anemia due to chronic blood loss     7. Ataxia involving legs = his ataxia is due to cervical and lumbosacral osteoarthritis      Return in about 12 weeks (around 4/18/2022) for Follow Up. Orders Placed This Encounter   Procedures    CBC Auto Differential     Standing Status:   Future     Standing Expiration Date:   1/24/2023    Basic Metabolic Panel     Standing Status:   Future     Standing Expiration Date:   1/24/2023   [address]     Referral Priority:   Routine     Referral Type:   Eval and Treat     Referral Reason:   Specialty Services Required     Requested Specialty:   Physical Therapy     Number of Visits Requested:   1     No orders of the defined types were placed in this encounter. The patient's laboratory work-up showed that his potassium is 6.1 days BUN is 20 and creatinine is 1.09 his blood pressure was well controlled today I called the patient's wife and told her that she should stop lisinopril I will repeat his BMP on Thursday and then he will see me a week from today for blood pressure assessment repeat BMP     Visit Information    Have you changed or started any medications since your last visit including any over-the-counter medicines, vitamins, or herbal medicines?  no   Are you having any side effects from any of your medications? -  no  Have you stopped taking any of your medications? Is so, why? -  no    Have you seen any other physician or provider since your last visit? Yes - Records Obtained  Have you had any other diagnostic tests since your last visit? Yes - Records Obtained  Have you been seen in the emergency room and/or had an admission to a hospital since we last saw you? Yes - Records Obtained  Have you had your routine dental cleaning in the past 6 months? no    Have you activated your [employer] account? If not, what are your barriers?  No:      Patient Care Team:  Rogene Favre, MD as PCP - General (Internal Medicine)  Rogene Favre, MD as PCP [facility]  Mary Nolen RN as Care Transitions Nurse  Sandie Grady MD as Consulting Physician (Gastroenterology)    Medical History Review  Past Medical, Family, and Social History reviewed and does contribute to the patient presenting condition    Health Maintenance   Topic Date Due    COVID-19 Vaccine (1) Never done    DTaP/Tdap/Td vaccine (1 - Tdap) Never done    Shingles Vaccine (1 of 2) Never done    Pneumococcal 65+ years Vaccine (1 of 1 - PPSV23) Never done    Flu vaccine (1) Never done    Depression Screen  07/21/2022    Annual Wellness Visit (AWV)  07/22/2022    Lipid screen  10/12/2022    Potassium monitoring  12/27/2022    Creatinine monitoring  12/27/2022    Hepatitis A vaccine  Aged Out    Hepatitis B vaccine  Aged Out    Hib vaccine  Aged Out    Meningococcal (ACWY) vaccine  Aged Out

## 2022-01-24 NOTE — TELEPHONE ENCOUNTER
[facility] 6.1    Dr. Saleem Kern personally spoke with patients wife Todd Bradshaw. He ordered a repeat BMP to have done on Thursday 1/27, he would like to see Holly Harris in 1 week from today. Dr. Saleem Kern also advised to discontinue the Lisinopril. Todd Leeann expressed understanding  I will call to schedule the appt.

## 2022-01-25 ENCOUNTER — CARE COORDINATION (OUTPATIENT)
Dept: CASE MANAGEMENT | Age: 82
End: 2022-01-25

## 2022-01-27 ENCOUNTER — HOSPITAL ENCOUNTER (OUTPATIENT)
Age: 82
Setting detail: SPECIMEN
Discharge: HOME | End: 2022-01-27

## 2022-01-27 DIAGNOSIS — N17.9 AKI (ACUTE KIDNEY INJURY) (HCC): ICD-10-CM

## 2022-01-27 LAB
ANION GAP SERPL CALCULATED.3IONS-SCNC: 12 MMOL/L (ref 9–17)
BUN BLDV-MCNC: 18 MG/DL (ref 8–23)
BUN/CREAT BLD: ABNORMAL (ref 9–20)
CALCIUM SERPL-MCNC: 9.8 MG/DL (ref 8.6–10.4)
CHLORIDE BLD-SCNC: 103 MMOL/L (ref 98–107)
CO2: 23 MMOL/L (ref 20–31)
CREAT SERPL-MCNC: 0.87 MG/DL (ref 0.7–1.2)
GFR AFRICAN AMERICAN: >60 ML/MIN
GFR NON-AFRICAN AMERICAN: >60 ML/MIN
GFR SERPL CREATININE-BSD FRML MDRD: ABNORMAL ML/MIN/{1.73_M2}
GFR SERPL CREATININE-BSD FRML MDRD: ABNORMAL ML/MIN/{1.73_M2}
GLUCOSE BLD-MCNC: 108 MG/DL (ref 70–99)
POTASSIUM SERPL-SCNC: 5.9 MMOL/L (ref 3.7–5.3)
SODIUM BLD-SCNC: 138 MMOL/L (ref 135–144)

## 2022-01-31 ENCOUNTER — OFFICE VISIT (OUTPATIENT)
Dept: INTERNAL MEDICINE CLINIC | Age: 82
End: 2022-01-31
Payer: MEDICARE

## 2022-01-31 ENCOUNTER — HOSPITAL ENCOUNTER (OUTPATIENT)
Age: 82
Setting detail: SPECIMEN
Discharge: HOME | End: 2022-01-31

## 2022-01-31 VITALS — SYSTOLIC BLOOD PRESSURE: 130 MMHG | WEIGHT: 178 LBS | DIASTOLIC BLOOD PRESSURE: 72 MMHG | BODY MASS INDEX: 30.55 KG/M2

## 2022-01-31 DIAGNOSIS — M47.12 OSTEOARTHRITIS OF CERVICAL SPINE WITH MYELOPATHY: ICD-10-CM

## 2022-01-31 DIAGNOSIS — U07.1 PNEUMONIA DUE TO COVID-19 VIRUS: ICD-10-CM

## 2022-01-31 DIAGNOSIS — J12.82 PNEUMONIA DUE TO COVID-19 VIRUS: ICD-10-CM

## 2022-01-31 DIAGNOSIS — I10 ESSENTIAL HYPERTENSION: ICD-10-CM

## 2022-01-31 DIAGNOSIS — E87.5 HYPERKALEMIA: ICD-10-CM

## 2022-01-31 DIAGNOSIS — N17.9 AKI (ACUTE KIDNEY INJURY) (HCC): Primary | ICD-10-CM

## 2022-01-31 DIAGNOSIS — N17.9 AKI (ACUTE KIDNEY INJURY) (HCC): ICD-10-CM

## 2022-01-31 DIAGNOSIS — M47.817 OSTEOARTHRITIS OF LUMBOSACRAL SPINE: ICD-10-CM

## 2022-01-31 PROCEDURE — 1036F TOBACCO NON-USER: CPT | Performed by: INTERNAL MEDICINE

## 2022-01-31 PROCEDURE — G8484 FLU IMMUNIZE NO ADMIN: HCPCS | Performed by: INTERNAL MEDICINE

## 2022-01-31 PROCEDURE — 4040F PNEUMOC VAC/ADMIN/RCVD: CPT | Performed by: INTERNAL MEDICINE

## 2022-01-31 PROCEDURE — 99214 OFFICE O/P EST MOD 30 MIN: CPT | Performed by: INTERNAL MEDICINE

## 2022-01-31 PROCEDURE — G8417 CALC BMI ABV UP PARAM F/U: HCPCS | Performed by: INTERNAL MEDICINE

## 2022-01-31 PROCEDURE — 1123F ACP DISCUSS/DSCN MKR DOCD: CPT | Performed by: INTERNAL MEDICINE

## 2022-01-31 PROCEDURE — G8427 DOCREV CUR MEDS BY ELIG CLIN: HCPCS | Performed by: INTERNAL MEDICINE

## 2022-01-31 RX ORDER — LISINOPRIL 20 MG/1
TABLET ORAL
COMMUNITY
Start: 2022-01-27 | End: 2022-01-31

## 2022-01-31 ASSESSMENT — ENCOUNTER SYMPTOMS
EYES NEGATIVE: 1
COUGH: 1
GASTROINTESTINAL NEGATIVE: 1

## 2022-01-31 NOTE — PROGRESS NOTES
Subjective:      Patient ID: Sumeet Villar is a 80 y.o. male. Continues to have cervical pain due to cervical osteoarthritis. He also has lumbosacral osteoarthritis he is on narcotics plus gabapentin. I will do my best to reduce the dosage  This is a special visit after recent hospitalization. Admitted to the hospital with DANIELLE and COVID-19 pneumonia he was volume depleted and also developed COVID-19 respiratory symptoms his clinical condition is improved. He continues to have ataxia which is most likely due to cervical lumbosacral osteoarthritis. He has been on long-term narcotic therapy for the. Today he has persistent cough but no sputum production. States that he does not have dyspnea on exertion  The patient was found to have a high potassium of 6.1, he was taken off ACE inhibitor and his potassium came down to 5.9 he was advised that he should drink water today his blood pressure is 132/70 and he is asymptomatic. I will repeat his BMP today    Hypertension  This is a chronic problem. The current episode started more than 1 year ago. The problem is unchanged. The problem is controlled. Associated symptoms include anxiety and neck pain. There are no associated agents to hypertension. Risk factors for coronary artery disease include dyslipidemia and obesity. Past treatments include ACE inhibitors. The current treatment provides moderate improvement. Review of Systems   Constitutional: Negative. HENT: Negative. Eyes: Negative. Respiratory: Positive for cough. Cardiovascular: Negative. Gastrointestinal: Negative. Endocrine: Negative. Genitourinary: Negative. Musculoskeletal: Positive for neck pain.        Objective:   Physical Exam Physical Exam hysical Exam Physical Exam hysical Exam CONSTITUTIONAL: Alert and oriented   HEENT: Atraumatic, conjunctiva normal colored, no jaundice  NECK: No thyroid enlargement, no lymphadenopathy, no JVD   CHEST: Breath sounds normal, no wheezing, no rales   CVS: S1 S2 normal, no murmur, no gallop, no carotid bruit  ABDOMEN: Soft, non tender, no mass, no hepatosplenomegaly   NEUROLOGICAL: Alert, CN 2-12 intact, no motor deficits   EXTREMITIES: No pedal edema, no calf tenderness= osteoarthritis multiple joints including knees shoulders  cervical spine and lumbar region  VASCULAR: Aorta not palpable, femorals 2+,   SKIN: No obvious lesions or rash       Assessment / Plan:       Diagnosis Orders   1. DANIELLE (acute kidney injury) ([facility] [address]     2. Essential hypertension     3. Pneumonia due to COVID-19 virus     4. Osteoarthritis of cervical spine with myelopathy     5. Osteoarthritis of lumbosacral spine     6. Hyperkalemia -his potassium came down to 5.9 from 6.1. He was advised to drink more water and I will repeat his renal function. He was advised to stop lisinopril        Return in about 4 weeks (around 2/28/2022) for Follow Up. Orders Placed This Encounter   Procedures    Basic Metabolic Panel     Standing Status:   Future     Standing Expiration Date:   1/31/2023     No orders of the defined types were placed in this encounter. Visit Information    Have you changed or started any medications since your last visit including any over-the-counter medicines, vitamins, or herbal medicines? no   Are you having any side effects from any of your medications? -  no  Have you stopped taking any of your medications? Is so, why? -  no    Have you seen any other physician or provider since your last visit? No  Have you had any other diagnostic tests since your last visit? No  Have you been seen in the emergency room and/or had an admission to a hospital since we last saw you? No  Have you had your routine dental cleaning in the past 6 months? no    Have you activated your [employer] account? If not, what are your barriers?  No:      Patient Care Team:  Blane Valdez MD as PCP - General (Internal Medicine)  Blane Valdez MD as PCP - [address] [place] [address] [address] Provider  Suzanna Allen MD as Consulting Physician (Gastroenterology)    Medical History Review  Past Medical, Family, and Social History reviewed and does not contribute to the patient presenting condition    Health Maintenance   Topic Date Due    COVID-19 Vaccine (1) Never done    DTaP/Tdap/Td vaccine (1 - Tdap) Never done    Shingles Vaccine (1 of 2) Never done    Pneumococcal 65+ years Vaccine (1 of 1 - PPSV23) Never done    Flu vaccine (1) Never done    Depression Screen  07/21/2022    Annual Wellness Visit (AWV)  07/22/2022    Lipid screen  10/12/2022    Potassium monitoring  01/27/2023    Creatinine monitoring  01/27/2023    Hepatitis A vaccine  Aged Out    Hepatitis B vaccine  Aged Out    Hib vaccine  Aged Out    Meningococcal (ACWY) vaccine  Aged Out

## 2022-02-01 LAB
ANION GAP SERPL CALCULATED.3IONS-SCNC: 11 MMOL/L (ref 9–17)
BUN BLDV-MCNC: 22 MG/DL (ref 8–23)
BUN/CREAT BLD: NORMAL (ref 9–20)
CALCIUM SERPL-MCNC: 10 MG/DL (ref 8.6–10.4)
CHLORIDE BLD-SCNC: 102 MMOL/L (ref 98–107)
CO2: 25 MMOL/L (ref 20–31)
CREAT SERPL-MCNC: 1.15 MG/DL (ref 0.7–1.2)
GFR AFRICAN AMERICAN: >60 ML/MIN
GFR NON-AFRICAN AMERICAN: >60 ML/MIN
GFR SERPL CREATININE-BSD FRML MDRD: NORMAL ML/MIN/{1.73_M2}
GFR SERPL CREATININE-BSD FRML MDRD: NORMAL ML/MIN/{1.73_M2}
GLUCOSE BLD-MCNC: 77 MG/DL (ref 70–99)
POTASSIUM SERPL-SCNC: 5.3 MMOL/L (ref 3.7–5.3)
SODIUM BLD-SCNC: 138 MMOL/L (ref 135–144)

## 2022-02-15 DIAGNOSIS — M47.12 OSTEOARTHRITIS OF CERVICAL SPINE WITH MYELOPATHY: ICD-10-CM

## 2022-02-15 DIAGNOSIS — M47.817 OSTEOARTHRITIS OF LUMBOSACRAL SPINE: ICD-10-CM

## 2022-02-15 RX ORDER — HYDROCODONE BITARTRATE AND ACETAMINOPHEN 10; 325 MG/1; MG/1
1 TABLET ORAL EVERY 6 HOURS PRN
Qty: 120 TABLET | Refills: 0 | Status: SHIPPED | OUTPATIENT
Start: 2022-02-15 | End: 2022-03-14 | Stop reason: SDUPTHER

## 2022-02-15 NOTE — TELEPHONE ENCOUNTER
Medication Requested: Ashanti Figueroa    Last visit: 22    Next visit: 2022    Last refill: 22    Med contract on file:  [] yes   [x] no    Last urine drug screen: none on file    Consistent with medication(s):    [] yes   [] no    Last OARRS ran: unk    Quantity of medication remainin days    Who will be picking rx up:     Pharmacy if escribed: [facility]ana Melendrez

## 2022-02-28 ENCOUNTER — OFFICE VISIT (OUTPATIENT)
Dept: INTERNAL MEDICINE CLINIC | Age: 82
End: 2022-02-28
Payer: MEDICARE

## 2022-02-28 ENCOUNTER — HOSPITAL ENCOUNTER (OUTPATIENT)
Age: 82
Setting detail: SPECIMEN
Discharge: HOME | End: 2022-02-28

## 2022-02-28 VITALS
DIASTOLIC BLOOD PRESSURE: 74 MMHG | BODY MASS INDEX: 30.97 KG/M2 | SYSTOLIC BLOOD PRESSURE: 132 MMHG | WEIGHT: 181.4 LBS | HEIGHT: 64 IN | HEART RATE: 61 BPM | OXYGEN SATURATION: 98 %

## 2022-02-28 DIAGNOSIS — J12.82 PNEUMONIA DUE TO COVID-19 VIRUS: ICD-10-CM

## 2022-02-28 DIAGNOSIS — N17.9 AKI (ACUTE KIDNEY INJURY) (HCC): Primary | ICD-10-CM

## 2022-02-28 DIAGNOSIS — U07.1 PNEUMONIA DUE TO COVID-19 VIRUS: ICD-10-CM

## 2022-02-28 DIAGNOSIS — R26.0 ATAXIA INVOLVING LEGS: ICD-10-CM

## 2022-02-28 DIAGNOSIS — I10 ESSENTIAL HYPERTENSION: ICD-10-CM

## 2022-02-28 DIAGNOSIS — M47.817 OSTEOARTHRITIS OF LUMBOSACRAL SPINE: ICD-10-CM

## 2022-02-28 DIAGNOSIS — M47.12 OSTEOARTHRITIS OF CERVICAL SPINE WITH MYELOPATHY: ICD-10-CM

## 2022-02-28 PROBLEM — E87.5 HYPERKALEMIA: Status: RESOLVED | Noted: 2022-01-31 | Resolved: 2022-02-28

## 2022-02-28 LAB
ANION GAP SERPL CALCULATED.3IONS-SCNC: 10 MMOL/L (ref 9–17)
BUN BLDV-MCNC: 15 MG/DL (ref 8–23)
CALCIUM SERPL-MCNC: 9.7 MG/DL (ref 8.6–10.4)
CHLORIDE BLD-SCNC: 101 MMOL/L (ref 98–107)
CO2: 28 MMOL/L (ref 20–31)
CREAT SERPL-MCNC: 0.81 MG/DL (ref 0.7–1.2)
GFR AFRICAN AMERICAN: >60 ML/MIN
GFR NON-AFRICAN AMERICAN: >60 ML/MIN
GFR SERPL CREATININE-BSD FRML MDRD: NORMAL ML/MIN/{1.73_M2}
GLUCOSE BLD-MCNC: 92 MG/DL (ref 70–99)
POTASSIUM SERPL-SCNC: 5.2 MMOL/L (ref 3.7–5.3)
SODIUM BLD-SCNC: 139 MMOL/L (ref 135–144)

## 2022-02-28 PROCEDURE — 4040F PNEUMOC VAC/ADMIN/RCVD: CPT | Performed by: INTERNAL MEDICINE

## 2022-02-28 PROCEDURE — G8417 CALC BMI ABV UP PARAM F/U: HCPCS | Performed by: INTERNAL MEDICINE

## 2022-02-28 PROCEDURE — 1123F ACP DISCUSS/DSCN MKR DOCD: CPT | Performed by: INTERNAL MEDICINE

## 2022-02-28 PROCEDURE — G8427 DOCREV CUR MEDS BY ELIG CLIN: HCPCS | Performed by: INTERNAL MEDICINE

## 2022-02-28 PROCEDURE — G8484 FLU IMMUNIZE NO ADMIN: HCPCS | Performed by: INTERNAL MEDICINE

## 2022-02-28 PROCEDURE — 1036F TOBACCO NON-USER: CPT | Performed by: INTERNAL MEDICINE

## 2022-02-28 PROCEDURE — 99214 OFFICE O/P EST MOD 30 MIN: CPT | Performed by: INTERNAL MEDICINE

## 2022-02-28 ASSESSMENT — ENCOUNTER SYMPTOMS
BACK PAIN: 1
GASTROINTESTINAL NEGATIVE: 1
COUGH: 0
EYES NEGATIVE: 1

## 2022-02-28 NOTE — PROGRESS NOTES
Subjective:      Patient ID: Mireille Sy is a 80 y.o. male. Continues to have cervical pain due to cervical osteoarthritis. He also has lumbosacral osteoarthritis he is on narcotics plus gabapentin. I will do my best to reduce the dosage  This is a special visit after recent hospitalization. Admitted to the hospital with DANIELLE and COVID-19 pneumonia he was volume depleted and also developed COVID-19 respiratory symptoms his clinical condition is improved. He continues to have ataxia which is most likely due to cervical lumbosacral osteoarthritis. He has been on long-term narcotic therapy for the. Today he has persistent cough but no sputum production. States that he does not have dyspnea on exertion  The patient was found to have a high potassium of 6.1, he was taken off ACE inhibitor and his potassium came down to 5.9 he was advised that he should drink water today his blood pressure is 132/70 and he is asymptomatic. I will repeat his BMP today  Today on February 27 he is symptomatic with persistent backache and neck pain he is known to have advanced osteoarthritis involving cervical and lumbar spine and requires narcotic therapy. He denied any other symptoms      Hypertension  This is a chronic problem. The current episode started more than 1 year ago. The problem is unchanged. The problem is controlled. Associated symptoms include anxiety and neck pain. There are no associated agents to hypertension. Risk factors for coronary artery disease include dyslipidemia and obesity. Past treatments include ACE inhibitors. The current treatment provides moderate improvement. Review of Systems   Constitutional: Negative. HENT: Negative. Eyes: Negative. Respiratory: Negative for cough. Cardiovascular: Negative. Gastrointestinal: Negative. Endocrine: Negative. Genitourinary: Negative. Musculoskeletal: Positive for back pain and neck pain.        Objective:   Physical Exam hysical Exam Physical Exam hysical Exam Physical Exam hysical Exam CONSTITUTIONAL: Alert and oriented   HEENT: Atraumatic, conjunctiva normal colored, no jaundice  NECK: No thyroid enlargement, no lymphadenopathy, no JVD   CHEST: Breath sounds normal, no wheezing, no rales   CVS: S1 S2 normal, no murmur, no gallop, no carotid bruit  ABDOMEN: Soft, non tender, no mass, no hepatosplenomegaly   NEUROLOGICAL: Alert, CN 2-12 intact, no motor deficits   EXTREMITIES: No pedal edema, no calf tenderness= osteoarthritis multiple joints including knees shoulders  cervical spine and lumbar region  VASCULAR: Aorta not palpable, femorals 2+,   SKIN: No obvious lesions or rash       Assessment / Plan:      Diagnosis Orders   1. DANIELLE (acute kidney injury) ([facility] [address]     2. Essential hypertension     3. Pneumonia due to COVID-19 virus     4. Osteoarthritis of cervical spine with myelopathy     5. Osteoarthritis of lumbosacral spine     6. Ataxia involving legs        Return in about 8 weeks (around 4/25/2022) for Follow Up. Orders Placed This Encounter   Procedures    Basic Metabolic Panel     Standing Status:   Future     Standing Expiration Date:   2/28/2023     No orders of the defined types were placed in this encounter. Visit Information    Have you changed or started any medications since your last visit including any over-the-counter medicines, vitamins, or herbal medicines? no   Are you having any side effects from any of your medications? -  no  Have you stopped taking any of your medications? Is so, why? -  no    Have you seen any other physician or provider since your last visit? No  Have you had any other diagnostic tests since your last visit? No  Have you been seen in the emergency room and/or had an admission to a hospital since we last saw you? No  Have you had your routine dental cleaning in the past 6 months? no    Have you activated your [employer] account? If not, what are your barriers?  No:      Patient Care Team:  [place] Terry Amy Stern MD as PCP - General (Internal Medicine)  Dustin Cary MD as PCP - [facility] Provider  Louis Pineda MD as Consulting Physician (Gastroenterology)    Medical History Review  Past Medical, Family, and Social History reviewed and does contribute to the patient presenting condition    Health Maintenance   Topic Date Due    COVID-19 Vaccine (1) Never done    DTaP/Tdap/Td vaccine (1 - Tdap) Never done    Shingles Vaccine (1 of 2) Never done    Pneumococcal 65+ years Vaccine (1 of 1 - PPSV23) Never done    Flu vaccine (1) Never done    Depression Screen  07/21/2022    Annual Wellness Visit (AWV)  07/22/2022    Lipid screen  10/12/2022    Potassium monitoring  01/31/2023    Creatinine monitoring  01/31/2023    Hepatitis A vaccine  Aged Out    Hepatitis B vaccine  Aged Out    Hib vaccine  Aged Out    Meningococcal (ACWY) vaccine  Aged Out

## 2022-03-14 DIAGNOSIS — M47.817 OSTEOARTHRITIS OF LUMBOSACRAL SPINE: ICD-10-CM

## 2022-03-14 DIAGNOSIS — M47.12 OSTEOARTHRITIS OF CERVICAL SPINE WITH MYELOPATHY: ICD-10-CM

## 2022-03-14 RX ORDER — HYDROCODONE BITARTRATE AND ACETAMINOPHEN 10; 325 MG/1; MG/1
1 TABLET ORAL EVERY 6 HOURS PRN
Qty: 120 TABLET | Refills: 0 | Status: SHIPPED | OUTPATIENT
Start: 2022-03-14 | End: 2022-04-15 | Stop reason: SDUPTHER

## 2022-04-15 DIAGNOSIS — M47.12 OSTEOARTHRITIS OF CERVICAL SPINE WITH MYELOPATHY: ICD-10-CM

## 2022-04-15 DIAGNOSIS — M47.817 OSTEOARTHRITIS OF LUMBOSACRAL SPINE: ICD-10-CM

## 2022-04-15 RX ORDER — HYDROCODONE BITARTRATE AND ACETAMINOPHEN 10; 325 MG/1; MG/1
1 TABLET ORAL EVERY 6 HOURS PRN
Qty: 120 TABLET | Refills: 0 | Status: SHIPPED | OUTPATIENT
Start: 2022-04-15 | End: 2022-04-18 | Stop reason: SDUPTHER

## 2022-04-15 NOTE — TELEPHONE ENCOUNTER
Medication Requested: Heri Pillai's patient--he is off    Last visit: 02/28/22  Next visit: 5/18/2022  Last refill: 03/14/22    Med contract on file:  [x] yes   [] no    Last urine drug screen: 04/15/21    Consistent with medication(s):    [] yes   [] no    Last OARRS ran: unk    Quantity of medication remaining: none    Who will be picking rx up:     Pharmacy if escribed: Sandra Chaparro

## 2022-04-18 DIAGNOSIS — M47.817 OSTEOARTHRITIS OF LUMBOSACRAL SPINE: ICD-10-CM

## 2022-04-18 DIAGNOSIS — M47.12 OSTEOARTHRITIS OF CERVICAL SPINE WITH MYELOPATHY: ICD-10-CM

## 2022-04-18 RX ORDER — HYDROCODONE BITARTRATE AND ACETAMINOPHEN 10; 325 MG/1; MG/1
1 TABLET ORAL EVERY 6 HOURS PRN
Qty: 120 TABLET | Refills: 0 | Status: SHIPPED | OUTPATIENT
Start: 2022-04-18 | End: 2022-05-16 | Stop reason: SDUPTHER

## 2022-05-05 RX ORDER — ALBUTEROL SULFATE 90 UG/1
AEROSOL, METERED RESPIRATORY (INHALATION)
Qty: 34 G | Refills: 3 | Status: SHIPPED | OUTPATIENT
Start: 2022-05-05

## 2022-05-16 DIAGNOSIS — M47.817 OSTEOARTHRITIS OF LUMBOSACRAL SPINE: ICD-10-CM

## 2022-05-16 DIAGNOSIS — M47.12 OSTEOARTHRITIS OF CERVICAL SPINE WITH MYELOPATHY: ICD-10-CM

## 2022-05-16 RX ORDER — HYDROCODONE BITARTRATE AND ACETAMINOPHEN 10; 325 MG/1; MG/1
1 TABLET ORAL EVERY 6 HOURS PRN
Qty: 120 TABLET | Refills: 0 | Status: SHIPPED | OUTPATIENT
Start: 2022-05-16 | End: 2022-06-15 | Stop reason: SDUPTHER

## 2022-05-16 NOTE — TELEPHONE ENCOUNTER
----- Message from Norman [address]ts [address] sent at 5/16/2022  8:46 AM EDT -----  Subject: Refill Request    QUESTIONS  Name of Medication? HYDROcodone-acetaminophen (NORCO)  MG per tablet  Patient-reported dosage and instructions? 4 a day  How many days do you have left? 1  Preferred Pharmacy? [facility] [id number]  Pharmacy phone number (if available)? [phone]  ---------------------------------------------------------------------------  --------------  CALL BACK INFO  What is the best way for the office to contact you? OK to leave message on   voicemail  Preferred Call Back Phone Number? [phone]  ---------------------------------------------------------------------------  --------------  SCRIPT ANSWERS  Relationship to Patient? Other  Representative Name? wife  Is the Representative on the appropriate HIPAA document in Epic?  Yes

## 2022-05-18 ENCOUNTER — OFFICE VISIT (OUTPATIENT)
Dept: INTERNAL MEDICINE CLINIC | Age: 82
End: 2022-05-18
Payer: MEDICARE

## 2022-05-18 VITALS — DIASTOLIC BLOOD PRESSURE: 60 MMHG | SYSTOLIC BLOOD PRESSURE: 102 MMHG | WEIGHT: 177 LBS | BODY MASS INDEX: 30.38 KG/M2

## 2022-05-18 DIAGNOSIS — I10 ESSENTIAL HYPERTENSION: Primary | ICD-10-CM

## 2022-05-18 DIAGNOSIS — M47.817 OSTEOARTHRITIS OF LUMBOSACRAL SPINE: ICD-10-CM

## 2022-05-18 DIAGNOSIS — M47.12 OSTEOARTHRITIS OF CERVICAL SPINE WITH MYELOPATHY: ICD-10-CM

## 2022-05-18 PROCEDURE — G8417 CALC BMI ABV UP PARAM F/U: HCPCS | Performed by: INTERNAL MEDICINE

## 2022-05-18 PROCEDURE — G8427 DOCREV CUR MEDS BY ELIG CLIN: HCPCS | Performed by: INTERNAL MEDICINE

## 2022-05-18 PROCEDURE — 1123F ACP DISCUSS/DSCN MKR DOCD: CPT | Performed by: INTERNAL MEDICINE

## 2022-05-18 PROCEDURE — 99214 OFFICE O/P EST MOD 30 MIN: CPT | Performed by: INTERNAL MEDICINE

## 2022-05-18 PROCEDURE — 1036F TOBACCO NON-USER: CPT | Performed by: INTERNAL MEDICINE

## 2022-05-18 PROCEDURE — 4040F PNEUMOC VAC/ADMIN/RCVD: CPT | Performed by: INTERNAL MEDICINE

## 2022-05-18 ASSESSMENT — ENCOUNTER SYMPTOMS
COUGH: 0
BACK PAIN: 1
EYES NEGATIVE: 1
GASTROINTESTINAL NEGATIVE: 1

## 2022-05-18 NOTE — PROGRESS NOTES
Subjective:      Patient ID: Monet Freed is a 80 y.o. male. Continues to have cervical pain due to cervical osteoarthritis. He also has lumbosacral osteoarthritis he is on narcotics plus gabapentin. I will do my best to reduce the dosage  This is a special visit after recent hospitalization. Admitted to the hospital with DANIELLE and COVID-19 pneumonia he was volume depleted and also developed COVID-19 respiratory symptoms his clinical condition is improved. He continues to have ataxia which is most likely due to cervical lumbosacral osteoarthritis. He has been on long-term narcotic therapy for the. Today he has persistent cough but no sputum production. States that he does not have dyspnea on exertion  The patient was found to have a high potassium of 6.1, he was taken off ACE inhibitor and his potassium came down to 5.9 he was advised that he should drink water today his blood pressure is 132/70 and he is asymptomatic. I will repeat his BMP today  Today on February 27 he is symptomatic with persistent backache and neck pain he is known to have advanced osteoarthritis involving cervical and lumbar spine and requires narcotic therapy. He denied any other symptoms  Today on May 18, 2022 he is here for follow-up of hypertension, advanced osteoarthritis involving multiple joints especially cervical spine. Is to be noted that he does have  ataxia due to cervical osteoarthritis. He is also here for follow-up of hypertension    Hypertension  This is a chronic problem. The current episode started more than 1 year ago. The problem is unchanged. The problem is controlled. Associated symptoms include anxiety and neck pain. There are no associated agents to hypertension. Risk factors for coronary artery disease include dyslipidemia and obesity. Past treatments include ACE inhibitors. The current treatment provides moderate improvement. Review of Systems   Constitutional: Negative. HENT: Negative.     Eyes: Negative. Respiratory: Negative for cough. Cardiovascular: Negative. Gastrointestinal: Negative. Endocrine: Negative. Genitourinary: Negative. Musculoskeletal: Positive for back pain and neck pain. Objective:   Physical Exam   Subjective:      Patient ID: Monet Freed is a 80 y.o. male. Continues to have cervical pain due to cervical osteoarthritis. He also has lumbosacral osteoarthritis he is on narcotics plus gabapentin. I will do my best to reduce the dosage  This is a special visit after recent hospitalization. Admitted to the hospital with DANIELLE and COVID-19 pneumonia he was volume depleted and also developed COVID-19 respiratory symptoms his clinical condition is improved. He continues to have ataxia which is most likely due to cervical lumbosacral osteoarthritis. He has been on long-term narcotic therapy for the. Today he has persistent cough but no sputum production. States that he does not have dyspnea on exertion  The patient was found to have a high potassium of 6.1, he was taken off ACE inhibitor and his potassium came down to 5.9 he was advised that he should drink water today his blood pressure is 132/70 and he is asymptomatic. I will repeat his BMP today  Today on February 27 he is symptomatic with persistent backache and neck pain he is known to have advanced osteoarthritis involving cervical and lumbar spine and requires narcotic therapy. He denied any other symptoms      Hypertension  This is a chronic problem. The current episode started more than 1 year ago. The problem is unchanged. The problem is controlled. Associated symptoms include anxiety and neck pain. There are no associated agents to hypertension. Risk factors for coronary artery disease include dyslipidemia and obesity. Past treatments include ACE inhibitors. The current treatment provides moderate improvement. Review of Systems   Constitutional: Negative. HENT: Negative. Eyes: Negative. Respiratory: Negative for cough. Cardiovascular: Negative. Gastrointestinal: Negative. Endocrine: Negative. Genitourinary: Negative. Musculoskeletal: Positive for back pain and neck pain. Objective:   Physical Exam hysical Exam Physical Exam hysical Exam Physical Exam hysical Exam CONSTITUTIONAL: Alert and oriented   HEENT: Atraumatic, conjunctiva normal colored, no jaundice  NECK: No thyroid enlargement, no lymphadenopathy, no JVD   CHEST: Breath sounds normal, no wheezing, no rales   CVS: S1 S2 normal, no murmur, no gallop, no carotid bruit  ABDOMEN: Soft, non tender, no mass, no hepatosplenomegaly   NEUROLOGICAL: Alert, CN 2-12 intact, no motor deficits   EXTREMITIES: No pedal edema, no calf tenderness= osteoarthritis multiple joints including knees shoulders  cervical spine and lumbar region  VASCULAR: Aorta not palpable, femorals 2+,   SKIN: No obvious lesions or rash       Assessment / Plan:      Diagnosis Orders   1. 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 Osteoarthritis of lumbosacral spine        Return in about 12 weeks (around 8/10/2022) for Follow Up, hypertension, osteoarthritis. No orders of the defined types were placed in this encounter. No orders of the defined types were placed in this encounter. Today his multiple problems were reviewed which includes hypertension and cervical osteoarthritis and chronic pain syndrome. His medication was reviewed he was advised regarding the side effects  Visit Information    Have you changed or started any medications since your last visit including any over-the-counter medicines, vitamins, or herbal medicines? no   Are you having any side effects from any of your medications? -  no  Have you stopped taking any of your medications? Is so, why? -  no    Have you seen any other physician or provider since your last visit? No  Have you had any other diagnostic tests since your last visit? No  Have you been seen in the emergency room and/or had an admission to a hospital since we last saw you? No  Have you had your routine dental cleaning in the past 6 months? no    Have you activated your [employer] account? If not, what are your barriers? No:      Patient Care Team:  Colt Portillo MD as PCP - General (Internal Medicine)  Colt Portillo MD as PCP [facility]  Juana Jacobson MD as Consulting Physician (Gastroenterology)    Medical History Review  Past Medical, Family, and Social History reviewed and does contribute to the patient presenting condition    Health Maintenance   Topic Date Due    COVID-19 Vaccine (1) Never done    DTaP/Tdap/Td vaccine (1 - Tdap) Never done    Shingles vaccine (1 of 2) Never done    Pneumococcal 65+ years Vaccine (1 - PCV) Never done    Depression Screen  07/21/2022    Annual Wellness Visit (AWV)  07/22/2022    Flu vaccine (Season Ended) 09/01/2022    Lipids  10/12/2022    Hepatitis A vaccine  Aged Out    Hepatitis B vaccine  Aged Out    Hib vaccine  Aged Out    Meningococcal (ACWY) vaccine  Aged Out       Assessment / Plan:      Diagnosis Orders   1. 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 Osteoarthritis of lumbosacral spine     Return in about 12 weeks (around 8/10/2022) for Follow Up, hypertension, osteoarthritis. No orders of the defined types were placed in this encounter. No orders of the defined types were placed in this encounter. Visit Information    Have you changed or started any medications since your last visit including any over-the-counter medicines, vitamins, or herbal medicines? no   Are you having any side effects from any of your medications? -  no  Have you stopped taking any of your medications? Is so, why? -  no    Have you seen any other physician or provider since your last visit? No  Have you had any other diagnostic tests since your last visit?  No  Have you been seen in the emergency room and/or had an admission to a hospital since we last saw you? No  Have you had your routine dental cleaning in the past 6 months? no    Have you activated your [employer] account? If not, what are your barriers?  No:      Patient Care Team:  Cruzito Yao MD as PCP - General (Internal Medicine)  Cruzito Yao MD as PCP - [facility]  Teresa Obrien MD as Consulting Physician (Gastroenterology)    Medical History Review  Past Medical, Family, and Social History reviewed and does not contribute to the patient presenting condition    Health Maintenance   Topic Date Due    COVID-19 Vaccine (1) Never done    DTaP/Tdap/Td vaccine (1 - Tdap) Never done    Shingles vaccine (1 of 2) Never done    Pneumococcal 65+ years Vaccine (1 - PCV) Never done    Depression Screen  07/21/2022    Annual Wellness Visit (AWV)  07/22/2022    Flu vaccine (Season Ended) 09/01/2022    Lipids  10/12/2022    Hepatitis A vaccine  Aged Out    Hepatitis B vaccine  Aged Out    Hib vaccine  Aged Out    Meningococcal (ACWY) vaccine  Aged Out

## 2022-06-15 DIAGNOSIS — M47.817 OSTEOARTHRITIS OF LUMBOSACRAL SPINE: ICD-10-CM

## 2022-06-15 DIAGNOSIS — M47.12 OSTEOARTHRITIS OF CERVICAL SPINE WITH MYELOPATHY: ICD-10-CM

## 2022-06-15 RX ORDER — HYDROCODONE BITARTRATE AND ACETAMINOPHEN 10; 325 MG/1; MG/1
1 TABLET ORAL EVERY 6 HOURS PRN
Qty: 120 TABLET | Refills: 0 | Status: SHIPPED | OUTPATIENT
Start: 2022-06-15 | End: 2022-07-14 | Stop reason: SDUPTHER

## 2022-06-29 ENCOUNTER — TELEPHONE (OUTPATIENT)
Dept: PHARMACY | Facility: CLINIC | Age: 82
End: 2022-06-29

## 2022-06-29 NOTE — TELEPHONE ENCOUNTER
[facility] CLINICAL PHARMACY REVIEW: ADHERENCE  Identified care gap per [employer]: fills at [facility]: ACE/ARB adherence    Last Visit: 05/18/2022    Patient also appears to be prescribed: simvastatin    ASSESSMENT  ACE/ARB ADHERENCE    Insurance Records claims through 06/14/2022 (VARGHESE Quiroz = Filled only once; Potential Fail Date: 07/03/2022): Lisinopril 20mg daily last filled on 01/27/2022 for 90 day supply. Next refill due: 04/27/2022    Per [place] Portal: same as above    BP Readings from Last 3 Encounters:   05/18/22 102/60   02/28/22 132/74   01/31/22 130/72     Estimated Creatinine Clearance: 68 mL/min (based on SCr of 0.81 mg/dL). Lab Results   Component Value Date    LABGLOM >60 02/28/2022     STATIN ADHERENCE    Insurance Records claims through 06/14/2022 ([place] = 100%; Potential Fail Date: 10/01/2022): Simvastatin 40mg daily last filled on 04/19/2022 for 90 day supply. Next refill due: 07/18/2022    Per [employer] Portal: same as above    Lab Results   Component Value Date    CHOL 144 10/12/2021    TRIG 128 10/12/2021    HDL 65 10/12/2021    LDLCHOLESTEROL 53 10/12/2021     ALT   Date Value Ref Range Status   12/23/2021 47 (H) 5 - 41 U/L Final     AST   Date Value Ref Range Status   12/23/2021 73 (H) <40 U/L Final     The ASCVD Risk score (Cornelius Zak., et al., 2013) failed to calculate for the following reasons: The 2013 ASCVD risk score is only valid for ages 36 to 78     PLAN  The following are interventions that have been identified:   - Patient overdue refilling lisinopril. Unsure if patient is still prescribed this medication. Per chart review, lisinopril was discontinued in January d/t elevated potassium. Verified with pharmacy that medication was no longer active in their system. No patient outreach needed.      Future Appointments   Date Time Provider Anna Srivastava   8/10/2022 11:15 AM Katty Carnes  [address], PharmD, [facility], [facility] [address] Pharmacist   [address]  Department, toll free: [phone], option 1    =======================================================    For Pharmacy Admin Tracking Only   Gap Closed?: Yes    Time Spent (min): 15

## 2022-07-14 DIAGNOSIS — M47.817 OSTEOARTHRITIS OF LUMBOSACRAL SPINE: ICD-10-CM

## 2022-07-14 DIAGNOSIS — M47.12 OSTEOARTHRITIS OF CERVICAL SPINE WITH MYELOPATHY: ICD-10-CM

## 2022-07-14 NOTE — TELEPHONE ENCOUNTER
----- Message from Norman [address] sent at 7/14/2022  9:37 AM EDT -----  Subject: Refill Request    QUESTIONS  Name of Medication? HYDROcodone-acetaminophen (NORCO)  MG per tablet  Patient-reported dosage and instructions? every day  How many days do you have left? 2  Preferred Pharmacy? [facility] [id number]  Pharmacy phone number (if available)? [phone]  ---------------------------------------------------------------------------  --------------  CALL BACK INFO  What is the best way for the office to contact you? OK to leave message on   voicemail  Preferred Call Back Phone Number? [phone]  ---------------------------------------------------------------------------  --------------  SCRIPT ANSWERS  Relationship to Patient? Other  Representative Name? wife  Is the Representative on the appropriate HIPAA document in Epic?  Yes

## 2022-07-15 RX ORDER — HYDROCODONE BITARTRATE AND ACETAMINOPHEN 10; 325 MG/1; MG/1
1 TABLET ORAL EVERY 6 HOURS PRN
Qty: 120 TABLET | Refills: 0 | Status: SHIPPED | OUTPATIENT
Start: 2022-07-15 | End: 2022-08-15 | Stop reason: SDUPTHER

## 2022-08-04 DIAGNOSIS — F17.200 TOBACCO DEPENDENCY: ICD-10-CM

## 2022-08-04 RX ORDER — FLUTICASONE PROPIONATE AND SALMETEROL 250; 50 UG/1; UG/1
POWDER RESPIRATORY (INHALATION)
Qty: 180 EACH | Refills: 3 | Status: SHIPPED | OUTPATIENT
Start: 2022-08-04

## 2022-08-10 ENCOUNTER — OFFICE VISIT (OUTPATIENT)
Dept: INTERNAL MEDICINE CLINIC | Age: 82
End: 2022-08-10
Payer: MEDICARE

## 2022-08-10 VITALS — DIASTOLIC BLOOD PRESSURE: 70 MMHG | SYSTOLIC BLOOD PRESSURE: 126 MMHG | WEIGHT: 189 LBS | BODY MASS INDEX: 32.44 KG/M2

## 2022-08-10 DIAGNOSIS — M47.12 OSTEOARTHRITIS OF CERVICAL SPINE WITH MYELOPATHY: ICD-10-CM

## 2022-08-10 DIAGNOSIS — R26.0 ATAXIA INVOLVING LEGS: ICD-10-CM

## 2022-08-10 DIAGNOSIS — U07.1 PNEUMONIA DUE TO COVID-19 VIRUS: ICD-10-CM

## 2022-08-10 DIAGNOSIS — M47.817 OSTEOARTHRITIS OF LUMBOSACRAL SPINE: ICD-10-CM

## 2022-08-10 DIAGNOSIS — I10 ESSENTIAL HYPERTENSION: Primary | ICD-10-CM

## 2022-08-10 DIAGNOSIS — J12.82 PNEUMONIA DUE TO COVID-19 VIRUS: ICD-10-CM

## 2022-08-10 PROBLEM — N17.9 AKI (ACUTE KIDNEY INJURY) (HCC): Status: RESOLVED | Noted: 2021-12-23 | Resolved: 2022-08-10

## 2022-08-10 PROCEDURE — G8427 DOCREV CUR MEDS BY ELIG CLIN: HCPCS | Performed by: INTERNAL MEDICINE

## 2022-08-10 PROCEDURE — 1123F ACP DISCUSS/DSCN MKR DOCD: CPT | Performed by: INTERNAL MEDICINE

## 2022-08-10 PROCEDURE — 1036F TOBACCO NON-USER: CPT | Performed by: INTERNAL MEDICINE

## 2022-08-10 PROCEDURE — G8417 CALC BMI ABV UP PARAM F/U: HCPCS | Performed by: INTERNAL MEDICINE

## 2022-08-10 PROCEDURE — 99214 OFFICE O/P EST MOD 30 MIN: CPT | Performed by: INTERNAL MEDICINE

## 2022-08-10 SDOH — ECONOMIC STABILITY: FOOD INSECURITY: WITHIN THE PAST 12 MONTHS, THE FOOD YOU BOUGHT JUST DIDN'T LAST AND YOU DIDN'T HAVE MONEY TO GET MORE.: NEVER TRUE

## 2022-08-10 SDOH — ECONOMIC STABILITY: FOOD INSECURITY: WITHIN THE PAST 12 MONTHS, YOU WORRIED THAT YOUR FOOD WOULD RUN OUT BEFORE YOU GOT MONEY TO BUY MORE.: NEVER TRUE

## 2022-08-10 ASSESSMENT — SOCIAL DETERMINANTS OF HEALTH (SDOH): HOW HARD IS IT FOR YOU TO PAY FOR THE VERY BASICS LIKE FOOD, HOUSING, MEDICAL CARE, AND HEATING?: NOT HARD AT ALL

## 2022-08-10 ASSESSMENT — PATIENT HEALTH QUESTIONNAIRE - PHQ9
SUM OF ALL RESPONSES TO PHQ QUESTIONS 1-9: 0
2. FEELING DOWN, DEPRESSED OR HOPELESS: 0
SUM OF ALL RESPONSES TO PHQ QUESTIONS 1-9: 0
1. LITTLE INTEREST OR PLEASURE IN DOING THINGS: 0
SUM OF ALL RESPONSES TO PHQ9 QUESTIONS 1 & 2: 0

## 2022-08-10 ASSESSMENT — ENCOUNTER SYMPTOMS
EYES NEGATIVE: 1
GASTROINTESTINAL NEGATIVE: 1
COUGH: 0
BACK PAIN: 1

## 2022-08-10 ASSESSMENT — LIFESTYLE VARIABLES
HOW MANY STANDARD DRINKS CONTAINING ALCOHOL DO YOU HAVE ON A TYPICAL DAY: 1 OR 2
HOW OFTEN DO YOU HAVE A DRINK CONTAINING ALCOHOL: MONTHLY OR LESS

## 2022-08-10 NOTE — PROGRESS NOTES
Subjective:      Patient ID: Rambo Henderson is a 80 y.o. male. Continues to have cervical pain due to cervical osteoarthritis. He also has lumbosacral osteoarthritis he is on narcotics plus gabapentin. I will do my best to reduce the dosage  This is a special visit after recent hospitalization. Admitted to the hospital with DANIELLE and COVID-19 pneumonia he was volume depleted and also developed COVID-19 respiratory symptoms his clinical condition is improved. He continues to have ataxia which is most likely due to cervical lumbosacral osteoarthritis. He has been on long-term narcotic therapy for the. Today he has persistent cough but no sputum production. States that he does not have dyspnea on exertion  The patient was found to have a high potassium of 6.1, he was taken off ACE inhibitor and his potassium came down to 5.9 he was advised that he should drink water today his blood pressure is 132/70 and he is asymptomatic. I will repeat his BMP today  Today on February 27 he is symptomatic with persistent backache and neck pain he is known to have advanced osteoarthritis involving cervical and lumbar spine and requires narcotic therapy. He denied any other symptoms  Today on May 18, 2022 he is here for follow-up of hypertension, advanced osteoarthritis involving multiple joints especially cervical spine. Is to be noted that he does have  ataxia due to cervical osteoarthritis. He is also here for follow-up of hypertensio  Today and August 10, 2022 he is symptomatic with persistent pain and is requesting to up to 6 pills of Norco per day. He denied any cardiorespiratory symptoms    Hypertension  This is a chronic problem. The current episode started more than 1 year ago. The problem is unchanged. The problem is controlled. Associated symptoms include anxiety and neck pain. There are no associated agents to hypertension. Risk factors for coronary artery disease include dyslipidemia and obesity.  Past treatments include ACE inhibitors. The current treatment provides moderate improvement. Review of Systems   Constitutional: Negative. HENT: Negative. Eyes: Negative. Respiratory:  Negative for cough. Cardiovascular: Negative. Gastrointestinal: Negative. Endocrine: Negative. Genitourinary: Negative. Musculoskeletal:  Positive for back pain and neck pain. Objective:   Physical Exam    Assessment / Plan:      Diagnosis Orders   1. Essential hypertension     Controlled and he has fully recovered from recent DANIELLE   2. Pneumonia due to COVID-19 virus = no symptoms at present       3. Osteoarthritis of cervical spine with myelopathy = he meant to take more pain pills I advised him considering his age he is not a candidate for more than 4 Norco's per day       4. Osteoarthritis of lumbosacral spine        5. Ataxia involving legs = no change in his balance       Return in about 12 weeks (around 11/2/2022) for Follow Up. No orders of the defined types were placed in this encounter. No orders of the defined types were placed in this encounter. Visit Information    Have you changed or started any medications since your last visit including any over-the-counter medicines, vitamins, or herbal medicines? no   Are you having any side effects from any of your medications? -  no  Have you stopped taking any of your medications? Is so, why? -  no    Have you seen any other physician or provider since your last visit? No  Have you had any other diagnostic tests since your last visit? No  Have you been seen in the emergency room and/or had an admission to a hospital since we last saw you? No  Have you had your routine dental cleaning in the past 6 months? no    Have you activated your [employer] account? If not, what are your barriers?  No:      Patient Care Team:  Yulia Calderón MD as PCP - General (Internal Medicine)  Yulia Calderón MD as PCP - [facility] Empaneled Provider  Mariely Zarco MD as Consulting Physician (Gastroenterology)    Medical History Review  Past Medical, Family, and Social History reviewed and does not contribute to the patient presenting condition    Health Maintenance   Topic Date Due    COVID-19 Vaccine (1) Never done    DTaP/Tdap/Td vaccine (1 - Tdap) Never done    Shingles vaccine (1 of 2) Never done    Pneumococcal 65+ years Vaccine (1 - PCV) Never done    Depression Screen  07/21/2022    Annual Wellness Visit (AWV)  07/22/2022    Flu vaccine (1) 09/01/2022    Lipids  10/12/2022    Hepatitis A vaccine  Aged Out    Hepatitis B vaccine  Aged Out    Hib vaccine  Aged Out    Meningococcal (ACWY) vaccine  Aged Out

## 2022-08-15 DIAGNOSIS — M47.12 OSTEOARTHRITIS OF CERVICAL SPINE WITH MYELOPATHY: ICD-10-CM

## 2022-08-15 DIAGNOSIS — M47.817 OSTEOARTHRITIS OF LUMBOSACRAL SPINE: ICD-10-CM

## 2022-08-15 RX ORDER — HYDROCODONE BITARTRATE AND ACETAMINOPHEN 10; 325 MG/1; MG/1
1 TABLET ORAL EVERY 6 HOURS PRN
Qty: 120 TABLET | Refills: 0 | Status: SHIPPED | OUTPATIENT
Start: 2022-08-15 | End: 2022-09-14 | Stop reason: SDUPTHER

## 2022-08-15 NOTE — TELEPHONE ENCOUNTER
----- Message from Adin Valentine sent at 8/15/2022  9:26 AM EDT -----  Subject: Refill Request    QUESTIONS  Name of Medication? HYDROcodone-acetaminophen (NORCO)  MG per tablet  Patient-reported dosage and instructions? Take 1 tablet by mouth every 6   hours as needed for Pain for up to 30 days. How many days do you have left? 0  Preferred Pharmacy? [facility] [id number]  Pharmacy phone number (if available)? [phone]  ---------------------------------------------------------------------------  --------------  CALL BACK INFO  What is the best way for the office to contact you? OK to leave message on   voicemail  Preferred Call Back Phone Number? [phone]  ---------------------------------------------------------------------------  --------------  SCRIPT ANSWERS  Relationship to Patient? Other  Representative Name? Jorge Delgado  Is the Representative on the appropriate HIPAA document in Epic?  Yes

## 2022-09-14 DIAGNOSIS — M47.817 OSTEOARTHRITIS OF LUMBOSACRAL SPINE: ICD-10-CM

## 2022-09-14 DIAGNOSIS — M47.12 OSTEOARTHRITIS OF CERVICAL SPINE WITH MYELOPATHY: ICD-10-CM

## 2022-09-14 RX ORDER — HYDROCODONE BITARTRATE AND ACETAMINOPHEN 10; 325 MG/1; MG/1
1 TABLET ORAL EVERY 6 HOURS PRN
Qty: 120 TABLET | Refills: 0 | Status: SHIPPED | OUTPATIENT
Start: 2022-09-14 | End: 2022-09-15 | Stop reason: SDUPTHER

## 2022-09-14 NOTE — TELEPHONE ENCOUNTER
Please advise refill request    Medication Requested: Hydrocodone-acetaminophen ([address]) 10-325mg     Last visit: 08/10/2022  Next visit: 2022  Last refill: 08/15/2022          Consistent with medication(s):    [x] yes   [] no        Quantity of medication remainin day     Who will be picking rx up: Patient     Pharmacy if escribed: [facility] Not applicable

## 2022-09-14 NOTE — TELEPHONE ENCOUNTER
----- Message from Helene Griffin sent at 9/14/2022  8:38 AM EDT -----  Subject: Refill Request    QUESTIONS  Name of Medication? HYDROcodone-acetaminophen (NORCO)  MG per tablet  Patient-reported dosage and instructions? as directed by doctor  How many days do you have left? 0  Preferred Pharmacy? [facility] [id number]  Pharmacy phone number (if available)? [phone]  ---------------------------------------------------------------------------  --------------  CALL BACK INFO  What is the best way for the office to contact you? OK to leave message on   voicemail  Preferred Call Back Phone Number? [phone]  ---------------------------------------------------------------------------  --------------  SCRIPT ANSWERS  Relationship to Patient? Other  Representative Name? Fabi French wife  Is the Representative on the appropriate HIPAA document in Epic?  Yes

## 2022-09-15 DIAGNOSIS — M47.817 OSTEOARTHRITIS OF LUMBOSACRAL SPINE: ICD-10-CM

## 2022-09-15 DIAGNOSIS — M47.12 OSTEOARTHRITIS OF CERVICAL SPINE WITH MYELOPATHY: ICD-10-CM

## 2022-09-15 RX ORDER — HYDROCODONE BITARTRATE AND ACETAMINOPHEN 10; 325 MG/1; MG/1
1 TABLET ORAL EVERY 6 HOURS PRN
Qty: 120 TABLET | Refills: 0 | Status: SHIPPED | OUTPATIENT
Start: 2022-09-15 | End: 2022-10-17 | Stop reason: SDUPTHER

## 2022-09-15 NOTE — TELEPHONE ENCOUNTER
Rx sent to the wrong pharmacy yesterday. Rx pending to [facility]. Patient wife will call [facility] to cancel. Patient is out of medication.

## 2022-10-10 ENCOUNTER — OFFICE VISIT (OUTPATIENT)
Dept: ORTHOPEDIC SURGERY | Age: 82
End: 2022-10-10
Payer: MEDICARE

## 2022-10-10 VITALS — RESPIRATION RATE: 16 BRPM | WEIGHT: 189 LBS | HEIGHT: 64 IN | BODY MASS INDEX: 32.27 KG/M2

## 2022-10-10 DIAGNOSIS — M79.642 LEFT HAND PAIN: Primary | ICD-10-CM

## 2022-10-10 PROCEDURE — G8417 CALC BMI ABV UP PARAM F/U: HCPCS | Performed by: ORTHOPAEDIC SURGERY

## 2022-10-10 PROCEDURE — G8484 FLU IMMUNIZE NO ADMIN: HCPCS | Performed by: ORTHOPAEDIC SURGERY

## 2022-10-10 PROCEDURE — 1123F ACP DISCUSS/DSCN MKR DOCD: CPT | Performed by: ORTHOPAEDIC SURGERY

## 2022-10-10 PROCEDURE — 99203 OFFICE O/P NEW LOW 30 MIN: CPT | Performed by: ORTHOPAEDIC SURGERY

## 2022-10-10 PROCEDURE — 1036F TOBACCO NON-USER: CPT | Performed by: ORTHOPAEDIC SURGERY

## 2022-10-10 PROCEDURE — G8427 DOCREV CUR MEDS BY ELIG CLIN: HCPCS | Performed by: ORTHOPAEDIC SURGERY

## 2022-10-10 NOTE — LETTER
10/10/2022    Lynnae Prader, [address] [place] [address] [place] [address],  [address] [place] [address]    RE: [place]    Dear Dr. James Varghese,    Thank you for allowing me to participate in the care of Mr. Pearson Hammans. I had the opportunity to evaluate the patient on 10/10/2022. Attached you will find my evaluation and recommendations. Thanks again for the confidence you have expressed in me by allowing my participation in the care of your patient. I will keep you apprised of further developments in the patients treatment course as it progresses. If I can be of further assistance in any fashion, please feel free to contact me at your convenience.     Sincerely,         Flakita Em  Shoulder and Elbow Surgery

## 2022-10-10 NOTE — PROGRESS NOTES
ORTHOPEDIC PATIENT EVALUATION      HPI / Chief Complaint  Alisa Avelar is a 80 y.o. male who presents for evaluation of his left hand. He has been having pain and some associated swelling for over 3 years now. No precipitating trauma or injury. His pain is primarily localized to the radial aspect of the hand specifically involving the thumb occasionally extending into the index finger. He denies having any associated numbness or tingling but does report having some swelling. He has not attempted any treatment for this other than using over-the-counter topical lidocaine. Past Medical History  Naif Barcenas  has a past medical history of Arthritis, Asthma, Hyperlipemia, and Hypertension. Past Surgical History  Naif Barcenas  has a past surgical history that includes Total knee arthroplasty. Current Medications  Current Outpatient Medications   Medication Sig Dispense Refill    HYDROcodone-acetaminophen (NORCO)  MG per tablet Take 1 tablet by mouth every 6 hours as needed for Pain for up to 30 days. 120 tablet 0    WIXELA INHUB 250-50 MCG/ACT AEPB diskus inhaler USE 1 INHALATION BY MOUTH  TWICE DAILY 180 each 3    albuterol sulfate  (90 Base) MCG/ACT inhaler USE 2 INHALATIONS BY MOUTH  4 TIMES DAILY 34 g 3    simvastatin (ZOCOR) 40 MG tablet TAKE 1 TABLET BY MOUTH AT  NIGHT 90 tablet 3    gabapentin (NEURONTIN) 300 MG capsule Take 1 capsule by mouth 2 times daily for 180 days. Intended supply: 90 days 180 capsule 1    albuterol (ACCUNEB) 1.25 MG/3ML nebulizer solution Inhale 1 ampule into the lungs every 6 hours as needed       No current facility-administered medications for this visit. Allergies  Allergies have been reviewed. Naif Barcenas has No Known Allergies. Social History  Naif Barcenas  reports that he quit smoking about 36 years ago. His smoking use included cigarettes. He has a 30.00 pack-year smoking history.  He has never used smokeless tobacco. He reports current alcohol use of about 1.0 standard drink per week. He reports that he does not use drugs. Family History  Nelson's family history is not on file. Review of Systems   History obtained from the patient. REVIEW OF SYSTEMS:   Constitution: negative for fever, chills, weight loss or malaise   Musculoskeletal: As noted in the HPI   Neurologic: As noted in the HPI    Physical Exam  Resp 16   Ht 5' 4\" (1.626 m)   Wt 189 lb (85.7 kg)   BMI 32.44 kg/m²    General Appearance: alert, well appearing, and in no distress  Mental Status: alert, oriented to person, place, and time  Evaluation of the patient's left hand and upper extremity demonstrates intact skin without warmth or erythema. He does have some mild swelling at the base of the thumb and he has a longitudinal scar along the thenar eminence radially. He is tender to palpation at the level of the first [facility] joint and has a positive first [facility] grind test.  Sensation is grossly intact light touch in all dermatomes and he has a 2+ radial pulse with brisk capillary refill in his fingers. Negative Phalen's test and has a negative Tinel's at the carpal tunnel. Imaging Studies  3 x-ray views of the left wrist completed on 10/10/2022 were reviewed independently demonstrating complete first [facility] joint space loss. No obvious fracture, dislocation or subluxation. Assessment and Plan  Inocente Crigler is a 80 y.o. old male left first [facility] joint osteoarthritis. I had a discussion with the patient educating him about this condition and discussed treatment options available to them including nonoperative and operative intervention. I did recommend proceeding conservatively at this time. We discussed use of anti-inflammatories i.e. topical and oral versus cortisone injections as well as use of resting brace. At this time he has decided to not move ahead with any of our recommendations indicating that he just wanted to get a sense of his diagnosis or the cause of his pain.   Now that he knows what it is he will continue to manage it symptomatically as he has done thus far. I will see him back in my clinic as needed but he may return or call at anytime with questions or concerns. This note is created with the assistance of a speech recognition program.  While intending to generate adocument that actually reflects the content of the visit, the document can still have some errors including those of syntax and sound a like substitutions which may escape proof reading. It such instances, actual meaningcan be extrapolated by contextual diversion.

## 2022-10-13 DIAGNOSIS — M47.12 OSTEOARTHRITIS OF CERVICAL SPINE WITH MYELOPATHY: ICD-10-CM

## 2022-10-13 DIAGNOSIS — M47.817 OSTEOARTHRITIS OF LUMBOSACRAL SPINE: ICD-10-CM

## 2022-10-13 NOTE — TELEPHONE ENCOUNTER
----- Message from Jacob Valle sent at 10/13/2022  9:40 AM EDT -----  Subject: Refill Request    QUESTIONS  Name of Medication? HYDROcodone-acetaminophen (NORCO)  MG per tablet  Patient-reported dosage and instructions? Patient takes 1 tablet every 6   hours as needed  How many days do you have left? 2  Preferred Pharmacy? [facility] [id number]  Pharmacy phone number (if available)? [phone]  ---------------------------------------------------------------------------  --------------  CALL BACK INFO  What is the best way for the office to contact you? OK to leave message on   voicemail  Preferred Call Back Phone Number? [phone]  ---------------------------------------------------------------------------  --------------  SCRIPT ANSWERS  Relationship to Patient?  Self

## 2022-10-17 RX ORDER — HYDROCODONE BITARTRATE AND ACETAMINOPHEN 10; 325 MG/1; MG/1
1 TABLET ORAL EVERY 6 HOURS PRN
Qty: 120 TABLET | Refills: 0 | Status: SHIPPED | OUTPATIENT
Start: 2022-10-17 | End: 2022-11-16

## 2022-10-17 NOTE — TELEPHONE ENCOUNTER
Patient wife calling for an update, patient is very unhappy and states he is in pain. Informed them we are waiting on Dr. Ricky Kang to sign the Rx and informed them he will be in the office this afternoon.

## 2022-11-09 ENCOUNTER — OFFICE VISIT (OUTPATIENT)
Dept: INTERNAL MEDICINE CLINIC | Age: 82
End: 2022-11-09
Payer: MEDICARE

## 2022-11-09 VITALS — DIASTOLIC BLOOD PRESSURE: 78 MMHG | WEIGHT: 190 LBS | SYSTOLIC BLOOD PRESSURE: 136 MMHG | BODY MASS INDEX: 32.61 KG/M2

## 2022-11-09 DIAGNOSIS — Z71.89 ACP (ADVANCE CARE PLANNING): ICD-10-CM

## 2022-11-09 DIAGNOSIS — M47.817 OSTEOARTHRITIS OF LUMBOSACRAL SPINE: ICD-10-CM

## 2022-11-09 DIAGNOSIS — R26.0 ATAXIA INVOLVING LEGS: ICD-10-CM

## 2022-11-09 DIAGNOSIS — Z00.00 MEDICARE ANNUAL WELLNESS VISIT, SUBSEQUENT: Primary | ICD-10-CM

## 2022-11-09 DIAGNOSIS — M47.12 OSTEOARTHRITIS OF CERVICAL SPINE WITH MYELOPATHY: ICD-10-CM

## 2022-11-09 DIAGNOSIS — I10 ESSENTIAL HYPERTENSION: ICD-10-CM

## 2022-11-09 DIAGNOSIS — I10 PRIMARY HYPERTENSION: ICD-10-CM

## 2022-11-09 DIAGNOSIS — F11.99 OPIOID USE, UNSPECIFIED WITH UNSPECIFIED OPIOID-INDUCED DISORDER (HCC): ICD-10-CM

## 2022-11-09 PROCEDURE — 3074F SYST BP LT 130 MM HG: CPT | Performed by: INTERNAL MEDICINE

## 2022-11-09 PROCEDURE — 99497 ADVNCD CARE PLAN 30 MIN: CPT | Performed by: INTERNAL MEDICINE

## 2022-11-09 PROCEDURE — G0447 BEHAVIOR COUNSEL OBESITY 15M: HCPCS | Performed by: INTERNAL MEDICINE

## 2022-11-09 PROCEDURE — G8484 FLU IMMUNIZE NO ADMIN: HCPCS | Performed by: INTERNAL MEDICINE

## 2022-11-09 PROCEDURE — 99214 OFFICE O/P EST MOD 30 MIN: CPT | Performed by: INTERNAL MEDICINE

## 2022-11-09 PROCEDURE — G0439 PPPS, SUBSEQ VISIT: HCPCS | Performed by: INTERNAL MEDICINE

## 2022-11-09 PROCEDURE — 1036F TOBACCO NON-USER: CPT | Performed by: INTERNAL MEDICINE

## 2022-11-09 PROCEDURE — G8417 CALC BMI ABV UP PARAM F/U: HCPCS | Performed by: INTERNAL MEDICINE

## 2022-11-09 PROCEDURE — 3078F DIAST BP <80 MM HG: CPT | Performed by: INTERNAL MEDICINE

## 2022-11-09 PROCEDURE — G8427 DOCREV CUR MEDS BY ELIG CLIN: HCPCS | Performed by: INTERNAL MEDICINE

## 2022-11-09 PROCEDURE — 1123F ACP DISCUSS/DSCN MKR DOCD: CPT | Performed by: INTERNAL MEDICINE

## 2022-11-09 RX ORDER — HYDROCODONE BITARTRATE AND ACETAMINOPHEN 10; 325 MG/1; MG/1
1 TABLET ORAL EVERY 6 HOURS PRN
Qty: 120 TABLET | Refills: 0 | Status: SHIPPED | OUTPATIENT
Start: 2022-11-09 | End: 2022-12-09

## 2022-11-09 ASSESSMENT — PATIENT HEALTH QUESTIONNAIRE - PHQ9
SUM OF ALL RESPONSES TO PHQ QUESTIONS 1-9: 0
SUM OF ALL RESPONSES TO PHQ9 QUESTIONS 1 & 2: 0
SUM OF ALL RESPONSES TO PHQ QUESTIONS 1-9: 0
SUM OF ALL RESPONSES TO PHQ QUESTIONS 1-9: 0
1. LITTLE INTEREST OR PLEASURE IN DOING THINGS: 0
2. FEELING DOWN, DEPRESSED OR HOPELESS: 0
SUM OF ALL RESPONSES TO PHQ QUESTIONS 1-9: 0

## 2022-11-09 ASSESSMENT — LIFESTYLE VARIABLES
HOW OFTEN DO YOU HAVE A DRINK CONTAINING ALCOHOL: NEVER
HOW MANY STANDARD DRINKS CONTAINING ALCOHOL DO YOU HAVE ON A TYPICAL DAY: PATIENT DOES NOT DRINK

## 2022-11-09 ASSESSMENT — ENCOUNTER SYMPTOMS
BACK PAIN: 1
COUGH: 0
EYES NEGATIVE: 1
GASTROINTESTINAL NEGATIVE: 1

## 2022-11-09 NOTE — PROGRESS NOTES
Subjective:      Patient ID: Yesenia Roman is a 80 y.o. male. Continues to have cervical pain due to cervical osteoarthritis. He also has lumbosacral osteoarthritis he is on narcotics plus gabapentin. I will do my best to reduce the dosage  This is a special visit after recent hospitalization. Admitted to the hospital with DANIELLE and COVID-19 pneumonia he was volume depleted and also developed COVID-19 respiratory symptoms his clinical condition is improved. He continues to have ataxia which is most likely due to cervical lumbosacral osteoarthritis. He has been on long-term narcotic therapy for the. Today he has persistent cough but no sputum production. States that he does not have dyspnea on exertion  The patient was found to have a high potassium of 6.1, he was taken off ACE inhibitor and his potassium came down to 5.9 he was advised that he should drink water today his blood pressure is 132/70 and he is asymptomatic. I will repeat his BMP today  Today on February 27 he is symptomatic with persistent backache and neck pain he is known to have advanced osteoarthritis involving cervical and lumbar spine and requires narcotic therapy. He denied any other symptoms  Today on May 18, 2022 he is here for follow-up of hypertension, advanced osteoarthritis involving multiple joints especially cervical spine. Is to be noted that he does have  ataxia due to cervical osteoarthritis. He is also here for follow-up of hypertensio  Today and August 10, 2022 he is symptomatic with persistent pain and is requesting to up to 6 pills of Norco per day. He denied any cardiorespiratory symptoms  Today on November 9, 2022 he is symptomatic with persistent pain in his multiple joints especially lumbosacral region. He is denied any cardiorespiratory symptoms and his blood pressure is well controlled.   He is here for follow-up of essential hypertension, advanced lumbosacral and cervical osteoarthritis and ataxia secondary to extensive osteoarthritis    Hypertension  This is a chronic problem. The current episode started more than 1 year ago. The problem is unchanged. The problem is controlled. Associated symptoms include anxiety and neck pain. There are no associated agents to hypertension. Risk factors for coronary artery disease include dyslipidemia and obesity. Past treatments include ACE inhibitors. The current treatment provides moderate improvement. Review of Systems   Constitutional: Negative. HENT: Negative. Eyes: Negative. Respiratory:  Negative for cough. Cardiovascular: Negative. Gastrointestinal: Negative. Endocrine: Negative. Genitourinary: Negative. Musculoskeletal:  Positive for back pain and neck pain. Objective:   Physical Exam  Constitutional:       Appearance: Normal appearance. Neck:      Vascular: No carotid bruit. Cardiovascular:      Rate and Rhythm: Regular rhythm. Heart sounds: No murmur heard. No gallop. Pulmonary:      Breath sounds: No wheezing or rhonchi. Abdominal:      General: There is no distension. Palpations: There is no mass. Tenderness: There is no abdominal tenderness. Hernia: No hernia is present. Musculoskeletal:         General: Deformity present. Comments: Extensive osteoarthritis of lumbar and cervical spine   Lymphadenopathy:      Cervical: No cervical adenopathy. Skin:     General: Skin is warm and dry. Coloration: Skin is not jaundiced or pale. Findings: No bruising, erythema, lesion or rash. Neurological:      General: No focal deficit present. Mental Status: He is alert and oriented to person, place, and time. Assessment / Plan:      Diagnosis Orders   1. Essential hypertension = controlled       2. Osteoarthritis of cervical spine with myelopathy= persistent pain       3. Osteoarthritis of lumbosacral spine        4.  Ataxia involving legs        Return in about 12 weeks (around 2/1/2023) for Follow Up. No orders of the defined types were placed in this encounter. No orders of the defined types were placed in this encounter. Is current with his vaccination he is ambulation is poor. He has normal hearing and vision. His memory is intact      Visit Information    Have you changed or started any medications since your last visit including any over-the-counter medicines, vitamins, or herbal medicines? no   Are you having any side effects from any of your medications? -  no  Have you stopped taking any of your medications? Is so, why? -  no    Have you seen any other physician or provider since your last visit? No  Have you had any other diagnostic tests since your last visit? No  Have you been seen in the emergency room and/or had an admission to a hospital since we last saw you? No  Have you had your routine dental cleaning in the past 6 months? no    Have you activated your [employer] account? If not, what are your barriers?  No:      Patient Care Team:  Gilmar Hall MD as PCP - General (Internal Medicine)  Gilmar Hall MD [facility]  Chelsey Walker MD as Consulting Physician (Gastroenterology)    Medical History Review  Past Medical, Family, and Social History reviewed and does not contribute to the patient presenting condition    Health Maintenance   Topic Date Due    COVID-19 Vaccine (1) Never done    DTaP/Tdap/Td vaccine (1 - Tdap) Never done    Shingles vaccine (1 of 2) Never done    Pneumococcal 65+ years Vaccine (1 - PCV) Never done    Annual Wellness Visit (AWV)  07/22/2022    Flu vaccine (1) Never done    Lipids  10/12/2022    Depression Screen  08/10/2023    Hepatitis A vaccine  Aged Out    Hib vaccine  Aged Out    Meningococcal (ACWY) vaccine  Aged Out         Medicare Annual Wellness Visit    Stan Peace is here for [facility] AWV    Assessment & Plan   Medicare annual wellness visit, subsequent  -     NM Behavior  obesity 15m []  Essential hypertension  Osteoarthritis of cervical spine with myelopathy  -     HYDROcodone-acetaminophen (NORCO)  MG per tablet; Take 1 tablet by mouth every 6 hours as needed for Pain for up to 30 days. , Disp-120 tablet, R-0Print  Osteoarthritis of lumbosacral spine  -     HYDROcodone-acetaminophen (NORCO)  MG per tablet; Take 1 tablet by mouth every 6 hours as needed for Pain for up to 30 days. , Disp-120 tablet, R-0Print  Ataxia involving legs  Opioid use, unspecified with unspecified opioid-induced disorder  Body mass index (BMI) 30.0-30.9, adult   -     SD Behavior  obesity 15m []  ACP (advance care planning)  -     SD ADVANCED CARE PLAN FACE TO FACE, EA GIOVANY'L 30 MIN [19780]  Primary hypertension  -     HYDROcodone-acetaminophen ([address])  MG per tablet; Take 1 tablet by mouth every 6 hours as needed for Pain for up to 30 days. , Disp-120 tablet, R-0Print    Recommendations for Preventive Services Due: see orders and patient instructions/AVS.  Recommended screening schedule for the next 5-10 years is provided to the patient in written form: see Patient Instructions/AVS.     Return in 12 weeks (on 2/1/2023) for Medicare Annual Wellness Visit in 1 year. Subjective       Patient's complete Health Risk Assessment and screening values have been reviewed and are found in Flowsheets. The following problems were reviewed today and where indicated follow up appointments were made and/or referrals ordered. Positive Risk Factor Screenings with Interventions:    Fall Risk:  Do you feel unsteady or are you worried about falling? : (!) yes  2 or more falls in past year?: no  Fall with injury in past year?: no   Fall Risk Interventions:                Opioid Risk: (Low risk score <55) Opioid risk score: 11    Patient is low risk for opioid use disorder or overdose.   Last PDMP [place] as Reviewed:  Review User Review Instant Review Result   Matthew Becerril 4/18/2022  8:49 AM     Reviewed PDMP [1] General Health and ACP:  General  In general, how would you say your health is?: Fair  In the past 7 days, have you experienced any of the following: New or Increased Pain, New or Increased Fatigue, Loneliness, Social Isolation, Stress or Anger?: No  Do you get the social and emotional support that you need?: Yes  Do you have a Living Will?: (!) No    Advance Directives       Power of Lora Rob [place] Will ACP-Advance Directive ACP-Power of     Not on File Not on File Not on File Not on File          General Health Risk Interventions:      Health Habits/Nutrition:  Physical Activity: Unknown    Days of Exercise per Week: 0 days    Minutes of Exercise per Session: Not on file     Have you lost any weight without trying in the past 3 months?: No     Have you seen the dentist within the past year?: (!) No  Health Habits/Nutrition Interventions:      Hearing/Vision:  Do you or your family notice any trouble with your hearing that hasn't been managed with hearing aids?: (!) Yes  Do you have difficulty driving, watching TV, or doing any of your daily activities because of your eyesight?: No  Have you had an eye exam within the past year?: Yes  No results found. Hearing/Vision Interventions:              Objective   Vitals:    11/09/22 1048   BP: 136/78   Site: Left Upper Arm   Weight: 190 lb (86.2 kg)      Body mass index is 32.61 kg/m². No Known Allergies  Prior to Visit Medications    Medication Sig Taking? Authorizing Provider   HYDROcodone-acetaminophen (NORCO)  MG per tablet Take 1 tablet by mouth every 6 hours as needed for Pain for up to 30 days.  Yes Maegan Ambrosio MD   WIXELA INHUB 250-50 MCG/ACT AEPB diskus inhaler USE 1 INHALATION BY MOUTH  TWICE DAILY Yes Maegan Ambrosio MD   albuterol sulfate  (90 Base) MCG/ACT inhaler USE 2 INHALATIONS BY MOUTH  4 TIMES DAILY Yes Pat Vargas MD   simvastatin (ZOCOR) 40 MG tablet TAKE 1 TABLET BY MOUTH AT  NIGHT Yes Marva Randolph MD   albuterol (ACCUNEB) 1.25 MG/3ML nebulizer solution Inhale 1 ampule into the lungs every 6 hours as needed Yes Historical Provider, MD   gabapentin (NEURONTIN) 300 MG capsule Take 1 capsule by mouth 2 times daily for 180 days. Intended supply: 90 days  MD Jose Armando Chapa (Including outside providers/suppliers regularly involved in providing care):   Patient Care Team:  Geno Das MD as PCP - General (Internal Medicine)  Geno Das [facility]  Emma Wallace MD as Consulting Physician (Gastroenterology)     Reviewed and updated this visit:  Allergies  Meds  Problems                Cardiovascular Disease Risk Counseling: Assessed the patient's risk to develop cardiovascular disease and reviewed main risk factors. Reviewed steps to reduce disease risk including:   Quitting tobacco use, reducing amount smoked, or not starting the habit  Making healthy food choices  Being physically active and gradualy increasing activity levels   Reduce weight and determine a healthy BMI goal  Monitor blood pressure and treat if higher than 140/90 mmHg  Maintain blood total cholesterol levels under 5 mmol/l or 190 mg/dl  Maintain LDL cholesterol levels under 3.0 mmol/l or 115 mg/dl   Control blood glucose levels  Consider taking aspirin (75 mg daily), once blood pressure is controlled   Provided a follow up plan. Time spent (minutes):   His medication and laboratory work-up was reviewed, he is current with his vaccination program and has normal memory and vision.   He is a high risk for fall and he was advised to follow protocols to prevent falls

## 2022-11-19 DIAGNOSIS — M47.12 OSTEOARTHRITIS OF CERVICAL SPINE WITH MYELOPATHY: ICD-10-CM

## 2022-11-21 RX ORDER — GABAPENTIN 300 MG/1
300 CAPSULE ORAL 2 TIMES DAILY
Qty: 180 CAPSULE | Refills: 3 | Status: SHIPPED | OUTPATIENT
Start: 2022-11-21 | End: 2023-01-20

## 2022-11-23 DIAGNOSIS — M47.12 OSTEOARTHRITIS OF CERVICAL SPINE WITH MYELOPATHY: ICD-10-CM

## 2022-11-23 RX ORDER — GABAPENTIN 300 MG/1
CAPSULE ORAL
Qty: 180 CAPSULE | Refills: 3 | OUTPATIENT
Start: 2022-11-23

## 2022-11-25 ENCOUNTER — TELEPHONE (OUTPATIENT)
Dept: INTERNAL MEDICINE CLINIC | Age: 82
End: 2022-11-25

## 2022-11-25 DIAGNOSIS — M47.817 OSTEOARTHRITIS OF LUMBOSACRAL SPINE: Primary | ICD-10-CM

## 2022-11-25 NOTE — TELEPHONE ENCOUNTER
Wife of pt calling to request refill for patients 400 mg of Gabapentin. Pt is  Jarrod GARNETT 1940  Pt of [address] [facility] [place]. Per wife, Lizzette Gardiner, the script for 300 mg was received but not the script for 400 mg. Patient takes the 400 mg at night. Wife states she will follow up with office on Monday. Please sent script to mail in pharmacy in chart.   EM

## 2022-11-28 NOTE — TELEPHONE ENCOUNTER
Please advise if both prescriptions are appropriate. Will need to send Gabapentin 400mg to pharmacy if approved.

## 2022-11-29 DIAGNOSIS — Z13.220 SCREENING FOR HYPERLIPIDEMIA: ICD-10-CM

## 2022-11-30 RX ORDER — SIMVASTATIN 40 MG
40 TABLET ORAL NIGHTLY
Qty: 30 TABLET | Refills: 2 | Status: SHIPPED | OUTPATIENT
Start: 2022-11-30 | End: 2023-02-28

## 2022-12-01 RX ORDER — GABAPENTIN 400 MG/1
400 CAPSULE ORAL EVERY EVENING
Qty: 90 CAPSULE | Refills: 3 | Status: SHIPPED | OUTPATIENT
Start: 2022-12-01 | End: 2023-11-26

## 2022-12-01 NOTE — TELEPHONE ENCOUNTER
Spoke with patients wife to clarify the dose. She stated it was discussed at the last office visit that the change would be made to take 300mg in the morning and 400mg in the evening as this is what works best for patients pain. Gabapentin 400mg nightly is pending for your review. Please advise if appropriate.

## 2022-12-07 DIAGNOSIS — I10 PRIMARY HYPERTENSION: ICD-10-CM

## 2022-12-07 DIAGNOSIS — M47.12 OSTEOARTHRITIS OF CERVICAL SPINE WITH MYELOPATHY: ICD-10-CM

## 2022-12-07 DIAGNOSIS — M47.817 OSTEOARTHRITIS OF LUMBOSACRAL SPINE: ICD-10-CM

## 2022-12-07 NOTE — TELEPHONE ENCOUNTER
Medication Requested: Hydrocodone     Last visit: 11/9/2022  Next visit: Visit date not found  Last refill: 11/9/2022    Med contract on file:  [x] yes   [] no    Last urine drug screen: NA  Consistent with medication(s):    [] yes   [] no    Last OARRS ran: unknown    Quantity of medication remaining:     Who will be picking rx up:     Pharmacy if escribed: [facility] / [facility]

## 2022-12-08 RX ORDER — HYDROCODONE BITARTRATE AND ACETAMINOPHEN 10; 325 MG/1; MG/1
1 TABLET ORAL EVERY 6 HOURS PRN
Qty: 120 TABLET | Refills: 0 | Status: SHIPPED | OUTPATIENT
Start: 2022-12-08 | End: 2023-01-07

## 2023-01-04 RX ORDER — ALBUTEROL SULFATE 1.25 MG/3ML
1 SOLUTION RESPIRATORY (INHALATION) EVERY 6 HOURS PRN
Qty: 360 ML | Refills: 1 | Status: SHIPPED | OUTPATIENT
Start: 2023-01-04 | End: 2023-03-05

## 2023-01-04 NOTE — TELEPHONE ENCOUNTER
LV 11/09/2022  NV 01/09/2022    Patient is in need of a new hose for his nebulizer machine. Patient has no idea where to get this from.

## 2023-01-09 ENCOUNTER — OFFICE VISIT (OUTPATIENT)
Dept: INTERNAL MEDICINE CLINIC | Age: 83
End: 2023-01-09
Payer: MEDICARE

## 2023-01-09 VITALS
BODY MASS INDEX: 32.65 KG/M2 | WEIGHT: 196 LBS | HEIGHT: 65 IN | SYSTOLIC BLOOD PRESSURE: 124 MMHG | DIASTOLIC BLOOD PRESSURE: 76 MMHG

## 2023-01-09 DIAGNOSIS — I10 ESSENTIAL HYPERTENSION: ICD-10-CM

## 2023-01-09 DIAGNOSIS — M47.12 OSTEOARTHRITIS OF CERVICAL SPINE WITH MYELOPATHY: ICD-10-CM

## 2023-01-09 DIAGNOSIS — I10 PRIMARY HYPERTENSION: ICD-10-CM

## 2023-01-09 DIAGNOSIS — F11.99 OPIOID USE, UNSPECIFIED WITH UNSPECIFIED OPIOID-INDUCED DISORDER (HCC): ICD-10-CM

## 2023-01-09 DIAGNOSIS — Z13.220 SCREENING FOR HYPERLIPIDEMIA: Primary | ICD-10-CM

## 2023-01-09 DIAGNOSIS — R26.0 ATAXIA INVOLVING LEGS: ICD-10-CM

## 2023-01-09 DIAGNOSIS — M47.817 OSTEOARTHRITIS OF LUMBOSACRAL SPINE: ICD-10-CM

## 2023-01-09 PROCEDURE — 3078F DIAST BP <80 MM HG: CPT | Performed by: INTERNAL MEDICINE

## 2023-01-09 PROCEDURE — G8484 FLU IMMUNIZE NO ADMIN: HCPCS | Performed by: INTERNAL MEDICINE

## 2023-01-09 PROCEDURE — G8417 CALC BMI ABV UP PARAM F/U: HCPCS | Performed by: INTERNAL MEDICINE

## 2023-01-09 PROCEDURE — 99214 OFFICE O/P EST MOD 30 MIN: CPT | Performed by: INTERNAL MEDICINE

## 2023-01-09 PROCEDURE — 1123F ACP DISCUSS/DSCN MKR DOCD: CPT | Performed by: INTERNAL MEDICINE

## 2023-01-09 PROCEDURE — 3074F SYST BP LT 130 MM HG: CPT | Performed by: INTERNAL MEDICINE

## 2023-01-09 PROCEDURE — G8427 DOCREV CUR MEDS BY ELIG CLIN: HCPCS | Performed by: INTERNAL MEDICINE

## 2023-01-09 PROCEDURE — 1036F TOBACCO NON-USER: CPT | Performed by: INTERNAL MEDICINE

## 2023-01-09 RX ORDER — HYDROCODONE BITARTRATE AND ACETAMINOPHEN 10; 325 MG/1; MG/1
1 TABLET ORAL EVERY 6 HOURS PRN
Qty: 120 TABLET | Refills: 0 | Status: SHIPPED | OUTPATIENT
Start: 2023-01-09 | End: 2023-02-08

## 2023-01-09 ASSESSMENT — ENCOUNTER SYMPTOMS
COUGH: 0
BACK PAIN: 1
GASTROINTESTINAL NEGATIVE: 1
EYES NEGATIVE: 1

## 2023-01-09 ASSESSMENT — PATIENT HEALTH QUESTIONNAIRE - PHQ9
1. LITTLE INTEREST OR PLEASURE IN DOING THINGS: 0
2. FEELING DOWN, DEPRESSED OR HOPELESS: 0
SUM OF ALL RESPONSES TO PHQ QUESTIONS 1-9: 0
SUM OF ALL RESPONSES TO PHQ QUESTIONS 1-9: 0
SUM OF ALL RESPONSES TO PHQ9 QUESTIONS 1 & 2: 0
SUM OF ALL RESPONSES TO PHQ QUESTIONS 1-9: 0
SUM OF ALL RESPONSES TO PHQ QUESTIONS 1-9: 0

## 2023-01-09 NOTE — PROGRESS NOTES
Subjective:      Patient ID: Mana Cowan is a 80 y.o. male. Continues to have cervical pain due to cervical osteoarthritis. He also has lumbosacral osteoarthritis he is on narcotics plus gabapentin. I will do my best to reduce the dosage  This is a special visit after recent hospitalization. Admitted to the hospital with DANIELLE and COVID-19 pneumonia he was volume depleted and also developed COVID-19 respiratory symptoms his clinical condition is improved. He continues to have ataxia which is most likely due to cervical lumbosacral osteoarthritis. He has been on long-term narcotic therapy for the. Today he has persistent cough but no sputum production. States that he does not have dyspnea on exertion  The patient was found to have a high potassium of 6.1, he was taken off ACE inhibitor and his potassium came down to 5.9 he was advised that he should drink water today his blood pressure is 132/70 and he is asymptomatic. I will repeat his BMP today  Today on February 27 he is symptomatic with persistent backache and neck pain he is known to have advanced osteoarthritis involving cervical and lumbar spine and requires narcotic therapy. He denied any other symptoms  Today on May 18, 2022 he is here for follow-up of hypertension, advanced osteoarthritis involving multiple joints especially cervical spine. Is to be noted that he does have  ataxia due to cervical osteoarthritis. He is also here for follow-up of hypertensio  Today and August 10, 2022 he is symptomatic with persistent pain and is requesting to up to 6 pills of Norco per day. He denied any cardiorespiratory symptoms  Today on January 9, 2022 he is asymptomatic except occasional neck pain and backache due to his extensive osteoarthritis    Hypertension  This is a chronic problem. The current episode started more than 1 year ago. The problem is unchanged. The problem is controlled. Associated symptoms include anxiety and neck pain.  There are no associated agents to hypertension. Risk factors for coronary artery disease include dyslipidemia and obesity. Past treatments include ACE inhibitors. The current treatment provides moderate improvement. Review of Systems   Constitutional: Negative. HENT: Negative. Eyes: Negative. Respiratory:  Negative for cough. Cardiovascular: Negative. Gastrointestinal: Negative. Endocrine: Negative. Genitourinary: Negative. Musculoskeletal:  Positive for back pain and neck pain. Objective:   Physical Exam  Constitutional:       Appearance: Normal appearance. Neck:      Vascular: No carotid bruit. Cardiovascular:      Rate and Rhythm: Regular rhythm. Heart sounds: No murmur heard. No gallop. Pulmonary:      Breath sounds: No wheezing or rhonchi. Abdominal:      General: There is no distension. Palpations: There is no mass. Tenderness: There is no abdominal tenderness. Hernia: No hernia is present. Musculoskeletal:         General: Deformity present. Comments: Extensive osteoarthritis of lumbar and cervical spine   Lymphadenopathy:      Cervical: No cervical adenopathy. Skin:     General: Skin is warm and dry. Coloration: Skin is not jaundiced or pale. Findings: No bruising, erythema, lesion or rash. Neurological:      General: No focal deficit present. Mental Status: He is alert and oriented to person, place, and time. Assessment / Plan:      Diagnosis Orders   1. Screening for hyperlipidemia  Lipid Panel      2. Osteoarthritis of cervical spine with myelopathy  HYDROcodone-acetaminophen (NORCO)  MG per tablet      3. Osteoarthritis of lumbosacral spine  HYDROcodone-acetaminophen (NORCO)  MG per tablet      4. Primary hypertension  HYDROcodone-acetaminophen (NORCO)  MG per tablet      5. Essential hypertension = controlled       6.  Opioid use, unspecified with unspecified opioid-induced disorder = he continues to require narcotics for his extensive osteoarthritis       7. Ataxia involving legs = gait has improved       Return for Follow Up. Orders Placed This Encounter   Procedures    Lipid Panel     Standing Status:   Future     Standing Expiration Date:   1/9/2024     Order Specific Question:   Is Patient Fasting?/# of Hours     Answer:   No     Orders Placed This Encounter   Medications    HYDROcodone-acetaminophen (NORCO)  MG per tablet     Sig: Take 1 tablet by mouth every 6 hours as needed for Pain for up to 30 days. Dispense:  120 tablet     Refill:  0     Reduce doses taken as pain becomes manageable    His last laboratory work-up was done February 2022 and the laboratory work-up at that time was normal.  His medications were discussed with him and he was advised to cut down weight and less carbohydrates like pasta and bread  Visit Information    Have you changed or started any medications since your last visit including any over-the-counter medicines, vitamins, or herbal medicines? no   Are you having any side effects from any of your medications? -  no  Have you stopped taking any of your medications? Is so, why? -  no    Have you seen any other physician or provider since your last visit? No  Have you had any other diagnostic tests since your last visit? No  Have you been seen in the emergency room and/or had an admission to a hospital since we last saw you? No  Have you had your routine dental cleaning in the past 6 months? no    Have you activated your [employer] account? If not, what are your barriers?  No:      Patient Care Team:  Liana Solomon MD as PCP - General (Internal Medicine)  Liana Solomon MD as PCP - [facility] Empaneled Provider  Milana Shah MD as Consulting Physician (Gastroenterology)    Medical History Review  Past Medical, Family, and Social History reviewed and does not contribute to the patient presenting condition    Health Maintenance   Topic Date Due    COVID-19 Vaccine (1) Never done    DTaP/Tdap/Td vaccine (1 - Tdap) Never done    Shingles vaccine (1 of 2) Never done    Pneumococcal 65+ years Vaccine (1 - PCV) Never done    Flu vaccine (1) Never done    Lipids  10/12/2022    Depression Screen  11/09/2023    Annual Wellness Visit (AWV)  11/10/2023    Hepatitis A vaccine  Aged Out    Hib vaccine  Aged Out    Meningococcal (ACWY) vaccine  Aged Out

## 2023-01-16 RX ORDER — ALBUTEROL SULFATE 90 UG/1
2 AEROSOL, METERED RESPIRATORY (INHALATION) 4 TIMES DAILY
Qty: 34 G | Refills: 3 | Status: SHIPPED | OUTPATIENT
Start: 2023-01-16

## 2023-01-19 ENCOUNTER — NURSE TRIAGE (OUTPATIENT)
Dept: OTHER | Age: 83
End: 2023-01-19

## 2023-01-19 NOTE — TELEPHONE ENCOUNTER
Patient's wife called stating that he only has about 5 doses of his albuterol left. She states that the pharmacy is waiting for a clarification on the orders. 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 Please call the family.    Reason for Disposition   [1] Prescription refill request for NON-ESSENTIAL medicine (i.e., no harm to patient if med not taken) AND [2] triager unable to refill per department policy    Protocols used: Medication Refill and Renewal Call-ADULT-

## 2023-01-20 ENCOUNTER — TELEPHONE (OUTPATIENT)
Dept: INTERNAL MEDICINE CLINIC | Age: 83
End: 2023-01-20

## 2023-01-20 NOTE — TELEPHONE ENCOUNTER
Per Nurse Triage, patient is in need of albuterol inhaler refill. Spoke with patient who stated [facility] had an error with the refill. Form received from pharmacy and medication clarification was received and faxed back to pharmacy.

## 2023-02-07 DIAGNOSIS — I10 PRIMARY HYPERTENSION: ICD-10-CM

## 2023-02-07 DIAGNOSIS — M47.817 OSTEOARTHRITIS OF LUMBOSACRAL SPINE: ICD-10-CM

## 2023-02-07 DIAGNOSIS — M47.12 OSTEOARTHRITIS OF CERVICAL SPINE WITH MYELOPATHY: ICD-10-CM

## 2023-02-07 RX ORDER — HYDROCODONE BITARTRATE AND ACETAMINOPHEN 10; 325 MG/1; MG/1
1 TABLET ORAL EVERY 6 HOURS PRN
Qty: 120 TABLET | Refills: 0 | Status: SHIPPED | OUTPATIENT
Start: 2023-02-07 | End: 2023-03-09

## 2023-02-07 NOTE — TELEPHONE ENCOUNTER
----- Message from [employer] sent at 2/7/2023  9:45 AM EST -----  Subject: Refill Request    QUESTIONS  Name of Medication? HYDROcodone-acetaminophen (NORCO)  MG per tablet  Patient-reported dosage and instructions? 10-325mg  How many days do you have left? 4  Preferred Pharmacy? [facility] [id number]  Pharmacy phone number (if available)? [phone]  ---------------------------------------------------------------------------  --------------  CALL BACK INFO  What is the best way for the office to contact you? OK to leave message on   voicemail  Preferred Call Back Phone Number? [phone]  ---------------------------------------------------------------------------  --------------  SCRIPT ANSWERS  Relationship to Patient? Other  Representative Name? Jacob Lee  Is the Representative on the appropriate HIPAA document in Epic?  Yes

## 2023-03-02 DIAGNOSIS — Z13.220 SCREENING FOR HYPERLIPIDEMIA: ICD-10-CM

## 2023-03-02 RX ORDER — SIMVASTATIN 40 MG
TABLET ORAL
Qty: 90 TABLET | Refills: 3 | Status: SHIPPED | OUTPATIENT
Start: 2023-03-02

## 2023-03-09 DIAGNOSIS — I10 PRIMARY HYPERTENSION: ICD-10-CM

## 2023-03-09 DIAGNOSIS — M47.817 OSTEOARTHRITIS OF LUMBOSACRAL SPINE: ICD-10-CM

## 2023-03-09 DIAGNOSIS — M47.12 OSTEOARTHRITIS OF CERVICAL SPINE WITH MYELOPATHY: ICD-10-CM

## 2023-03-09 RX ORDER — HYDROCODONE BITARTRATE AND ACETAMINOPHEN 10; 325 MG/1; MG/1
1 TABLET ORAL EVERY 6 HOURS PRN
Qty: 120 TABLET | Refills: 0 | Status: SHIPPED | OUTPATIENT
Start: 2023-03-09 | End: 2023-04-08

## 2023-03-09 NOTE — TELEPHONE ENCOUNTER
Medication Requested: Lindsay Ulloa    Last visit: 2023  Next visit: 4/10/2023  Last refill: 2023    Med contract on file:  [] yes   [] no    Last urine drug screen: 04/15/2021  Consistent with medication(s):    [x] yes   [] no    Last OARRS ran: Unknown    Quantity of medication remainin    Who will be picking rx up: Patient     Pharmacy if escribed: [facility]

## 2023-04-03 ENCOUNTER — HOSPITAL ENCOUNTER (OUTPATIENT)
Age: 83
Setting detail: SPECIMEN
Discharge: HOME | End: 2023-04-03

## 2023-04-03 DIAGNOSIS — Z13.220 SCREENING FOR HYPERLIPIDEMIA: ICD-10-CM

## 2023-04-03 LAB
CHOLEST SERPL-MCNC: 132 MG/DL
CHOLESTEROL/HDL RATIO: 1.9
HDLC SERPL-MCNC: 71 MG/DL
LDLC SERPL CALC-MCNC: 41 MG/DL (ref 0–130)
TRIGL SERPL-MCNC: 100 MG/DL

## 2023-05-09 DIAGNOSIS — M47.817 OSTEOARTHRITIS OF LUMBOSACRAL SPINE: ICD-10-CM

## 2023-05-09 DIAGNOSIS — M47.12 OSTEOARTHRITIS OF CERVICAL SPINE WITH MYELOPATHY: ICD-10-CM

## 2023-05-09 DIAGNOSIS — I10 PRIMARY HYPERTENSION: ICD-10-CM

## 2023-05-09 RX ORDER — HYDROCODONE BITARTRATE AND ACETAMINOPHEN 10; 325 MG/1; MG/1
1 TABLET ORAL EVERY 6 HOURS PRN
Qty: 120 TABLET | Refills: 0 | Status: SHIPPED | OUTPATIENT
Start: 2023-05-09 | End: 2023-06-08

## 2023-05-09 NOTE — TELEPHONE ENCOUNTER
Medication Requested: Hydrocodone    Last visit: 4/10/2023  Next visit: 2023  Last refill: 2023    Med contract on file:  [] yes   [] no    Last urine drug screen: N/A  Consistent with medication(s):    [x] yes   [] no    Last OARRS ran: Unknown    Quantity of medication remainin    Who will be picking rx up: Patient    Pharmacy if escribed: [facility]

## 2023-06-07 DIAGNOSIS — M47.817 OSTEOARTHRITIS OF LUMBOSACRAL SPINE: ICD-10-CM

## 2023-06-07 DIAGNOSIS — M47.12 OSTEOARTHRITIS OF CERVICAL SPINE WITH MYELOPATHY: ICD-10-CM

## 2023-06-07 DIAGNOSIS — I10 PRIMARY HYPERTENSION: ICD-10-CM

## 2023-06-07 RX ORDER — HYDROCODONE BITARTRATE AND ACETAMINOPHEN 10; 325 MG/1; MG/1
1 TABLET ORAL EVERY 6 HOURS PRN
Qty: 120 TABLET | Refills: 0 | Status: SHIPPED | OUTPATIENT
Start: 2023-06-07 | End: 2023-07-07

## 2023-06-07 NOTE — TELEPHONE ENCOUNTER
Medication Requested: Hydrocodone    Last visit: 4/10/2023  Next visit: 2023  Last refill: 2023    Med contract on file:  [] yes   [] no    Last urine drug screen: N/A  Consistent with medication(s):    [x] yes   [] no    Last OARRS ran: Unknown    Quantity of medication remainin    Who will be picking rx up: Patient    Pharmacy if escribed: [facility]

## 2023-07-05 ENCOUNTER — HOSPITAL ENCOUNTER (OUTPATIENT)
Age: 83
Setting detail: SPECIMEN
Discharge: HOME | End: 2023-07-05

## 2023-07-05 ENCOUNTER — OFFICE VISIT (OUTPATIENT)
Dept: INTERNAL MEDICINE CLINIC | Age: 83
End: 2023-07-05
Payer: MEDICARE

## 2023-07-05 VITALS
HEART RATE: 76 BPM | SYSTOLIC BLOOD PRESSURE: 118 MMHG | HEIGHT: 65 IN | BODY MASS INDEX: 32.78 KG/M2 | DIASTOLIC BLOOD PRESSURE: 70 MMHG | OXYGEN SATURATION: 94 %

## 2023-07-05 DIAGNOSIS — F11.99 OPIOID USE, UNSPECIFIED WITH UNSPECIFIED OPIOID-INDUCED DISORDER (HCC): ICD-10-CM

## 2023-07-05 DIAGNOSIS — I10 PRIMARY HYPERTENSION: ICD-10-CM

## 2023-07-05 DIAGNOSIS — M47.12 OSTEOARTHRITIS OF CERVICAL SPINE WITH MYELOPATHY: ICD-10-CM

## 2023-07-05 DIAGNOSIS — M47.817 OSTEOARTHRITIS OF LUMBOSACRAL SPINE: ICD-10-CM

## 2023-07-05 DIAGNOSIS — I10 ESSENTIAL HYPERTENSION: Primary | ICD-10-CM

## 2023-07-05 DIAGNOSIS — R26.0 ATAXIA INVOLVING LEGS: ICD-10-CM

## 2023-07-05 LAB
ALBUMIN SERPL-MCNC: 4.3 G/DL (ref 3.5–5.2)
ALBUMIN/GLOB SERPL: 1.5 {RATIO} (ref 1–2.5)
ALP SERPL-CCNC: 67 U/L (ref 40–129)
ALT SERPL-CCNC: 13 U/L (ref 5–41)
ANION GAP SERPL CALCULATED.3IONS-SCNC: 14 MMOL/L (ref 9–17)
AST SERPL-CCNC: 18 U/L
BASOPHILS # BLD: <0.03 K/UL (ref 0–0.2)
BASOPHILS NFR BLD: 0 % (ref 0–2)
BILIRUB SERPL-MCNC: 0.4 MG/DL (ref 0.3–1.2)
BILIRUB UR QL STRIP: NEGATIVE
BUN SERPL-MCNC: 16 MG/DL (ref 8–23)
CALCIUM SERPL-MCNC: 9.9 MG/DL (ref 8.6–10.4)
CASTS #/AREA URNS LPF: ABNORMAL /LPF (ref 0–8)
CHLORIDE SERPL-SCNC: 102 MMOL/L (ref 98–107)
CLARITY UR: CLEAR
CO2 SERPL-SCNC: 23 MMOL/L (ref 20–31)
COLOR UR: ABNORMAL
CREAT SERPL-MCNC: 0.91 MG/DL (ref 0.7–1.2)
CRP SERPL HS-MCNC: <3 MG/L (ref 0–5)
EOSINOPHIL # BLD: 0.06 K/UL (ref 0–0.44)
EOSINOPHILS RELATIVE PERCENT: 1 % (ref 1–4)
EPI CELLS #/AREA URNS HPF: ABNORMAL /HPF (ref 0–5)
ERYTHROCYTE [DISTWIDTH] IN BLOOD BY AUTOMATED COUNT: 13.6 % (ref 11.8–14.4)
ERYTHROCYTE [SEDIMENTATION RATE] IN BLOOD BY WESTERGREN METHOD: 16 MM/HR (ref 0–20)
GFR SERPL CREATININE-BSD FRML MDRD: >60 ML/MIN/1.73M2
GLUCOSE SERPL-MCNC: 92 MG/DL (ref 70–99)
GLUCOSE UR STRIP-MCNC: NEGATIVE MG/DL
HCT VFR BLD AUTO: 45.6 % (ref 40.7–50.3)
HGB BLD-MCNC: 14.8 G/DL (ref 13–17)
HGB UR QL STRIP.AUTO: NEGATIVE
IMM GRANULOCYTES # BLD AUTO: 0.03 K/UL (ref 0–0.3)
IMM GRANULOCYTES NFR BLD: 1 %
KETONES UR STRIP-MCNC: NEGATIVE MG/DL
LEUKOCYTE ESTERASE UR QL STRIP: NEGATIVE
LYMPHOCYTES # BLD: 40 % (ref 24–43)
LYMPHOCYTES NFR BLD: 2.2 K/UL (ref 1.1–3.7)
MCH RBC QN AUTO: 29.1 PG (ref 25.2–33.5)
MCHC RBC AUTO-ENTMCNC: 32.5 G/DL (ref 28.4–34.8)
MCV RBC AUTO: 89.6 FL (ref 82.6–102.9)
MONOCYTES NFR BLD: 0.65 K/UL (ref 0.1–1.2)
MONOCYTES NFR BLD: 12 % (ref 3–12)
NEUTROPHILS NFR BLD: 46 % (ref 36–65)
NEUTS SEG NFR BLD: 2.6 K/UL (ref 1.5–8.1)
NITRITE UR QL STRIP: NEGATIVE
NRBC BLD-RTO: 0 PER 100 WBC
PH UR STRIP: 5.5 [PH] (ref 5–8)
PLATELET # BLD AUTO: 250 K/UL (ref 138–453)
PMV BLD AUTO: 10.3 FL (ref 8.1–13.5)
POTASSIUM SERPL-SCNC: 5.1 MMOL/L (ref 3.7–5.3)
PROT SERPL-MCNC: 7.2 G/DL (ref 6.4–8.3)
PROT UR STRIP-MCNC: NEGATIVE MG/DL
RBC # BLD AUTO: 5.09 M/UL (ref 4.21–5.77)
RBC #/AREA URNS HPF: ABNORMAL /HPF (ref 0–4)
SODIUM SERPL-SCNC: 139 MMOL/L (ref 135–144)
SP GR UR STRIP: 1.03 (ref 1–1.03)
TSH SERPL DL<=0.05 MIU/L-ACNC: 1.15 UIU/ML (ref 0.3–5)
UROBILINOGEN UR STRIP-ACNC: NORMAL
WBC #/AREA URNS HPF: ABNORMAL /HPF (ref 0–5)
WBC OTHER # BLD: 5.6 K/UL (ref 3.5–11.3)

## 2023-07-05 PROCEDURE — G8427 DOCREV CUR MEDS BY ELIG CLIN: HCPCS | Performed by: INTERNAL MEDICINE

## 2023-07-05 PROCEDURE — 1123F ACP DISCUSS/DSCN MKR DOCD: CPT | Performed by: INTERNAL MEDICINE

## 2023-07-05 PROCEDURE — 3078F DIAST BP <80 MM HG: CPT | Performed by: INTERNAL MEDICINE

## 2023-07-05 PROCEDURE — 1036F TOBACCO NON-USER: CPT | Performed by: INTERNAL MEDICINE

## 2023-07-05 PROCEDURE — 99213 OFFICE O/P EST LOW 20 MIN: CPT | Performed by: INTERNAL MEDICINE

## 2023-07-05 PROCEDURE — G8417 CALC BMI ABV UP PARAM F/U: HCPCS | Performed by: INTERNAL MEDICINE

## 2023-07-05 PROCEDURE — 3074F SYST BP LT 130 MM HG: CPT | Performed by: INTERNAL MEDICINE

## 2023-07-05 RX ORDER — HYDROCODONE BITARTRATE AND ACETAMINOPHEN 10; 325 MG/1; MG/1
1 TABLET ORAL EVERY 6 HOURS PRN
Qty: 120 TABLET | Refills: 0 | Status: SHIPPED | OUTPATIENT
Start: 2023-07-05 | End: 2023-08-04

## 2023-07-05 ASSESSMENT — ENCOUNTER SYMPTOMS
GASTROINTESTINAL NEGATIVE: 1
EYES NEGATIVE: 1
COUGH: 0
BACK PAIN: 1

## 2023-07-05 NOTE — PROGRESS NOTES
Subjective:      Patient ID: Raiza Gibbs is a 80 y.o. male. Continues to have cervical pain due to cervical osteoarthritis. He also has lumbosacral osteoarthritis he is on narcotics plus gabapentin. I will do my best to reduce the dosage  This is a special visit after recent hospitalization. Admitted to the hospital with DANIELLE and COVID-19 pneumonia he was volume depleted and also developed COVID-19 respiratory symptoms his clinical condition is improved. He continues to have ataxia which is most likely due to cervical lumbosacral osteoarthritis. He has been on long-term narcotic therapy for the. Today he has persistent cough but no sputum production. States that he does not have dyspnea on exertion  The patient was found to have a high potassium of 6.1, he was taken off ACE inhibitor and his potassium came down to 5.9 he was advised that he should drink water today his blood pressure is 132/70 and he is asymptomatic. I will repeat his BMP today  Today on February 27 he is symptomatic with persistent backache and neck pain he is known to have advanced osteoarthritis involving cervical and lumbar spine and requires narcotic therapy. He denied any other symptoms  Today on May 18, 2022 he is here for follow-up of hypertension, advanced osteoarthritis involving multiple joints especially cervical spine. Is to be noted that he does have  ataxia due to cervical osteoarthritis. He is also here for follow-up of hypertensio  Today and August 10, 2022 he is symptomatic with persistent pain and is requesting to up to 6 pills of Norco per day. He denied any cardiorespiratory symptoms  Today on January 9, 2022 he is asymptomatic except occasional neck pain and backache due to his extensive osteoarthritis  On April 10, 2023 he is symptomatic with chronic pain due to advanced osteoarthritis he denied any cardiorespiratory symptoms.   Memory is intact  July 5, 2023 is here for follow-up of multiple medical problems

## 2023-07-07 DIAGNOSIS — M47.12 OSTEOARTHRITIS OF CERVICAL SPINE WITH MYELOPATHY: ICD-10-CM

## 2023-07-07 RX ORDER — GABAPENTIN 300 MG/1
300 CAPSULE ORAL 2 TIMES DAILY
Qty: 180 CAPSULE | Refills: 3 | Status: SHIPPED | OUTPATIENT
Start: 2023-07-07 | End: 2024-07-01

## 2023-08-03 DIAGNOSIS — M47.817 OSTEOARTHRITIS OF LUMBOSACRAL SPINE: ICD-10-CM

## 2023-08-03 DIAGNOSIS — I10 PRIMARY HYPERTENSION: ICD-10-CM

## 2023-08-03 DIAGNOSIS — M47.12 OSTEOARTHRITIS OF CERVICAL SPINE WITH MYELOPATHY: ICD-10-CM

## 2023-08-03 NOTE — TELEPHONE ENCOUNTER
Medication Requested: Hydrocodone    Last visit: 2023  Next visit: 2023  Last refill: 2023    Med contract on file:  [] yes   [] no    Last urine drug screen: N/A  Consistent with medication(s):    [x] yes   [] no    Last OARRS ran: Unknown    Quantity of medication remainin    Who will be picking rx up: Patient     Ph[facility] if escribed: [facility]    PCP DAISY please advdarian

## 2023-08-04 RX ORDER — HYDROCODONE BITARTRATE AND ACETAMINOPHEN 10; 325 MG/1; MG/1
1 TABLET ORAL EVERY 6 HOURS PRN
Qty: 120 TABLET | Refills: 0 | Status: SHIPPED | OUTPATIENT
Start: 2023-08-04 | End: 2023-09-03

## 2023-08-28 DIAGNOSIS — M47.817 OSTEOARTHRITIS OF LUMBOSACRAL SPINE: ICD-10-CM

## 2023-08-28 DIAGNOSIS — I10 PRIMARY HYPERTENSION: ICD-10-CM

## 2023-08-28 DIAGNOSIS — M47.12 OSTEOARTHRITIS OF CERVICAL SPINE WITH MYELOPATHY: ICD-10-CM

## 2023-08-28 RX ORDER — HYDROCODONE BITARTRATE AND ACETAMINOPHEN 10; 325 MG/1; MG/1
1 TABLET ORAL EVERY 6 HOURS PRN
Qty: 120 TABLET | Refills: 0 | Status: SHIPPED | OUTPATIENT
Start: 2023-08-28 | End: 2023-09-27

## 2023-08-28 NOTE — TELEPHONE ENCOUNTER
Medication Requested: Ibrahima Norwood    Last visit: 23  Next visit: 2023  Last refill: 8/3/2023    Med contract on file:  [] yes   [] no    Last urine drug screen: N/A  Consistent with medication(s):    [x] yes   [] no    Last OARRS ran: Unknown    Quantity of medication remainin    Who will be picking rx up: Patient    Pharmacy if escribed: [facility]

## 2023-09-18 ENCOUNTER — OFFICE VISIT (OUTPATIENT)
Dept: INTERNAL MEDICINE CLINIC | Age: 83
End: 2023-09-18
Payer: MEDICARE

## 2023-09-18 VITALS
DIASTOLIC BLOOD PRESSURE: 62 MMHG | OXYGEN SATURATION: 97 % | HEART RATE: 89 BPM | BODY MASS INDEX: 30.49 KG/M2 | SYSTOLIC BLOOD PRESSURE: 138 MMHG | HEIGHT: 65 IN | WEIGHT: 183 LBS

## 2023-09-18 DIAGNOSIS — N40.1 BENIGN PROSTATIC HYPERPLASIA WITH WEAK URINARY STREAM: ICD-10-CM

## 2023-09-18 DIAGNOSIS — M47.12 OSTEOARTHRITIS OF CERVICAL SPINE WITH MYELOPATHY: ICD-10-CM

## 2023-09-18 DIAGNOSIS — R26.0 ATAXIA INVOLVING LEGS: ICD-10-CM

## 2023-09-18 DIAGNOSIS — I10 ESSENTIAL HYPERTENSION: Primary | ICD-10-CM

## 2023-09-18 DIAGNOSIS — R39.12 BENIGN PROSTATIC HYPERPLASIA WITH WEAK URINARY STREAM: ICD-10-CM

## 2023-09-18 PROCEDURE — 1123F ACP DISCUSS/DSCN MKR DOCD: CPT | Performed by: INTERNAL MEDICINE

## 2023-09-18 PROCEDURE — G8417 CALC BMI ABV UP PARAM F/U: HCPCS | Performed by: INTERNAL MEDICINE

## 2023-09-18 PROCEDURE — 3075F SYST BP GE 130 - 139MM HG: CPT | Performed by: INTERNAL MEDICINE

## 2023-09-18 PROCEDURE — 99214 OFFICE O/P EST MOD 30 MIN: CPT | Performed by: INTERNAL MEDICINE

## 2023-09-18 PROCEDURE — 3078F DIAST BP <80 MM HG: CPT | Performed by: INTERNAL MEDICINE

## 2023-09-18 PROCEDURE — G8427 DOCREV CUR MEDS BY ELIG CLIN: HCPCS | Performed by: INTERNAL MEDICINE

## 2023-09-18 PROCEDURE — 1036F TOBACCO NON-USER: CPT | Performed by: INTERNAL MEDICINE

## 2023-09-18 ASSESSMENT — ENCOUNTER SYMPTOMS
COUGH: 0
GASTROINTESTINAL NEGATIVE: 1
EYES NEGATIVE: 1
BACK PAIN: 1

## 2023-09-18 NOTE — PROGRESS NOTES
Subjective:      Patient ID: Trell Matthews is a 80 y.o. male. Continues to have cervical pain due to cervical osteoarthritis. He also has lumbosacral osteoarthritis he is on narcotics plus gabapentin. I will do my best to reduce the dosage  This is a special visit after recent hospitalization. Admitted to the hospital with DANIELLE and COVID-19 pneumonia he was volume depleted and also developed COVID-19 respiratory symptoms his clinical condition is improved. He continues to have ataxia which is most likely due to cervical lumbosacral osteoarthritis. He has been on long-term narcotic therapy for the. Today he has persistent cough but no sputum production. States that he does not have dyspnea on exertion  The patient was found to have a high potassium of 6.1, he was taken off ACE inhibitor and his potassium came down to 5.9 he was advised that he should drink water today his blood pressure is 132/70 and he is asymptomatic. I will repeat his BMP today  Today on February 27 he is symptomatic with persistent backache and neck pain he is known to have advanced osteoarthritis involving cervical and lumbar spine and requires narcotic therapy. He denied any other symptoms  Today on May 18, 2022 he is here for follow-up of hypertension, advanced osteoarthritis involving multiple joints especially cervical spine. Is to be noted that he does have  ataxia due to cervical osteoarthritis. He is also here for follow-up of hypertensio  Today and August 10, 2022 he is symptomatic with persistent pain and is requesting to up to 6 pills of Norco per day. He denied any cardiorespiratory symptoms  Today on January 9, 2022 he is asymptomatic except occasional neck pain and backache due to his extensive osteoarthritis  On April 10, 2023 he is symptomatic with chronic pain due to advanced osteoarthritis he denied any cardiorespiratory symptoms.   Memory is intact  July 5, 2023 is here for follow-up of multiple medical problems

## 2023-09-25 RX ORDER — FLUTICASONE PROPIONATE AND SALMETEROL 250; 50 UG/1; UG/1
1 POWDER RESPIRATORY (INHALATION) 2 TIMES DAILY
Qty: 180 EACH | Refills: 3 | Status: SHIPPED | OUTPATIENT
Start: 2023-09-25

## 2023-09-27 DIAGNOSIS — M47.817 OSTEOARTHRITIS OF LUMBOSACRAL SPINE: ICD-10-CM

## 2023-09-27 DIAGNOSIS — M47.12 OSTEOARTHRITIS OF CERVICAL SPINE WITH MYELOPATHY: ICD-10-CM

## 2023-09-27 DIAGNOSIS — I10 PRIMARY HYPERTENSION: ICD-10-CM

## 2023-09-27 RX ORDER — HYDROCODONE BITARTRATE AND ACETAMINOPHEN 10; 325 MG/1; MG/1
1 TABLET ORAL EVERY 6 HOURS PRN
Qty: 120 TABLET | Refills: 0 | Status: SHIPPED | OUTPATIENT
Start: 2023-09-27 | End: 2023-09-28 | Stop reason: SDUPTHER

## 2023-09-28 DIAGNOSIS — M47.12 OSTEOARTHRITIS OF CERVICAL SPINE WITH MYELOPATHY: ICD-10-CM

## 2023-09-28 DIAGNOSIS — M47.817 OSTEOARTHRITIS OF LUMBOSACRAL SPINE: ICD-10-CM

## 2023-09-28 DIAGNOSIS — I10 PRIMARY HYPERTENSION: ICD-10-CM

## 2023-09-28 RX ORDER — HYDROCODONE BITARTRATE AND ACETAMINOPHEN 10; 325 MG/1; MG/1
1 TABLET ORAL EVERY 6 HOURS PRN
Qty: 120 TABLET | Refills: 0 | Status: SHIPPED | OUTPATIENT
Start: 2023-09-28 | End: 2023-10-28

## 2023-09-28 NOTE — TELEPHONE ENCOUNTER
Patient's wife called stating the [facility] [address] is out of [place] and needs to be sent to the [facility] on [address].

## 2023-10-13 ENCOUNTER — NURSE ONLY (OUTPATIENT)
Dept: INTERNAL MEDICINE CLINIC | Age: 83
End: 2023-10-13

## 2023-10-13 VITALS — HEIGHT: 65 IN | WEIGHT: 182.2 LBS | BODY MASS INDEX: 30.35 KG/M2 | TEMPERATURE: 97.8 F

## 2023-10-13 DIAGNOSIS — Z23 NEEDS FLU SHOT: Primary | ICD-10-CM

## 2023-10-13 NOTE — PROGRESS NOTES
Are you sick today? no    Are you allergic to eggs? no    Have you ever had a reaction to the flu vaccine? no    Have you ever had Jozef Fuelling- barre's Syndrome? no    Per order from provider VIS given to patient, consent received,  flu vaccine was administered and documented in vaccine part of chart patient tolerated well.

## 2023-10-18 ENCOUNTER — OFFICE VISIT (OUTPATIENT)
Dept: ORTHOPEDIC SURGERY | Age: 83
End: 2023-10-18

## 2023-10-18 DIAGNOSIS — M25.561 RIGHT KNEE PAIN, UNSPECIFIED CHRONICITY: Primary | ICD-10-CM

## 2023-10-18 NOTE — PROGRESS NOTES
Respiratory/Cardio: Effort normal. No respiratory distress. Musculoskeletal: Examination the patient's right knee notes he has motion 0 to 120 degrees he has good strength against resistance without evidence for any quadriceps or patellar tendon injury. He has good varus and valgus stability at 0 and 30 degrees and overall looks very good    Patient examination of his left knee notes good motion 0 to 115 degrees good varus valgus stability and good strength against resistance. Also good patellar tracking    Neurological: Patient is alert and oriented to person, place, and time. Normal strength. No sensory deficit. Skin: Skin is warm and dry  Psychiatric: Behavior is normal. Thought content normal.  Nursing note and vitals reviewed. Labs and Imaging:     XR taken today:  XR KNEE BILATERAL LIMITED (1-2 VIEWS)    Result Date: 10/18/2023  X-rays taken today reviewed by me show standing AP of both knees and lateral of the right knee as well as left knee. Patient has on the left side a total knee arthroplasty with stems on both the femoral and tibial components consistent with a revision arthroplasty I do not see any obvious evidence for any periprosthetic complication loosening or otherwise. Excellent alignment patellar height is appropriate Examination of the patient's right knee notes a traditional total knee arthroplasty in the right I do not see evidence for any acute or chronic periprosthetic complications. He has good good and acceptable alignment as well as patellar height. No orders of the defined types were placed in this encounter. Assessment and Plan:  History of bilateral total knee replacements [place] greater than 20 years ago      No evidence for acute injury right knee status post fall        This is a 80 y.o. male who presents to the clinic today for evaluation / follow up of status post bilateral total knees.      Past History:    Current Outpatient Medications:

## 2023-10-26 DIAGNOSIS — I10 PRIMARY HYPERTENSION: ICD-10-CM

## 2023-10-26 DIAGNOSIS — M47.817 OSTEOARTHRITIS OF LUMBOSACRAL SPINE: ICD-10-CM

## 2023-10-26 DIAGNOSIS — M47.12 OSTEOARTHRITIS OF CERVICAL SPINE WITH MYELOPATHY: ICD-10-CM

## 2023-10-26 NOTE — TELEPHONE ENCOUNTER
Medication Requested: Hydrocodone    Last visit: 2023  Next visit: Visit date not found  Last refill: 2023    Med contract on file:  [] yes   [] no    Last urine drug screen: N/A  Consistent with medication(s):    [x] yes   [] no    Last OARRS ran: Unknown    Quantity of medication remainin    Who will be picking rx up:Patient/Wife    Pharmacy if escribed: [facility]    PCP DAISY please advise

## 2023-10-27 ENCOUNTER — TELEPHONE (OUTPATIENT)
Dept: INTERNAL MEDICINE CLINIC | Age: 83
End: 2023-10-27

## 2023-10-27 ENCOUNTER — HOSPITAL ENCOUNTER (OUTPATIENT)
Age: 83
Setting detail: SPECIMEN
Discharge: HOME | End: 2023-10-27

## 2023-10-27 DIAGNOSIS — Z79.899 ENCOUNTER FOR LONG-TERM (CURRENT) USE OF HIGH-RISK MEDICATION: Primary | ICD-10-CM

## 2023-10-27 DIAGNOSIS — M47.817 OSTEOARTHRITIS OF LUMBOSACRAL SPINE: ICD-10-CM

## 2023-10-27 DIAGNOSIS — I10 PRIMARY HYPERTENSION: ICD-10-CM

## 2023-10-27 DIAGNOSIS — Z79.899 ENCOUNTER FOR LONG-TERM (CURRENT) USE OF HIGH-RISK MEDICATION: ICD-10-CM

## 2023-10-27 DIAGNOSIS — M47.12 OSTEOARTHRITIS OF CERVICAL SPINE WITH MYELOPATHY: ICD-10-CM

## 2023-10-27 RX ORDER — HYDROCODONE BITARTRATE AND ACETAMINOPHEN 10; 325 MG/1; MG/1
1 TABLET ORAL EVERY 6 HOURS PRN
Qty: 120 TABLET | Refills: 0 | Status: SHIPPED | OUTPATIENT
Start: 2023-10-27 | End: 2023-11-26

## 2023-10-27 NOTE — TELEPHONE ENCOUNTER
Please send Rx to [facility]. [facility] [address] is out of medication. PCP is out of the office. Patient agreed to come into the office Monday to sign med contract. Will also need UDS. Please sign pending orders.

## 2023-10-28 LAB
6-ACETYLMORPHINE, UR: NORMAL
7-AMINOCLONAZEPAM, URINE: NORMAL
ALPHA-OH-ALPRAZ, URINE: NORMAL
ALPHA-OH-MIDAZOLAM, URINE: NORMAL
ALPRAZOLAM, URINE: NORMAL
AMPHETAMINES, URINE: NORMAL
BARBITURATES, URINE: NORMAL
BENZOYLECGONINE, UR: NORMAL
BUPRENORPHINE URINE: NORMAL
CARISOPRODOL, UR: NORMAL
CLONAZEPAM, URINE: NORMAL
CODEINE, URINE: NORMAL
CREATININE URINE: NORMAL
DIAZEPAM, URINE: NORMAL
DRUGS EXPECTED, UR: NORMAL
EER HI RES INTERP UR: NORMAL
ETHYL GLUCURONIDE UR: NORMAL
FENTANYL URINE: NORMAL
GABAPENTIN: NORMAL
HYDROCODONE, OPI6M: NORMAL
HYDROMORPHONE, OPI3M: NORMAL
LORAZEPAM, URINE: NORMAL
MARIJUANA METAB, UR: NORMAL
MDA, UR: NORMAL
MDEA, EVE, UR: NORMAL
MDMA URINE: NORMAL
MEPERIDINE METAB, UR: NORMAL
METHADONE, URINE: NORMAL
METHAMPHETAMINE, URINE: NORMAL
METHYLPHENIDATE: NORMAL
MIDAZOLAM, URINE: NORMAL
MORPHINE, OPI1M: NORMAL
NALOXONE URINE: NORMAL
NORBUPRENORPHINE, URINE: NORMAL
NORDIAZEPAM, URINE: NORMAL
NORFENTANYL, URINE: NORMAL
NORHYDROCODONE, URINE: NORMAL
NOROXYCODONE, URINE: NORMAL
NOROXYMORPHONE, URINE: NORMAL
OXAZEPAM, URINE: NORMAL
OXYCODONE URINE: NORMAL
OXYMORPHONE, URINE: NORMAL
PAIN MANAGEMENT DRUG PANEL INTERP, URINE: NORMAL
PAIN MGT DRUG PANEL, HI RES, UR: NORMAL
PCP,URINE: NORMAL
PHENTERMINE, UR: NORMAL
PREGABALIN: NORMAL
TAPENTADOL, URINE: NORMAL
TAPENTADOL-O-SULFATE, URINE: NORMAL
TEMAZEPAM, URINE: NORMAL
TRAMADOL, URINE: NORMAL
ZOLPIDEM METABOLITE (ZCA), URINE: NORMAL
ZOLPIDEM, URINE: NORMAL

## 2023-10-30 RX ORDER — HYDROCODONE BITARTRATE AND ACETAMINOPHEN 10; 325 MG/1; MG/1
1 TABLET ORAL EVERY 6 HOURS PRN
Qty: 120 TABLET | Refills: 0 | Status: SHIPPED | OUTPATIENT
Start: 2023-10-30 | End: 2023-11-29

## 2023-10-30 NOTE — TELEPHONE ENCOUNTER
Patients wife called to check on the status of Rx refill, informed that medication is still pending for signature

## 2023-10-30 NOTE — TELEPHONE ENCOUNTER
Patients wife calling to request medication be called in asap. Patient is out of medication and is in extreme pain. Please review and sign. PCP is out of the office.

## 2023-10-31 LAB
6-ACETYLMORPHINE, UR: NOT DETECTED
7-AMINOCLONAZEPAM, URINE: NOT DETECTED
ALPHA-OH-ALPRAZ, URINE: NOT DETECTED
ALPHA-OH-MIDAZOLAM, URINE: NOT DETECTED
ALPRAZOLAM, URINE: NOT DETECTED
AMPHETAMINES, URINE: NOT DETECTED
BARBITURATES, URINE: NOT DETECTED
BENZOYLECGONINE, UR: NOT DETECTED
BUPRENORPHINE URINE: NOT DETECTED
CARISOPRODOL, UR: NOT DETECTED
CLONAZEPAM, URINE: NOT DETECTED
CODEINE, URINE: PRESENT
CREATININE URINE: 176.1 MG/DL (ref 20–400)
DIAZEPAM, URINE: NOT DETECTED
DRUGS EXPECTED, UR: NORMAL
EER HI RES INTERP UR: NORMAL
ETHYL GLUCURONIDE UR: NOT DETECTED
FENTANYL URINE: NOT DETECTED
GABAPENTIN: PRESENT
HYDROCODONE, OPI6M: PRESENT
HYDROMORPHONE, OPI3M: PRESENT
LORAZEPAM, URINE: NOT DETECTED
MARIJUANA METAB, UR: NOT DETECTED
MDA, UR: NOT DETECTED
MDEA, EVE, UR: NOT DETECTED
MDMA URINE: NOT DETECTED
MEPERIDINE METAB, UR: NOT DETECTED
METHADONE, URINE: NOT DETECTED
METHAMPHETAMINE, URINE: NOT DETECTED
METHYLPHENIDATE: NOT DETECTED
MIDAZOLAM, URINE: NOT DETECTED
MORPHINE, OPI1M: PRESENT
NALOXONE URINE: NOT DETECTED
NORBUPRENORPHINE, URINE: NOT DETECTED
NORDIAZEPAM, URINE: NOT DETECTED
NORFENTANYL, URINE: NOT DETECTED
NORHYDROCODONE, URINE: PRESENT
NOROXYCODONE, URINE: NOT DETECTED
NOROXYMORPHONE, URINE: NOT DETECTED
OXAZEPAM, URINE: NOT DETECTED
OXYCODONE URINE: NOT DETECTED
OXYMORPHONE, URINE: NOT DETECTED
PAIN MANAGEMENT DRUG PANEL INTERP, URINE: NORMAL
PAIN MGT DRUG PANEL, HI RES, UR: NORMAL
PCP,URINE: NOT DETECTED
PHENTERMINE, UR: NOT DETECTED
PREGABALIN: NOT DETECTED
TAPENTADOL, URINE: NOT DETECTED
TAPENTADOL-O-SULFATE, URINE: NOT DETECTED
TEMAZEPAM, URINE: NOT DETECTED
TRAMADOL, URINE: NOT DETECTED
ZOLPIDEM METABOLITE (ZCA), URINE: NOT DETECTED
ZOLPIDEM, URINE: NOT DETECTED

## 2023-11-28 DIAGNOSIS — M47.12 OSTEOARTHRITIS OF CERVICAL SPINE WITH MYELOPATHY: ICD-10-CM

## 2023-11-28 DIAGNOSIS — I10 PRIMARY HYPERTENSION: ICD-10-CM

## 2023-11-28 DIAGNOSIS — M47.817 OSTEOARTHRITIS OF LUMBOSACRAL SPINE: ICD-10-CM

## 2023-11-28 NOTE — TELEPHONE ENCOUNTER
Medication Requested: Hydrocodone     Last visit: 9/18/2023  Next visit: Visit date not found  Last refill: 10/30/2023    Med contract on file:  [x] yes   [] No done, awaiting on physician signature    Last urine drug screen: 10/27/2023  Consistent with medication(s):    [] yes   [] no    Last OARRS ran: unknown    Quantity of medication remaining: few dys    Who will be picking rx up:     Pharmacy if escribed: [facility]

## 2023-11-29 RX ORDER — HYDROCODONE BITARTRATE AND ACETAMINOPHEN 10; 325 MG/1; MG/1
1 TABLET ORAL EVERY 6 HOURS PRN
Qty: 120 TABLET | Refills: 0 | Status: SHIPPED | OUTPATIENT
Start: 2023-11-29 | End: 2023-12-29

## 2023-11-29 NOTE — TELEPHONE ENCOUNTER
Patients wife Mic Gonzales came into office to check on the status of refill request, informed her that prescription awaiting on signature

## 2023-12-26 DIAGNOSIS — M47.12 OSTEOARTHRITIS OF CERVICAL SPINE WITH MYELOPATHY: ICD-10-CM

## 2023-12-26 DIAGNOSIS — M47.817 OSTEOARTHRITIS OF LUMBOSACRAL SPINE: ICD-10-CM

## 2023-12-26 DIAGNOSIS — I10 PRIMARY HYPERTENSION: ICD-10-CM

## 2023-12-26 RX ORDER — HYDROCODONE BITARTRATE AND ACETAMINOPHEN 10; 325 MG/1; MG/1
1 TABLET ORAL EVERY 6 HOURS PRN
Qty: 120 TABLET | Refills: 0 | Status: SHIPPED | OUTPATIENT
Start: 2023-12-26 | End: 2024-01-25

## 2023-12-26 NOTE — TELEPHONE ENCOUNTER
Medication Requested: Norco    Last visit: 9/18/23  Next visit: 1/8/2024  Last refill: 11/28/23    Med contract on file:  [] yes   [x] no    Last urine drug screen: 10/27/23  Consistent with medication(s):    [x] yes   [] no    Last OARRS ran: unknown    Quantity of medication remaining: 3 days worth    Who will be picking rx up: [facility] if escribed: [facility]

## 2024-01-25 DIAGNOSIS — I10 PRIMARY HYPERTENSION: ICD-10-CM

## 2024-01-25 DIAGNOSIS — M47.817 OSTEOARTHRITIS OF LUMBOSACRAL SPINE: ICD-10-CM

## 2024-01-25 DIAGNOSIS — M47.12 OSTEOARTHRITIS OF CERVICAL SPINE WITH MYELOPATHY: ICD-10-CM

## 2024-01-25 NOTE — TELEPHONE ENCOUNTER
Medication Requested: Norco    Last visit: 9/18/23  Next visit: 1/31/2024  Last refill: 12/26/23    Med contract on file:  [] yes   [x] no    Last urine drug screen: 10/27/23  Consistent with medication(s):    [] yes   [] no    Last OARRS ran: unknown    Quantity of medication remaining:     Who will be picking rx up: patient    Pharmacy if escribed: [facility]

## 2024-01-29 RX ORDER — HYDROCODONE BITARTRATE AND ACETAMINOPHEN 10; 325 MG/1; MG/1
1 TABLET ORAL EVERY 6 HOURS PRN
Qty: 120 TABLET | Refills: 0 | Status: SHIPPED | OUTPATIENT
Start: 2024-01-29 | End: 2024-01-31 | Stop reason: SDUPTHER

## 2024-01-29 NOTE — TELEPHONE ENCOUNTER
Patient stopped into the office today for an update. Patient is out of medication. Establishing care with Dr. Tabor on 1/30.

## 2024-01-31 ENCOUNTER — TELEPHONE (OUTPATIENT)
Dept: INTERNAL MEDICINE CLINIC | Age: 84
End: 2024-01-31

## 2024-01-31 ENCOUNTER — OFFICE VISIT (OUTPATIENT)
Dept: INTERNAL MEDICINE CLINIC | Age: 84
End: 2024-01-31
Payer: MEDICARE

## 2024-01-31 VITALS
HEART RATE: 92 BPM | DIASTOLIC BLOOD PRESSURE: 70 MMHG | WEIGHT: 183 LBS | BODY MASS INDEX: 30.45 KG/M2 | SYSTOLIC BLOOD PRESSURE: 120 MMHG | OXYGEN SATURATION: 95 %

## 2024-01-31 DIAGNOSIS — E78.2 MIXED HYPERLIPIDEMIA: Primary | ICD-10-CM

## 2024-01-31 DIAGNOSIS — Z13.9 SCREENING DUE: ICD-10-CM

## 2024-01-31 DIAGNOSIS — M47.12 OSTEOARTHRITIS OF CERVICAL SPINE WITH MYELOPATHY: ICD-10-CM

## 2024-01-31 DIAGNOSIS — Z13.1 SCREENING FOR DIABETES MELLITUS: ICD-10-CM

## 2024-01-31 DIAGNOSIS — F11.99 OPIOID USE, UNSPECIFIED WITH UNSPECIFIED OPIOID-INDUCED DISORDER (HCC): ICD-10-CM

## 2024-01-31 DIAGNOSIS — I10 PRIMARY HYPERTENSION: ICD-10-CM

## 2024-01-31 DIAGNOSIS — M47.817 OSTEOARTHRITIS OF LUMBOSACRAL SPINE: ICD-10-CM

## 2024-01-31 PROCEDURE — 99215 OFFICE O/P EST HI 40 MIN: CPT | Performed by: INTERNAL MEDICINE

## 2024-01-31 PROCEDURE — 1036F TOBACCO NON-USER: CPT | Performed by: INTERNAL MEDICINE

## 2024-01-31 PROCEDURE — G8427 DOCREV CUR MEDS BY ELIG CLIN: HCPCS | Performed by: INTERNAL MEDICINE

## 2024-01-31 PROCEDURE — G8484 FLU IMMUNIZE NO ADMIN: HCPCS | Performed by: INTERNAL MEDICINE

## 2024-01-31 PROCEDURE — G8417 CALC BMI ABV UP PARAM F/U: HCPCS | Performed by: INTERNAL MEDICINE

## 2024-01-31 PROCEDURE — 1123F ACP DISCUSS/DSCN MKR DOCD: CPT | Performed by: INTERNAL MEDICINE

## 2024-01-31 PROCEDURE — 3078F DIAST BP <80 MM HG: CPT | Performed by: INTERNAL MEDICINE

## 2024-01-31 PROCEDURE — 3074F SYST BP LT 130 MM HG: CPT | Performed by: INTERNAL MEDICINE

## 2024-01-31 RX ORDER — NALOXONE HYDROCHLORIDE 4 MG/.1ML
1 SPRAY NASAL PRN
Qty: 2 EACH | Refills: 0 | Status: SHIPPED | OUTPATIENT
Start: 2024-01-31

## 2024-01-31 RX ORDER — HYDROCODONE BITARTRATE AND ACETAMINOPHEN 10; 325 MG/1; MG/1
1 TABLET ORAL EVERY 4 HOURS PRN
Qty: 160 TABLET | Refills: 0 | Status: SHIPPED | OUTPATIENT
Start: 2024-01-31 | End: 2024-03-01

## 2024-01-31 ASSESSMENT — PATIENT HEALTH QUESTIONNAIRE - PHQ9
SUM OF ALL RESPONSES TO PHQ QUESTIONS 1-9: 0
SUM OF ALL RESPONSES TO PHQ QUESTIONS 1-9: 0
2. FEELING DOWN, DEPRESSED OR HOPELESS: 0
SUM OF ALL RESPONSES TO PHQ QUESTIONS 1-9: 0
SUM OF ALL RESPONSES TO PHQ QUESTIONS 1-9: 0
1. LITTLE INTEREST OR PLEASURE IN DOING THINGS: 0
SUM OF ALL RESPONSES TO PHQ9 QUESTIONS 1 & 2: 0

## 2024-01-31 NOTE — TELEPHONE ENCOUNTER
I called mail pharmacy to cancel scripts as patient uses [facility] for pain medication . Also wife stated that they just picked up medication last week and it is to soon to get this medication.

## 2024-01-31 NOTE — PROGRESS NOTES
Visit Information    Have you changed or started any medications since your last visit including any over-the-counter medicines, vitamins, or herbal medicines? no   Are you having any side effects from any of your medications? -  no  Have you stopped taking any of your medications? Is so, why? -  no    Have you seen any other physician or provider since your last visit? No  Have you had any other diagnostic tests since your last visit? No  Have you been seen in the emergency room and/or had an admission to a hospital since we last saw you? No  Have you had your routine dental cleaning in the past 6 months? no    Have you activated your [employer] account? If not, what are your barriers? No:      Patient Care Team:  Gee Tabor MD as PCP - General (Internal Medicine)  Valentino Pillai MD as PCP - Empaneled Provider  Cheryl Castaneda MD as Consulting Physician (Gastroenterology)    Medical History Review  Past Medical, Family, and Social History reviewed and does contribute to the patient presenting condition    Health Maintenance   Topic Date Due    COVID-19 Vaccine (1) Never done    DTaP/Tdap/Td vaccine (1 - Tdap) Never done    Shingles vaccine (1 of 2) Never done    Respiratory Syncytial Virus (RSV) Pregnant or age 60 yrs+ (1 - 1-dose 60+ series) Never done    Pneumococcal 65+ years Vaccine (1 - PCV) Never done    Annual Wellness Visit (Medicare Advantage)  01/01/2024    Lipids  04/03/2024    Depression Screen  04/10/2024    Flu vaccine  Completed    Hepatitis A vaccine  Aged Out    Hepatitis B vaccine  Aged Out    Hib vaccine  Aged Out    Polio vaccine  Aged Out    Meningococcal (ACWY) vaccine  Aged Out       
use, unspecified with unspecified opioid-induced disorder    Benign prostatic hyperplasia with weak urinary stream       Health Maintenance Due   Topic Date Due    COVID-19 Vaccine (1) Never done    DTaP/Tdap/Td vaccine (1 - Tdap) Never done    Shingles vaccine (1 of 2) Never done    Respiratory Syncytial Virus (RSV) Pregnant or age 60 yrs+ (1 - 1-dose 60+ series) Never done    Pneumococcal 65+ years Vaccine (1 - PCV) Never done    Annual Wellness Visit (Medicare Advantage)  01/01/2024       No Known Allergies      MEDICATIONS:     Current Outpatient Medications   Medication Sig Dispense Refill    HYDROcodone-acetaminophen (NORCO)  MG per tablet Take 1 tablet by mouth every 4 hours as needed for Pain for up to 30 days. Max Daily Amount: 6 tablets 160 tablet 0    naloxone 4 MG/0.1ML LIQD nasal spray 1 spray by Nasal route as needed for Opioid Reversal 2 each 0    albuterol sulfate HFA (PROVENTIL;VENTOLIN;PROAIR) 108 (90 Base) MCG/ACT inhaler USE 2 INHALATIONS BY MOUTH 4  TIMES DAILY AS NEEDED 34 g 2    simvastatin (ZOCOR) 40 MG tablet TAKE 1 TABLET BY MOUTH AT NIGHT 90 tablet 3    WIXELA INHUB 250-50 MCG/ACT AEPB diskus inhaler USE 1 INHALATION BY MOUTH  TWICE DAILY 180 each 3    albuterol (ACCUNEB) 1.25 MG/3ML nebulizer solution Inhale 3 mLs into the lungs every 6 hours as needed for Shortness of Breath 360 mL 1     No current facility-administered medications for this visit.       SOCIAL HISTORY    Reviewed and no change from previous record. Nelson  reports that he quit smoking about 38 years ago. His smoking use included cigarettes. He started smoking about 58 years ago. He has a 30.0 pack-year smoking history. He has never used smokeless tobacco.    FAMILY HISTORY:    Reviewed and No change from previous visit  family history is not on file.    REVIEW OF SYSTEMS:    General : Negative for fatigue, weight loss, appetite change  HEENT : Negative for nasal congestion, sneezing, runny nose, sinus

## 2024-02-02 ENCOUNTER — HOSPITAL ENCOUNTER (OUTPATIENT)
Age: 84
Setting detail: SPECIMEN
Discharge: HOME | End: 2024-02-02

## 2024-02-02 DIAGNOSIS — E78.2 MIXED HYPERLIPIDEMIA: ICD-10-CM

## 2024-02-02 DIAGNOSIS — Z13.1 SCREENING FOR DIABETES MELLITUS: ICD-10-CM

## 2024-02-02 DIAGNOSIS — Z13.9 SCREENING DUE: ICD-10-CM

## 2024-02-02 LAB
ALBUMIN SERPL-MCNC: 4.4 G/DL (ref 3.5–5.2)
ALBUMIN/GLOB SERPL: 1 {RATIO} (ref 1–2.5)
ALP SERPL-CCNC: 76 U/L (ref 40–129)
ALT SERPL-CCNC: 21 U/L (ref 10–50)
ANION GAP SERPL CALCULATED.3IONS-SCNC: 11 MMOL/L (ref 9–16)
AST SERPL-CCNC: 22 U/L (ref 10–50)
BILIRUB SERPL-MCNC: 0.5 MG/DL (ref 0–1.2)
BUN SERPL-MCNC: 19 MG/DL (ref 8–23)
CALCIUM SERPL-MCNC: 9.7 MG/DL (ref 8.6–10.4)
CHLORIDE SERPL-SCNC: 99 MMOL/L (ref 98–107)
CHOLEST SERPL-MCNC: 126 MG/DL (ref 0–199)
CHOLESTEROL/HDL RATIO: 2
CO2 SERPL-SCNC: 27 MMOL/L (ref 20–31)
CREAT SERPL-MCNC: 1 MG/DL (ref 0.7–1.2)
ERYTHROCYTE [DISTWIDTH] IN BLOOD BY AUTOMATED COUNT: 13 % (ref 11.8–14.4)
GFR SERPL CREATININE-BSD FRML MDRD: >60 ML/MIN/1.73M2
GLUCOSE P FAST SERPL-MCNC: 96 MG/DL (ref 74–99)
GLUCOSE SERPL-MCNC: 96 MG/DL (ref 74–99)
HCT VFR BLD AUTO: 47.5 % (ref 40.7–50.3)
HDLC SERPL-MCNC: 63 MG/DL
HGB BLD-MCNC: 15.3 G/DL (ref 13–17)
LDLC SERPL CALC-MCNC: 44 MG/DL (ref 0–100)
MCH RBC QN AUTO: 28.8 PG (ref 25.2–33.5)
MCHC RBC AUTO-ENTMCNC: 32.2 G/DL (ref 28.4–34.8)
MCV RBC AUTO: 89.5 FL (ref 82.6–102.9)
NRBC BLD-RTO: 0 PER 100 WBC
PLATELET # BLD AUTO: 232 K/UL (ref 138–453)
PMV BLD AUTO: 9.9 FL (ref 8.1–13.5)
POTASSIUM SERPL-SCNC: 5.2 MMOL/L (ref 3.7–5.3)
PROT SERPL-MCNC: 7.4 G/DL (ref 6.6–8.7)
RBC # BLD AUTO: 5.31 M/UL (ref 4.21–5.77)
SODIUM SERPL-SCNC: 137 MMOL/L (ref 136–145)
TRIGL SERPL-MCNC: 94 MG/DL
VLDLC SERPL CALC-MCNC: 19 MG/DL
WBC OTHER # BLD: 6.8 K/UL (ref 3.5–11.3)

## 2024-02-15 DIAGNOSIS — Z13.220 SCREENING FOR HYPERLIPIDEMIA: ICD-10-CM

## 2024-02-15 RX ORDER — SIMVASTATIN 40 MG
TABLET ORAL
Qty: 100 TABLET | Refills: 2 | Status: SHIPPED | OUTPATIENT
Start: 2024-02-15

## 2024-02-26 ENCOUNTER — TELEPHONE (OUTPATIENT)
Dept: INTERNAL MEDICINE CLINIC | Age: 84
End: 2024-02-26

## 2024-02-26 DIAGNOSIS — M47.817 OSTEOARTHRITIS OF LUMBOSACRAL SPINE: ICD-10-CM

## 2024-02-26 DIAGNOSIS — I10 PRIMARY HYPERTENSION: ICD-10-CM

## 2024-02-26 DIAGNOSIS — M47.12 OSTEOARTHRITIS OF CERVICAL SPINE WITH MYELOPATHY: ICD-10-CM

## 2024-02-26 RX ORDER — HYDROCODONE BITARTRATE AND ACETAMINOPHEN 10; 325 MG/1; MG/1
1 TABLET ORAL EVERY 4 HOURS PRN
Qty: 160 TABLET | Refills: 0 | Status: SHIPPED | OUTPATIENT
Start: 2024-02-26 | End: 2024-02-26

## 2024-02-26 RX ORDER — HYDROCODONE BITARTRATE AND ACETAMINOPHEN 10; 325 MG/1; MG/1
1 TABLET ORAL EVERY 4 HOURS PRN
Qty: 160 TABLET | Refills: 0 | Status: SHIPPED | OUTPATIENT
Start: 2024-02-26 | End: 2024-03-27

## 2024-02-28 ENCOUNTER — OFFICE VISIT (OUTPATIENT)
Dept: INTERNAL MEDICINE CLINIC | Age: 84
End: 2024-02-28

## 2024-02-28 VITALS
HEART RATE: 65 BPM | DIASTOLIC BLOOD PRESSURE: 70 MMHG | OXYGEN SATURATION: 95 % | BODY MASS INDEX: 30.29 KG/M2 | SYSTOLIC BLOOD PRESSURE: 130 MMHG | WEIGHT: 182 LBS

## 2024-02-28 DIAGNOSIS — J42 CHRONIC BRONCHITIS, UNSPECIFIED CHRONIC BRONCHITIS TYPE (HCC): ICD-10-CM

## 2024-02-28 DIAGNOSIS — M79.2 NEUROPATHIC PAIN: ICD-10-CM

## 2024-02-28 DIAGNOSIS — E78.2 MIXED HYPERLIPIDEMIA: Primary | ICD-10-CM

## 2024-02-28 DIAGNOSIS — L98.9 SKIN LESION: ICD-10-CM

## 2024-02-28 RX ORDER — GABAPENTIN 300 MG/1
300 CAPSULE ORAL 2 TIMES DAILY
COMMUNITY
End: 2024-02-28

## 2024-02-28 RX ORDER — PREGABALIN 100 MG/1
100 CAPSULE ORAL 2 TIMES DAILY
Qty: 60 CAPSULE | Refills: 2 | Status: SHIPPED | OUTPATIENT
Start: 2024-02-28 | End: 2024-05-28

## 2024-02-28 NOTE — PROGRESS NOTES
[facility]  [address] [address]  Dept: [phone]  Dept Fax: [phone]    Office Progress/Follow Up Note  Date of patient's visit: 2/28/2024  Patient's Name:  Nelson Marte YOB: 1940            Patient Care Team:  Gee Tabor MD as PCP - General (Internal Medicine)  Gee Tabor MD as PCP - Empaneled Provider  Cheryl Castaneda MD as Consulting Physician (Gastroenterology)    REASON FOR VISIT: Establish care    HISTORY OF PRESENT ILLNESS:      Chief Complaint   Patient presents with    1 Month Follow-Up     Wants discuss inhalers, uses 2 wants to see about just having one        History was obtained from the patient. Nelson Marte is a 83 y.o. is here for establish care.    Multiple arthritis  Continues to report pain in bilateral hips, shoulders, bilateral knees.  This is because he has had multiple surgeries in the past.     COPD.  Denies shortness of breath, has been using his Wixela inhaler twice daily, as needed albuterol.    Hyperlipidemia, tolerating simvastatin.    Patient Active Problem List   Diagnosis    Essential hypertension    Osteoarthritis of lumbosacral spine    Osteoarthritis of cervical spine with myelopathy    Pneumonia due to COVID-19 virus    Ataxia involving legs    Opioid use, unspecified with unspecified opioid-induced disorder    Benign prostatic hyperplasia with weak urinary stream       Health Maintenance Due   Topic Date Due    COVID-19 Vaccine (1) Never done    DTaP/Tdap/Td vaccine (1 - Tdap) Never done    Shingles vaccine (1 of 2) Never done    Respiratory Syncytial Virus (RSV) Pregnant or age 60 yrs+ (1 - 1-dose 60+ series) Never done    Pneumococcal 65+ years Vaccine (1 - PCV) Never done    Annual Wellness Visit (Medicare Advantage)  01/01/2024       No Known Allergies      MEDICATIONS:     Current Outpatient Medications   Medication Sig Dispense Refill    gabapentin (NEURONTIN) 300 MG capsule Take 1

## 2024-02-28 NOTE — PROGRESS NOTES
Visit Information    Have you changed or started any medications since your last visit including any over-the-counter medicines, vitamins, or herbal medicines? no   Are you having any side effects from any of your medications? -  no  Have you stopped taking any of your medications? Is so, why? -  no    Have you seen any other physician or provider since your last visit? No  Have you had any other diagnostic tests since your last visit? No  Have you been seen in the emergency room and/or had an admission to a hospital since we last saw you? No  Have you had your routine dental cleaning in the past 6 months? no    Have you activated your [employer] account? If not, what are your barriers? No:      Patient Care Team:  Gee Tabor MD as PCP - General (Internal Medicine)  Gee Tabor MD as PCP - Empaneled Provider  Cheryl Castaneda MD as Consulting Physician (Gastroenterology)    Medical History Review  Past Medical, Family, and Social History reviewed and does contribute to the patient presenting condition    Health Maintenance   Topic Date Due    COVID-19 Vaccine (1) Never done    DTaP/Tdap/Td vaccine (1 - Tdap) Never done    Shingles vaccine (1 of 2) Never done    Respiratory Syncytial Virus (RSV) Pregnant or age 60 yrs+ (1 - 1-dose 60+ series) Never done    Pneumococcal 65+ years Vaccine (1 - PCV) Never done    Annual Wellness Visit (Medicare Advantage)  01/01/2024    Depression Screen  01/31/2025    Lipids  02/02/2025    Flu vaccine  Completed    Hepatitis A vaccine  Aged Out    Hepatitis B vaccine  Aged Out    Hib vaccine  Aged Out    Polio vaccine  Aged Out    Meningococcal (ACWY) vaccine  Aged Out

## 2024-03-22 ENCOUNTER — TELEPHONE (OUTPATIENT)
Dept: INTERNAL MEDICINE CLINIC | Age: 84
End: 2024-03-22

## 2024-03-22 NOTE — TELEPHONE ENCOUNTER
Patient dropped off [facility] Health Risk Assessment for appointment on 4/8/2024. Placed in Dr. Tabor's box.

## 2024-04-08 ENCOUNTER — OFFICE VISIT (OUTPATIENT)
Dept: INTERNAL MEDICINE CLINIC | Age: 84
End: 2024-04-08
Payer: MEDICARE

## 2024-04-08 VITALS
SYSTOLIC BLOOD PRESSURE: 126 MMHG | OXYGEN SATURATION: 95 % | WEIGHT: 181 LBS | HEART RATE: 67 BPM | BODY MASS INDEX: 30.12 KG/M2 | DIASTOLIC BLOOD PRESSURE: 84 MMHG

## 2024-04-08 DIAGNOSIS — M19.90 ARTHRITIS: Primary | ICD-10-CM

## 2024-04-08 DIAGNOSIS — J44.9 CHRONIC OBSTRUCTIVE PULMONARY DISEASE, UNSPECIFIED COPD TYPE (HCC): ICD-10-CM

## 2024-04-08 PROCEDURE — 3074F SYST BP LT 130 MM HG: CPT | Performed by: INTERNAL MEDICINE

## 2024-04-08 PROCEDURE — 3079F DIAST BP 80-89 MM HG: CPT | Performed by: INTERNAL MEDICINE

## 2024-04-08 PROCEDURE — G0439 PPPS, SUBSEQ VISIT: HCPCS | Performed by: INTERNAL MEDICINE

## 2024-04-08 PROCEDURE — 1123F ACP DISCUSS/DSCN MKR DOCD: CPT | Performed by: INTERNAL MEDICINE

## 2024-04-08 ASSESSMENT — PATIENT HEALTH QUESTIONNAIRE - PHQ9
SUM OF ALL RESPONSES TO PHQ QUESTIONS 1-9: 0
1. LITTLE INTEREST OR PLEASURE IN DOING THINGS: NOT AT ALL
SUM OF ALL RESPONSES TO PHQ QUESTIONS 1-9: 0
2. FEELING DOWN, DEPRESSED OR HOPELESS: NOT AT ALL
SUM OF ALL RESPONSES TO PHQ9 QUESTIONS 1 & 2: 0
SUM OF ALL RESPONSES TO PHQ QUESTIONS 1-9: 0
SUM OF ALL RESPONSES TO PHQ QUESTIONS 1-9: 0

## 2024-04-08 ASSESSMENT — LIFESTYLE VARIABLES
HOW OFTEN DO YOU HAVE A DRINK CONTAINING ALCOHOL: MONTHLY OR LESS
HOW MANY STANDARD DRINKS CONTAINING ALCOHOL DO YOU HAVE ON A TYPICAL DAY: 1 OR 2

## 2024-04-08 NOTE — PROGRESS NOTES
[facility]  [address] [address]  Dept: [phone]  Dept Fax: [phone]    Office Progress/Follow Up Note  Date of patient's visit: 4/8/2024  Patient's Name:  Nelson Marte YOB: 1940            Patient Care Team:  Gee Tabor MD as PCP - General (Internal Medicine)  [facility]  Cheryl Castaneda MD as Consulting Physician (Gastroenterology)    REASON FOR VISIT:  annual wellness visit    HISTORY OF PRESENT ILLNESS:      Chief Complaint   Patient presents with    Medicare AWV        History was obtained from the patient. Nelson Marte is a 83 y.o. is here for AWV.    Reports aches in joint- chronic, no change  No other concerns     Multiple joint arthritis  Continues to report pain in bilateral hips, shoulders, bilateral knees.  This is because he has had multiple surgeries in the past.     COPD.  Denies shortness of breath, has been using his Wixela inhaler twice daily, as needed albuterol.    Hyperlipidemia, tolerating simvastatin.    Patient Active Problem List   Diagnosis    Essential hypertension    Osteoarthritis of lumbosacral spine    Osteoarthritis of cervical spine with myelopathy    Pneumonia due to COVID-19 virus    Ataxia involving legs    Opioid use, unspecified with unspecified opioid-induced disorder    Benign prostatic hyperplasia with weak urinary stream       Health Maintenance Due   Topic Date Due    COVID-19 Vaccine (1) Never done    Pneumococcal 65+ years Vaccine (1 of 2 - PCV) Never done    DTaP/Tdap/Td vaccine (1 - Tdap) Never done    Shingles vaccine (1 of 2) Never done    Respiratory Syncytial Virus (RSV) Pregnant or age 60 yrs+ (1 - 1-dose 60+ series) Never done    Annual Wellness Visit (Medicare Advantage)  01/01/2024       No Known Allergies      MEDICATIONS:     Current Outpatient Medications   Medication Sig Dispense Refill    pregabalin (LYRICA) 100 MG capsule Take 1 capsule by

## 2024-04-08 NOTE — PROGRESS NOTES
Visit Information    Have you changed or started any medications since your last visit including any over-the-counter medicines, vitamins, or herbal medicines? no   Are you having any side effects from any of your medications? -  no  Have you stopped taking any of your medications? Is so, why? -  no    Have you seen any other physician or provider since your last visit? No  Have you had any other diagnostic tests since your last visit? No  Have you been seen in the emergency room and/or had an admission to a hospital since we last saw you? No  Have you had your routine dental cleaning in the past 6 months? no    Have you activated your [employer] account? If not, what are your barriers? No:      Patient Care Team:  Gee Tabor MD as PCP - General (Internal Medicine)  Gee Tabor MD as PCP - Empaneled Provider  Cheryl Castaneda MD as Consulting Physician (Gastroenterology)    Medical History Review  Past Medical, Family, and Social History reviewed and does contribute to the patient presenting condition    Health Maintenance   Topic Date Due    COVID-19 Vaccine (1) Never done    Pneumococcal 65+ years Vaccine (1 of 2 - PCV) Never done    DTaP/Tdap/Td vaccine (1 - Tdap) Never done    Shingles vaccine (1 of 2) Never done    Respiratory Syncytial Virus (RSV) Pregnant or age 60 yrs+ (1 - 1-dose 60+ series) Never done    Annual Wellness Visit (Medicare Advantage)  01/01/2024    Depression Screen  01/31/2025    Lipids  02/02/2025    Flu vaccine  Completed    Hepatitis A vaccine  Aged Out    Hepatitis B vaccine  Aged Out    Hib vaccine  Aged Out    Polio vaccine  Aged Out    Meningococcal (ACWY) vaccine  Aged Out

## 2024-04-26 DIAGNOSIS — M47.817 OSTEOARTHRITIS OF LUMBOSACRAL SPINE: ICD-10-CM

## 2024-04-26 DIAGNOSIS — M47.12 OSTEOARTHRITIS OF CERVICAL SPINE WITH MYELOPATHY: ICD-10-CM

## 2024-04-26 DIAGNOSIS — I10 PRIMARY HYPERTENSION: ICD-10-CM

## 2024-04-26 RX ORDER — HYDROCODONE BITARTRATE AND ACETAMINOPHEN 10; 325 MG/1; MG/1
1 TABLET ORAL EVERY 4 HOURS PRN
Qty: 160 TABLET | Refills: 0 | Status: SHIPPED | OUTPATIENT
Start: 2024-04-26 | End: 2024-05-26

## 2024-04-26 NOTE — TELEPHONE ENCOUNTER
Medication Requested: Norco    Last visit: 4/8/2024  Next visit: 10/9/2024  Last refill: 2/28/2024    Med contract on file:  [] yes   [x] no    Last urine drug screen: 10/27/2023  Consistent with medication(s):    [] yes   [] no    Last OARRS ran:     Quantity of medication remaining: 3    Who will be picking rx up: Patient    Ph[facility] if escribed[facility]

## 2024-05-09 DIAGNOSIS — M79.2 NEUROPATHIC PAIN: ICD-10-CM

## 2024-05-09 DIAGNOSIS — F17.200 TOBACCO DEPENDENCY: ICD-10-CM

## 2024-05-09 RX ORDER — FLUTICASONE PROPIONATE AND SALMETEROL 250; 50 UG/1; UG/1
1 POWDER RESPIRATORY (INHALATION) 2 TIMES DAILY
Qty: 180 EACH | Refills: 3 | Status: SHIPPED | OUTPATIENT
Start: 2024-05-09

## 2024-05-09 RX ORDER — PREGABALIN 100 MG/1
100 CAPSULE ORAL 2 TIMES DAILY
Qty: 60 CAPSULE | Refills: 2 | Status: SHIPPED | OUTPATIENT
Start: 2024-05-09 | End: 2024-08-07

## 2024-05-28 DIAGNOSIS — M47.817 OSTEOARTHRITIS OF LUMBOSACRAL SPINE: ICD-10-CM

## 2024-05-28 DIAGNOSIS — M47.12 OSTEOARTHRITIS OF CERVICAL SPINE WITH MYELOPATHY: ICD-10-CM

## 2024-05-28 DIAGNOSIS — I10 PRIMARY HYPERTENSION: ICD-10-CM

## 2024-05-28 RX ORDER — HYDROCODONE BITARTRATE AND ACETAMINOPHEN 10; 325 MG/1; MG/1
1 TABLET ORAL EVERY 4 HOURS PRN
Qty: 160 TABLET | Refills: 0 | Status: SHIPPED | OUTPATIENT
Start: 2024-05-28 | End: 2024-05-29 | Stop reason: SDUPTHER

## 2024-05-28 NOTE — TELEPHONE ENCOUNTER
Medication Requested: Norco    Last visit: 4/8/24  Next visit: 10/9/2024  Last refill: 4/29/24    Med contract on file:  [x] yes   [] no    Last urine drug screen: 10/27/23  Consistent with medication(s):    [] yes   [] no    Last OARRS ran: unknown    Quantity of medication remaining:     Who will be picking rx up: patient or spouse     Ph[facility] if escribed[facility]

## 2024-05-29 RX ORDER — HYDROCODONE BITARTRATE AND ACETAMINOPHEN 10; 325 MG/1; MG/1
1 TABLET ORAL EVERY 4 HOURS PRN
Qty: 160 TABLET | Refills: 0 | Status: SHIPPED | OUTPATIENT
Start: 2024-05-29 | End: 2024-06-28

## 2024-06-06 ENCOUNTER — OFFICE VISIT (OUTPATIENT)
Dept: ORTHOPEDIC SURGERY | Age: 84
End: 2024-06-06
Payer: MEDICARE

## 2024-06-06 VITALS — HEIGHT: 64 IN | RESPIRATION RATE: 14 BRPM | BODY MASS INDEX: 30.73 KG/M2 | WEIGHT: 180 LBS

## 2024-06-06 DIAGNOSIS — M25.511 RIGHT SHOULDER PAIN, UNSPECIFIED CHRONICITY: Primary | ICD-10-CM

## 2024-06-06 PROCEDURE — 99213 OFFICE O/P EST LOW 20 MIN: CPT | Performed by: ORTHOPAEDIC SURGERY

## 2024-06-06 PROCEDURE — 1123F ACP DISCUSS/DSCN MKR DOCD: CPT | Performed by: ORTHOPAEDIC SURGERY

## 2024-06-06 PROCEDURE — G8417 CALC BMI ABV UP PARAM F/U: HCPCS | Performed by: ORTHOPAEDIC SURGERY

## 2024-06-06 PROCEDURE — 1036F TOBACCO NON-USER: CPT | Performed by: ORTHOPAEDIC SURGERY

## 2024-06-06 PROCEDURE — G8428 CUR MEDS NOT DOCUMENT: HCPCS | Performed by: ORTHOPAEDIC SURGERY

## 2024-06-06 NOTE — PROGRESS NOTES
ORTHOPEDIC PATIENT EVALUATION      HPI / Chief Complaint  Nelson Marte is a 83 y.o. male who presents for evaluation of his right shoulder.  He states that he has been having pain in the right shoulder for quite some time now.  He had a right total shoulder arthroplasty performed while out in [place] back in 2012.  Again he has been dealing with pain for quite some time but recently tripped over her cat landing on a couch and aggravated the shoulder.  His pain is constant is a dull ache but becomes more intense or worse with movement and use.  He has a hard time raising the arm overhead as a result of this pain.  He denies having any fevers, chills, sweats or any constitutional symptoms.    Past Medical History  Nelson  has a past medical history of Arthritis, Asthma, Hyperlipemia, and Hypertension.    Past Surgical History  Nelson  has a past surgical history that includes Total knee arthroplasty and Total shoulder arthroplasty (Bilateral).    Current Medications  Current Outpatient Medications   Medication Sig Dispense Refill    HYDROcodone-acetaminophen (NORCO)  MG per tablet Take 1 tablet by mouth every 4 hours as needed for Pain for up to 30 days. Max Daily Amount: 6 tablets 160 tablet 0    pregabalin (LYRICA) 100 MG capsule Take 1 capsule by mouth 2 times daily for 90 days. Max Daily Amount: 200 mg 60 capsule 2    fluticasone-salmeterol (WIXELA INHUB) 250-50 MCG/ACT AEPB diskus inhaler Inhale 1 puff into the lungs 2 times daily 180 each 3    simvastatin (ZOCOR) 40 MG tablet TAKE 1 TABLET BY MOUTH AT NIGHT 100 tablet 2    naloxone 4 MG/0.1ML LIQD nasal spray 1 spray by Nasal route as needed for Opioid Reversal 2 each 0    albuterol sulfate HFA (PROVENTIL;VENTOLIN;PROAIR) 108 (90 Base) MCG/ACT inhaler USE 2 INHALATIONS BY MOUTH 4  TIMES DAILY AS NEEDED 34 g 2    albuterol (ACCUNEB) 1.25 MG/3ML nebulizer solution Inhale 3 mLs into the lungs every 6 hours as needed for Shortness of Breath 360 mL 1     No

## 2024-07-02 DIAGNOSIS — M47.12 OSTEOARTHRITIS OF CERVICAL SPINE WITH MYELOPATHY: ICD-10-CM

## 2024-07-02 DIAGNOSIS — M47.817 OSTEOARTHRITIS OF LUMBOSACRAL SPINE: ICD-10-CM

## 2024-07-02 DIAGNOSIS — I10 PRIMARY HYPERTENSION: ICD-10-CM

## 2024-07-02 RX ORDER — HYDROCODONE BITARTRATE AND ACETAMINOPHEN 10; 325 MG/1; MG/1
1 TABLET ORAL EVERY 4 HOURS PRN
Qty: 160 TABLET | Refills: 0 | OUTPATIENT
Start: 2024-07-02 | End: 2024-08-01

## 2024-07-02 NOTE — TELEPHONE ENCOUNTER
Medication Requested: HDYROCODONE    Last visit: 4/8/2024  Next visit: 8/14/2024  Last refill: 5/29/2024    Med contract on file:  [x] yes   [] no    Last urine drug screen: 10/27/2023  Consistent with medication(s):    [] yes   [] no    Last OARRS ran:UNKNOWN     Quantity of medication remaining:     Who will be picking rx up:     Pharmacy if escribed: [facility]

## 2024-07-03 RX ORDER — HYDROCODONE BITARTRATE AND ACETAMINOPHEN 10; 325 MG/1; MG/1
1 TABLET ORAL EVERY 4 HOURS PRN
Qty: 160 TABLET | Refills: 0 | Status: SHIPPED | OUTPATIENT
Start: 2024-07-03 | End: 2024-08-02

## 2024-07-03 NOTE — TELEPHONE ENCOUNTER
Patients wife came into office to check on the status of refill request, informed her of the situation with questioning the quantity.Pended last Rx from 5/29    Per wife informed that  it was discussed with Dr Tabor that patient is to take to 6 max daily prn , noted on 1/31/2024 appointment.

## 2024-07-31 DIAGNOSIS — M47.12 OSTEOARTHRITIS OF CERVICAL SPINE WITH MYELOPATHY: ICD-10-CM

## 2024-07-31 DIAGNOSIS — M47.817 OSTEOARTHRITIS OF LUMBOSACRAL SPINE: ICD-10-CM

## 2024-07-31 DIAGNOSIS — I10 PRIMARY HYPERTENSION: ICD-10-CM

## 2024-07-31 RX ORDER — HYDROCODONE BITARTRATE AND ACETAMINOPHEN 10; 325 MG/1; MG/1
1 TABLET ORAL EVERY 4 HOURS PRN
Qty: 160 TABLET | Refills: 0 | Status: SHIPPED | OUTPATIENT
Start: 2024-07-31 | End: 2024-08-30

## 2024-08-14 ENCOUNTER — OFFICE VISIT (OUTPATIENT)
Dept: INTERNAL MEDICINE CLINIC | Age: 84
End: 2024-08-14

## 2024-08-14 VITALS
WEIGHT: 181 LBS | DIASTOLIC BLOOD PRESSURE: 60 MMHG | SYSTOLIC BLOOD PRESSURE: 130 MMHG | OXYGEN SATURATION: 94 % | BODY MASS INDEX: 31.05 KG/M2 | HEART RATE: 60 BPM

## 2024-08-14 DIAGNOSIS — M19.90 ARTHRITIS: Primary | ICD-10-CM

## 2024-08-14 RX ORDER — FLUTICASONE FUROATE, UMECLIDINIUM BROMIDE AND VILANTEROL TRIFENATATE 200; 62.5; 25 UG/1; UG/1; UG/1
1 POWDER RESPIRATORY (INHALATION) DAILY
Qty: 28 EACH | Refills: 2 | Status: SHIPPED | OUTPATIENT
Start: 2024-08-14

## 2024-08-14 SDOH — ECONOMIC STABILITY: FOOD INSECURITY: WITHIN THE PAST 12 MONTHS, THE FOOD YOU BOUGHT JUST DIDN'T LAST AND YOU DIDN'T HAVE MONEY TO GET MORE.: NEVER TRUE

## 2024-08-14 SDOH — ECONOMIC STABILITY: FOOD INSECURITY: WITHIN THE PAST 12 MONTHS, YOU WORRIED THAT YOUR FOOD WOULD RUN OUT BEFORE YOU GOT MONEY TO BUY MORE.: NEVER TRUE

## 2024-08-14 SDOH — ECONOMIC STABILITY: INCOME INSECURITY: HOW HARD IS IT FOR YOU TO PAY FOR THE VERY BASICS LIKE FOOD, HOUSING, MEDICAL CARE, AND HEATING?: NOT HARD AT ALL

## 2024-08-14 NOTE — PROGRESS NOTES
Visit Information    Have you changed or started any medications since your last visit including any over-the-counter medicines, vitamins, or herbal medicines? no   Are you having any side effects from any of your medications? -  no  Have you stopped taking any of your medications? Is so, why? -  no    Have you seen any other physician or provider since your last visit? Yes - Records Obtained  Have you had any other diagnostic tests since your last visit? No  Have you been seen in the emergency room and/or had an admission to a hospital since we last saw you? No  Have you had your routine dental cleaning in the past 6 months? no    Have you activated your [employer] account? If not, what are your barriers? No:      Patient Care Team:  Gee Tabor MD as PCP - General (Internal Medicine)  Gee Tabor MD as PCP - Empaneled Provider  Cheryl Castaneda MD as Consulting Physician (Gastroenterology)    Medical History Review  Past Medical, Family, and Social History reviewed and does contribute to the patient presenting condition    Health Maintenance   Topic Date Due    Pneumococcal 65+ years Vaccine (1 of 2 - PCV) Never done    DTaP/Tdap/Td vaccine (1 - Tdap) Never done    Shingles vaccine (1 of 2) Never done    Respiratory Syncytial Virus (RSV) Pregnant or age 60 yrs+ (1 - 1-dose 60+ series) Never done    COVID-19 Vaccine (1 - 2023-24 season) Never done    Flu vaccine (1) 08/01/2024    Lipids  02/02/2025    Depression Screen  04/08/2025    Annual Wellness Visit (Medicare Advantage)  Completed    Hepatitis A vaccine  Aged Out    Hepatitis B vaccine  Aged Out    Hib vaccine  Aged Out    Polio vaccine  Aged Out    Meningococcal (ACWY) vaccine  Aged Out

## 2024-08-14 NOTE — PROGRESS NOTES
[facility]  [address] [address]  Dept: [phone]  Dept Fax: [phone]    Office Progress/Follow Up Note  Date of patient's visit: 8/14/2024  Patient's Name:  Nelson Marte YOB: 1940            Patient Care Team:  Gee Tabor MD as PCP - General (Internal Medicine)  [facility]  Cheryl Castaneda MD as Consulting Physician (Gastroenterology)    REASON FOR VISIT:  follow up    HISTORY OF PRESENT ILLNESS:      Chief Complaint   Patient presents with    3 Month Follow-Up             History was obtained from the patient. Nelson Marte is a 83 y.o. is here for follow up.     Reports aches in joint- chronic, no change  No other concerns     Multiple joint arthritis  Continues to report pain in bilateral hips, shoulders, bilateral knees.  This is because he has had multiple surgeries in the past.     COPD.  Denies shortness of breath, has been using his Wixela inhaler twice daily, as needed albuterol.    Hyperlipidemia, tolerating simvastatin.    Patient Active Problem List   Diagnosis    Essential hypertension    Osteoarthritis of lumbosacral spine    Osteoarthritis of cervical spine with myelopathy    Pneumonia due to COVID-19 virus    Ataxia involving legs    Opioid use, unspecified with unspecified opioid-induced disorder    Benign prostatic hyperplasia with weak urinary stream       Health Maintenance Due   Topic Date Due    Pneumococcal 65+ years Vaccine (1 of 2 - PCV) Never done    DTaP/Tdap/Td vaccine (1 - Tdap) Never done    Shingles vaccine (1 of 2) Never done    Respiratory Syncytial Virus (RSV) Pregnant or age 60 yrs+ (1 - 1-dose 60+ series) Never done    COVID-19 Vaccine (1 - 2023-24 season) Never done    Flu vaccine (1) 08/01/2024       No Known Allergies      MEDICATIONS:     Current Outpatient Medications   Medication Sig Dispense Refill    fluticasone-umeclidin-vilant (TRELEGY ELLIPTA)

## 2024-08-28 DIAGNOSIS — M47.817 OSTEOARTHRITIS OF LUMBOSACRAL SPINE: ICD-10-CM

## 2024-08-28 DIAGNOSIS — I10 PRIMARY HYPERTENSION: ICD-10-CM

## 2024-08-28 DIAGNOSIS — M47.12 OSTEOARTHRITIS OF CERVICAL SPINE WITH MYELOPATHY: ICD-10-CM

## 2024-08-28 RX ORDER — HYDROCODONE BITARTRATE AND ACETAMINOPHEN 10; 325 MG/1; MG/1
1 TABLET ORAL EVERY 4 HOURS PRN
Qty: 160 TABLET | Refills: 0 | Status: SHIPPED | OUTPATIENT
Start: 2024-08-28 | End: 2024-09-27

## 2024-08-28 NOTE — TELEPHONE ENCOUNTER
Medication Requested: Norco    Last visit: 8/14/24  Next visit: 11/15/2024  Last refill: 7/31/24    Med contract on file:  [] yes   [x] no    Last urine drug screen: 10/27/23  Consistent with medication(s):    [x] yes   [] no    Last OARRS ran: unknown    Quantity of medication remaining: 10 pills    Who will be picking rx up: Nelson    Ph[facility] if escribed[facility]

## 2024-09-24 RX ORDER — ALBUTEROL SULFATE 1.25 MG/3ML
1 SOLUTION RESPIRATORY (INHALATION) EVERY 6 HOURS PRN
Qty: 360 ML | Refills: 1 | Status: SHIPPED | OUTPATIENT
Start: 2024-09-24 | End: 2024-11-23

## 2024-09-27 DIAGNOSIS — M47.12 OSTEOARTHRITIS OF CERVICAL SPINE WITH MYELOPATHY: ICD-10-CM

## 2024-09-27 DIAGNOSIS — M47.817 OSTEOARTHRITIS OF LUMBOSACRAL SPINE: ICD-10-CM

## 2024-09-27 DIAGNOSIS — I10 PRIMARY HYPERTENSION: ICD-10-CM

## 2024-09-27 RX ORDER — HYDROCODONE BITARTRATE AND ACETAMINOPHEN 10; 325 MG/1; MG/1
1 TABLET ORAL EVERY 4 HOURS PRN
Qty: 160 TABLET | Refills: 0 | Status: SHIPPED | OUTPATIENT
Start: 2024-09-27 | End: 2024-10-27

## 2024-10-08 DIAGNOSIS — M79.2 NEUROPATHIC PAIN: ICD-10-CM

## 2024-10-08 RX ORDER — PREGABALIN 100 MG/1
100 CAPSULE ORAL 2 TIMES DAILY
Qty: 30 CAPSULE | Refills: 0 | Status: SHIPPED | OUTPATIENT
Start: 2024-10-08 | End: 2024-10-23

## 2024-10-08 NOTE — TELEPHONE ENCOUNTER
Medication Requested: Pregabalin    Last visit: 8/14/24  Next visit: 10/10/2024  Last refill: 5/9/2024    Med contract on file:  [x] yes   [] no    Last urine drug screen: 10/27/2023  Consistent with medication(s):    [x] yes   [] no    Last OARRS ran: unknown    Quantity of medication remaining: unknown    Who will be picking rx up: Nelson    Ph[facility] if escribed: [facility]

## 2024-10-10 ENCOUNTER — OFFICE VISIT (OUTPATIENT)
Dept: INTERNAL MEDICINE CLINIC | Age: 84
End: 2024-10-10

## 2024-10-10 ENCOUNTER — TELEPHONE (OUTPATIENT)
Dept: INTERNAL MEDICINE CLINIC | Age: 84
End: 2024-10-10

## 2024-10-10 VITALS
BODY MASS INDEX: 30.9 KG/M2 | DIASTOLIC BLOOD PRESSURE: 62 MMHG | OXYGEN SATURATION: 95 % | HEART RATE: 64 BPM | WEIGHT: 181 LBS | SYSTOLIC BLOOD PRESSURE: 128 MMHG | HEIGHT: 64 IN

## 2024-10-10 DIAGNOSIS — F11.99 OPIOID USE, UNSPECIFIED WITH UNSPECIFIED OPIOID-INDUCED DISORDER (HCC): ICD-10-CM

## 2024-10-10 DIAGNOSIS — I10 ESSENTIAL HYPERTENSION: ICD-10-CM

## 2024-10-10 DIAGNOSIS — Z76.89 ENCOUNTER TO ESTABLISH CARE: Primary | ICD-10-CM

## 2024-10-10 DIAGNOSIS — J44.9 CHRONIC OBSTRUCTIVE PULMONARY DISEASE, UNSPECIFIED COPD TYPE (HCC): ICD-10-CM

## 2024-10-10 RX ORDER — FLUTICASONE FUROATE, UMECLIDINIUM BROMIDE AND VILANTEROL TRIFENATATE 200; 62.5; 25 UG/1; UG/1; UG/1
1 POWDER RESPIRATORY (INHALATION) DAILY
Qty: 28 EACH | Refills: 2 | Status: SHIPPED | OUTPATIENT
Start: 2024-10-10

## 2024-10-10 NOTE — PROGRESS NOTES
[facility]  [address] [address]  Dept: [phone]  Dept Fax: [phone]    OFFICE VISIT NOTE  Date of patient's visit: 10/10/2024  Patient's Name:  Nelson Marte YOB: 1940            Patient Care Team:  Ami Sauceda MD as PCP - General (Internal Medicine)  Gee Tabor MD as PCP - Empaneled Provider  Cheryl Castaneda MD as Consulting Physician (Gastroenterology)  _________________________________________    ________________________________________________  Chief Complaint:   Establish Care    _______________________________________________  History of Presenting Illness:  History was obtained from the patient. Nelson Marte is a 84 y.o. male.     BP: normal   HR: normal     Establish care with me.     COPD  Smoker- Quit in 1985   Prior to that 2 packs/day for around 23 years.   Stable  On albuterol PRN  On trelegy scheduled.  No exacerbations recently.    HLD   Stable  On zocor, tolerating.    Arthritis involving multiple joints.  S/p multiple surgeries in the past.  Did not tolerate gabapentin in the past.  On Norco for so many years.  Asked patient to follow-up with pain management specialist.  He is not open to the idea of following with the pain specialist for his pain.  Discussed with patient the importance of it.    History of vein stripping for varicose veins.    Lab Results   Component Value Date    HGB 15.3 02/02/2024    CHOL 126 02/02/2024    HDL 63 02/02/2024    LDL 44 02/02/2024    TRIG 94 02/02/2024    CREATININE 1.0 02/02/2024     _____________________________________________________  Past Medical/Surgical History:        Diagnosis Date    Arthritis     Asthma     Hyperlipemia     Hypertension            Procedure Laterality Date    SHOULDER ARTHROPLASTY Bilateral     TOTAL KNEE ARTHROPLASTY       _____________________________________________________  Health Maintenance Due   Topic Date Due    Pneumococcal 65+ years

## 2024-10-23 DIAGNOSIS — I10 PRIMARY HYPERTENSION: ICD-10-CM

## 2024-10-23 DIAGNOSIS — M47.12 OSTEOARTHRITIS OF CERVICAL SPINE WITH MYELOPATHY: ICD-10-CM

## 2024-10-23 DIAGNOSIS — M47.817 OSTEOARTHRITIS OF LUMBOSACRAL SPINE: ICD-10-CM

## 2024-10-23 RX ORDER — HYDROCODONE BITARTRATE AND ACETAMINOPHEN 10; 325 MG/1; MG/1
1 TABLET ORAL EVERY 6 HOURS PRN
Qty: 28 TABLET | Refills: 0 | Status: SHIPPED | OUTPATIENT
Start: 2024-10-23 | End: 2024-10-30

## 2024-10-23 NOTE — TELEPHONE ENCOUNTER
Medication Requested: Norco    Last visit: 10/10/24  Next visit: 1/10/2025  Last refill: 24    Med contract on file:  [x] yes   [] No   Signed 24 with Dr. Tabor    Last urine drug screen: 10/27/23  Consistent with medication(s):    [] yes   [] no    Last OARRS ran: unknown    Quantity of medication remainin    Who will be picking rx up: Patient    [facility]rib[facility]

## 2024-10-30 DIAGNOSIS — M79.2 NEUROPATHIC PAIN: ICD-10-CM

## 2024-10-30 RX ORDER — PREGABALIN 100 MG/1
100 CAPSULE ORAL 2 TIMES DAILY
Qty: 30 CAPSULE | Refills: 0 | Status: SHIPPED | OUTPATIENT
Start: 2024-10-30 | End: 2024-11-14

## 2024-11-01 DIAGNOSIS — Z13.220 SCREENING FOR HYPERLIPIDEMIA: ICD-10-CM

## 2024-11-01 RX ORDER — SIMVASTATIN 40 MG
40 TABLET ORAL NIGHTLY
Qty: 100 TABLET | Refills: 2 | Status: SHIPPED | OUTPATIENT
Start: 2024-11-01

## 2024-11-04 ENCOUNTER — HOSPITAL ENCOUNTER (OUTPATIENT)
Age: 84
Discharge: HOME | End: 2024-11-06
Payer: MEDICARE

## 2024-11-04 ENCOUNTER — HOSPITAL ENCOUNTER (OUTPATIENT)
Dept: PAIN MANAGEMENT | Age: 84
Discharge: HOME | End: 2024-11-04
Payer: MEDICARE

## 2024-11-04 ENCOUNTER — HOSPITAL ENCOUNTER (OUTPATIENT)
Dept: GENERAL RADIOLOGY | Age: 84
Discharge: HOME | End: 2024-11-06
Attending: STUDENT IN AN ORGANIZED HEALTH CARE EDUCATION/TRAINING PROGRAM
Payer: MEDICARE

## 2024-11-04 VITALS — HEIGHT: 64 IN | WEIGHT: 181 LBS | BODY MASS INDEX: 30.9 KG/M2

## 2024-11-04 DIAGNOSIS — Z79.891 CHRONIC USE OF OPIATE FOR THERAPEUTIC PURPOSE: ICD-10-CM

## 2024-11-04 DIAGNOSIS — M79.18 MYOFASCIAL PAIN SYNDROME: ICD-10-CM

## 2024-11-04 DIAGNOSIS — M54.12 CERVICAL RADICULOPATHY: Primary | ICD-10-CM

## 2024-11-04 DIAGNOSIS — M54.12 CERVICAL RADICULOPATHY: ICD-10-CM

## 2024-11-04 DIAGNOSIS — M96.1 CERVICAL POST-LAMINECTOMY SYNDROME: ICD-10-CM

## 2024-11-04 PROCEDURE — G0481 DRUG TEST DEF 8-14 CLASSES: HCPCS

## 2024-11-04 PROCEDURE — 80307 DRUG TEST PRSMV CHEM ANLYZR: CPT

## 2024-11-04 PROCEDURE — 99204 OFFICE O/P NEW MOD 45 MIN: CPT | Performed by: STUDENT IN AN ORGANIZED HEALTH CARE EDUCATION/TRAINING PROGRAM

## 2024-11-04 PROCEDURE — 72040 X-RAY EXAM NECK SPINE 2-3 VW: CPT

## 2024-11-04 PROCEDURE — 99203 OFFICE O/P NEW LOW 30 MIN: CPT

## 2024-11-04 ASSESSMENT — PAIN SCALES - GENERAL: PAINLEVEL_OUTOF10: 5

## 2024-11-04 NOTE — PROGRESS NOTES
Chronic Pain Clinic Note     Encounter Date: 2024     SUBJECTIVE:  Chief Complaint   Patient presents with    New Patient       History of Present Illness:   Nelson Marte is a 84 y.o. male who presents with neck pain.    Medication Refill: n/a    Current Complaints of Pain:   Location: neck  Radiation: Into left arm  Severity:  Moderate  Pain Numerical Score - 5 today     Average:  5    Highest: 5  Lowest:  4  Character/Quality: Complains of pain that is    Timing: Constant  Associated symptoms: none  Numbness:    Weakness:    Exacerbating factors: ADLs  Alleviating factors:  Norco   Length of time pain has been present: Started about 20 years ago   Inciting event/injury:    Bowel/Bladder incontinence:  no  Falls:  none in the past month   Physical Therapy: No - pt states he can't do PT, he failed     History of Interventions:   Surgery: No previous lumbar/cervical surgeries  Injections: years ago, pt is not sure who did them     Imaging:    None on file     Past Medical History:   Diagnosis Date    Arthritis     Asthma     Hyperlipemia     Hypertension        Past Surgical History:   Procedure Laterality Date    SHOULDER ARTHROPLASTY Bilateral     TOTAL KNEE ARTHROPLASTY         History reviewed. No pertinent family history.    Social History     Socioeconomic History    Marital status:      Spouse name: Not on file    Number of children: Not on file    Years of education: Not on file    Highest education level: Not on file   Occupational History    Not on file   Tobacco Use    Smoking status: Former     Current packs/day: 0.00     Average packs/day: 1.5 packs/day for 20.0 years (30.0 ttl pk-yrs)     Types: Cigarettes     Start date:      Quit date:      Years since quittin.8    Smokeless tobacco: Never   Substance and Sexual Activity    Alcohol use: Yes     Alcohol/week: 1.0 standard drink of alcohol     Types: 1 Glasses of wine per week     Comment: COUPLE TIMES A WEEK WITH DINNER

## 2024-11-05 DIAGNOSIS — M47.12 OSTEOARTHRITIS OF CERVICAL SPINE WITH MYELOPATHY: ICD-10-CM

## 2024-11-05 DIAGNOSIS — I10 PRIMARY HYPERTENSION: ICD-10-CM

## 2024-11-05 DIAGNOSIS — M47.817 OSTEOARTHRITIS OF LUMBOSACRAL SPINE: ICD-10-CM

## 2024-11-05 RX ORDER — HYDROCODONE BITARTRATE AND ACETAMINOPHEN 10; 325 MG/1; MG/1
1 TABLET ORAL EVERY 6 HOURS PRN
Qty: 28 TABLET | Refills: 0 | Status: SHIPPED | OUTPATIENT
Start: 2024-11-05 | End: 2024-11-12

## 2024-11-05 NOTE — TELEPHONE ENCOUNTER
Read pain management note, they ordered urine drug screen for the patient.  If urine drug screen appropriate, they will take over his opioid management.  Get the urine drug screen done.  I will give a refill for 7 days.  Please take medications as prescribed.

## 2024-11-05 NOTE — TELEPHONE ENCOUNTER
Medication Requested: Norco    Last visit: 10/10/2024  Next visit: 1/10/2025  Last refill: 10/23/2024    Med contract on file:  [] yes   [] no    Last urine drug screen: 2024  Consistent with medication(s):    [] yes   [] no    Last OARRS ran: unknown    Quantity of medication remainin    Who will be picking rx up: Patient    [facility] escrib[facility]    Patient went to pain management and they told him there is nothing they can do for him and was told to talk to PCP.

## 2024-11-07 LAB
6-ACETYLMORPHINE, UR: NOT DETECTED
7-AMINOCLONAZEPAM, URINE: NOT DETECTED
ALPHA-OH-ALPRAZ, URINE: NOT DETECTED
ALPHA-OH-MIDAZOLAM, URINE: NOT DETECTED
ALPRAZOLAM, URINE: NOT DETECTED
AMPHETAMINE, URINE: NOT DETECTED
BARBITURATES, URINE: NEGATIVE
BENZOYLECGONINE, UR: NEGATIVE
BUPRENORPHINE URINE: NOT DETECTED
CARISOPRODOL, UR: NEGATIVE
CLONAZEPAM, URINE: NOT DETECTED
CODEINE, URINE: NOT DETECTED
CREAT UR-MCNC: 136.6 MG/DL (ref 20–400)
DIAZEPAM, URINE: NOT DETECTED
DRUGS EXPECTED, UR: NORMAL
EER HI RES INTERP UR: NORMAL
ETHYL GLUCURONIDE UR: NEGATIVE
FENTANYL URINE: NOT DETECTED
GABAPENTIN: NOT DETECTED
HYDROCODONE, URINE: PRESENT
HYDROMORPHONE, URINE: PRESENT
LORAZEPAM, URINE: NOT DETECTED
MARIJUANA METAB, UR: NEGATIVE
MDA, URINE: NOT DETECTED
MDEA, EVE, UR: NOT DETECTED
MDMA, URINE: NOT DETECTED
MEPERIDINE METAB, UR: NOT DETECTED
METHADONE, URINE: NEGATIVE
METHAMPHETAMINE, URINE: NOT DETECTED
METHYLPHENIDATE: NOT DETECTED
MIDAZOLAM, URINE: NOT DETECTED
MORPHINE, OPI1M: NOT DETECTED
NALOXONE URINE: NOT DETECTED
NORBUPRENORPHINE, URINE: NOT DETECTED
NORDIAZEPAM, URINE: NOT DETECTED
NORFENTANYL, URINE: NOT DETECTED
NORHYDROCODONE, URINE: PRESENT
NOROXYCODONE, URINE: NOT DETECTED
NOROXYMORPHONE, URINE: NOT DETECTED
OXAZEPAM, URINE: NOT DETECTED
OXYCODONE URINE: NOT DETECTED
OXYMORPHONE, URINE: NOT DETECTED
PAIN MANAGEMENT DRUG PANEL INTERP, URINE: NORMAL
PAIN MGT DRUG PANEL, HI RES, UR: NORMAL
PCP,URINE: NEGATIVE
PHENTERMINE, UR: NOT DETECTED
PREGABALIN: PRESENT
TAPENTADOL, URINE: NOT DETECTED
TAPENTADOL-O-SULFATE, URINE: NOT DETECTED
TEMAZEPAM, URINE: NOT DETECTED
TRAMADOL, URINE: NEGATIVE
ZOLPIDEM METABOLITE (ZCA), URINE: NOT DETECTED
ZOLPIDEM, URINE: NOT DETECTED

## 2024-11-08 ENCOUNTER — HOSPITAL ENCOUNTER (OUTPATIENT)
Dept: MRI IMAGING | Age: 84
Discharge: HOME | End: 2024-11-10
Attending: STUDENT IN AN ORGANIZED HEALTH CARE EDUCATION/TRAINING PROGRAM
Payer: MEDICARE

## 2024-11-08 DIAGNOSIS — M54.12 CERVICAL RADICULOPATHY: ICD-10-CM

## 2024-11-08 PROCEDURE — 72141 MRI NECK SPINE W/O DYE: CPT

## 2024-11-13 ENCOUNTER — TELEPHONE (OUTPATIENT)
Dept: PAIN MANAGEMENT | Age: 84
End: 2024-11-13

## 2024-11-13 DIAGNOSIS — M47.817 OSTEOARTHRITIS OF LUMBOSACRAL SPINE: ICD-10-CM

## 2024-11-13 DIAGNOSIS — M47.12 OSTEOARTHRITIS OF CERVICAL SPINE WITH MYELOPATHY: ICD-10-CM

## 2024-11-13 DIAGNOSIS — I10 PRIMARY HYPERTENSION: ICD-10-CM

## 2024-11-13 RX ORDER — HYDROCODONE BITARTRATE AND ACETAMINOPHEN 10; 325 MG/1; MG/1
1 TABLET ORAL EVERY 8 HOURS PRN
Qty: 57 TABLET | Refills: 0 | Status: SHIPPED | OUTPATIENT
Start: 2024-11-13 | End: 2024-12-02

## 2024-11-13 NOTE — TELEPHONE ENCOUNTER
Patient stops by [facility] stating he is out of his pain medication; requesting refill.  Will route to MD.

## 2024-11-13 NOTE — TELEPHONE ENCOUNTER
Pt came in today requesting norco medication for pain, pt was seen at pain clinic but they will not start medication until after his second appt on 12/2/24, called pain clinic to verify they stated after the drug screen is done and the next appt the doctor will decide if they will take over medication, pt is asking for medication until appt, med pend

## 2024-11-21 ENCOUNTER — TELEPHONE (OUTPATIENT)
Dept: INTERNAL MEDICINE CLINIC | Age: 84
End: 2024-11-21

## 2024-11-21 DIAGNOSIS — M79.2 NEUROPATHIC PAIN: ICD-10-CM

## 2024-11-21 RX ORDER — PREGABALIN 100 MG/1
100 CAPSULE ORAL 2 TIMES DAILY
Qty: 22 CAPSULE | Refills: 0 | Status: SHIPPED | OUTPATIENT
Start: 2024-11-21 | End: 2024-12-02

## 2024-11-21 NOTE — TELEPHONE ENCOUNTER
Patient would like a refill of Lyrica to get him through until his next appt with pain management.     Please advise.

## 2024-12-02 ENCOUNTER — HOSPITAL ENCOUNTER (OUTPATIENT)
Dept: PAIN MANAGEMENT | Age: 84
Discharge: HOME | End: 2024-12-02
Payer: MEDICARE

## 2024-12-02 VITALS — BODY MASS INDEX: 30.73 KG/M2 | WEIGHT: 180 LBS | HEIGHT: 64 IN

## 2024-12-02 DIAGNOSIS — M79.18 MYOFASCIAL PAIN SYNDROME: ICD-10-CM

## 2024-12-02 DIAGNOSIS — Z79.891 CHRONIC USE OF OPIATE FOR THERAPEUTIC PURPOSE: ICD-10-CM

## 2024-12-02 DIAGNOSIS — M54.12 CERVICAL RADICULOPATHY: ICD-10-CM

## 2024-12-02 DIAGNOSIS — M79.2 NEUROPATHIC PAIN: ICD-10-CM

## 2024-12-02 DIAGNOSIS — M96.1 CERVICAL POST-LAMINECTOMY SYNDROME: Primary | ICD-10-CM

## 2024-12-02 PROCEDURE — 99213 OFFICE O/P EST LOW 20 MIN: CPT

## 2024-12-02 PROCEDURE — 99214 OFFICE O/P EST MOD 30 MIN: CPT | Performed by: STUDENT IN AN ORGANIZED HEALTH CARE EDUCATION/TRAINING PROGRAM

## 2024-12-02 RX ORDER — PREGABALIN 100 MG/1
100 CAPSULE ORAL 2 TIMES DAILY
Qty: 60 CAPSULE | Refills: 2 | Status: SHIPPED | OUTPATIENT
Start: 2024-12-02 | End: 2025-03-02

## 2024-12-02 RX ORDER — OXYCODONE AND ACETAMINOPHEN 10; 325 MG/1; MG/1
1 TABLET ORAL EVERY 8 HOURS PRN
Qty: 90 TABLET | Refills: 0 | Status: SHIPPED | OUTPATIENT
Start: 2024-12-02 | End: 2025-01-01

## 2024-12-02 ASSESSMENT — PAIN SCALES - GENERAL: PAINLEVEL_OUTOF10: 9

## 2024-12-02 NOTE — PROGRESS NOTES
Chronic Pain Clinic Note     Encounter Date: 2024     SUBJECTIVE:  Chief Complaint   Patient presents with    Neck Pain       History of Present Illness:   Nelson Marte is a 84 y.o. male who presents with neck pain.    Medication Refill: n/a    Current Complaints of Pain:   Location: neck  Radiation: Into left arm  Severity:  Moderate  Pain Numerical Score - 9/10 today     Average:  5    Highest: 5  Lowest:  4  Character/Quality: Complains of pain that is    Timing: Constant  Associated symptoms: none  Numbness:    Weakness:    Exacerbating factors: ADLs  Alleviating factors:  Norco   Length of time pain has been present: Started about 20 years ago   Inciting event/injury:    Bowel/Bladder incontinence:  no  Falls:  none in the past month   Physical Therapy: No - pt states he can't do PT, he failed     History of Interventions:   Surgery: No previous lumbar/cervical surgeries  Injections: years ago, pt is not sure who did them     Imaging:    MRI Cervical 11/10/2024    Past Medical History:   Diagnosis Date    Arthritis     Asthma     Hyperlipemia     Hypertension        Past Surgical History:   Procedure Laterality Date    SHOULDER ARTHROPLASTY Bilateral     TOTAL KNEE ARTHROPLASTY         History reviewed. No pertinent family history.    Social History     Socioeconomic History    Marital status:      Spouse name: Not on file    Number of children: Not on file    Years of education: Not on file    Highest education level: Not on file   Occupational History    Not on file   Tobacco Use    Smoking status: Former     Current packs/day: 0.00     Average packs/day: 1.5 packs/day for 20.0 years (30.0 ttl pk-yrs)     Types: Cigarettes     Start date:      Quit date:      Years since quittin.9    Smokeless tobacco: Never   Substance and Sexual Activity    Alcohol use: Yes     Alcohol/week: 1.0 standard drink of alcohol     Types: 1 Glasses of wine per week     Comment: COUPLE TIMES A WEEK WITH

## 2025-01-02 ENCOUNTER — TELEPHONE (OUTPATIENT)
Dept: PAIN MANAGEMENT | Age: 85
End: 2025-01-02

## 2025-01-02 NOTE — TELEPHONE ENCOUNTER
Patient and wife came into clinic at [place] yelling that patient was out of pills, that he only got 2 hours of sleep and that medication refill was needed at that moment or he was heading to the ER. Patient is scheduled for follow up on 01/06/25 with MD but states that he is unable to wait for medication refill. Patient was verbally aggressive and disruptive in waiting area. Please advise.

## 2025-01-03 ENCOUNTER — TELEPHONE (OUTPATIENT)
Dept: INTERNAL MEDICINE CLINIC | Age: 85
End: 2025-01-03

## 2025-01-03 ENCOUNTER — OFFICE VISIT (OUTPATIENT)
Dept: PAIN MANAGEMENT | Age: 85
End: 2025-01-03

## 2025-01-03 VITALS — HEIGHT: 64 IN | BODY MASS INDEX: 30.73 KG/M2 | WEIGHT: 180 LBS

## 2025-01-03 DIAGNOSIS — M79.18 MYOFASCIAL PAIN SYNDROME: ICD-10-CM

## 2025-01-03 DIAGNOSIS — I10 PRIMARY HYPERTENSION: ICD-10-CM

## 2025-01-03 DIAGNOSIS — M47.12 OSTEOARTHRITIS OF CERVICAL SPINE WITH MYELOPATHY: ICD-10-CM

## 2025-01-03 DIAGNOSIS — M47.817 OSTEOARTHRITIS OF LUMBOSACRAL SPINE: ICD-10-CM

## 2025-01-03 DIAGNOSIS — M54.12 CERVICAL RADICULOPATHY: ICD-10-CM

## 2025-01-03 DIAGNOSIS — Z79.891 CHRONIC USE OF OPIATE FOR THERAPEUTIC PURPOSE: Primary | ICD-10-CM

## 2025-01-03 DIAGNOSIS — M96.1 CERVICAL POST-LAMINECTOMY SYNDROME: ICD-10-CM

## 2025-01-03 DIAGNOSIS — M79.2 NEUROPATHIC PAIN: ICD-10-CM

## 2025-01-03 RX ORDER — HYDROCODONE BITARTRATE AND ACETAMINOPHEN 10; 325 MG/1; MG/1
1 TABLET ORAL EVERY 8 HOURS PRN
Qty: 15 TABLET | Refills: 0 | Status: SHIPPED | OUTPATIENT
Start: 2025-01-03 | End: 2025-01-10 | Stop reason: SDUPTHER

## 2025-01-03 NOTE — PROGRESS NOTES
analgesia and quality of life have been and will continue to be pursued.    Opioid Therapy: Education provided to patient regarding short term and long term implications of opioid medication use. Repeat opioid risk stratification today, discussion regarding functional achievements, safe storage, and optimization of non-opioid interventional, behavioral, and pharmaceutical modalities. Will continue attempt to wean off medication as appropriate.    Larry Burton,   Interventional Pain Management/PM&R   [place]    No orders of the defined types were placed in this encounter.

## 2025-01-03 NOTE — TELEPHONE ENCOUNTER
Wife is calling about the message.     Cyndi is very worried that the patient will not have any pain medication over the whole weekend. He is in extreme pain and is not sleeping.    She wants the Hydrocodone every 5 to 6 hrs. They will make an appt if they need to but would like enough to get through until then.         Please advise

## 2025-01-03 NOTE — TELEPHONE ENCOUNTER
Patients wife came into office and stated that patient went to pain management today and Dr. Burton told them \"he is washing his hands and I'm not giving you anymore prescription you need to go to Dr. Sauceda as he is licensed to prescribe those pills\". Patients wife states that patient is out of medication and is a lot of pain and not sleeping due to the pain. Please advise.

## 2025-01-04 NOTE — TELEPHONE ENCOUNTER
Refilled for now.   Patient will need to Follow-up with pain mgt, or will need new pain mgt if needs to be continued with pain control.   Pain mgt note reviewed

## 2025-01-06 NOTE — TELEPHONE ENCOUNTER
Patients wife came into the office is a requesting a new pain management referral to [facility]. Please review and advise.

## 2025-01-07 NOTE — TELEPHONE ENCOUNTER
Patients wife came into the office and stated that they went out to [facility] and they will only do injections. Patient does not want injections. Patients wife wanted to talk to Kimberley. Kimberley was not available at the moment. They said they will come back in a hour or so.

## 2025-01-08 NOTE — TELEPHONE ENCOUNTER
Patients wife reported to the office 1/7/25 to discuss Hydrocodone. She stated that patient has medication until 1/8/25 and is scheduled with Dr. Sauceda on 1/10/25. She is requesting additional refill for patient.     Explained to patients wife, Cyndi, that Dr. Sauceda is no longer filling medication and that patient will need to establish with a new Pain Clinic. Cyndi is very unhappy and stated that no doctor is listening and that patient has been on this medication for over 20 years and needs to have this every 6 hours due to his knee, shoulder, back and neck pain. I explained to Cyndi the risks involved with Nelson taking this medication at his age, long term. Cyndi stated that Nelson is fine and has no risks because he has taken this so long. Cyndi again, asked for writer to ask Dr. Sauceda for additional refills. Advised her to contact a pain management office for an appointment.

## 2025-01-10 ENCOUNTER — TELEPHONE (OUTPATIENT)
Dept: INTERNAL MEDICINE CLINIC | Age: 85
End: 2025-01-10

## 2025-01-10 ENCOUNTER — OFFICE VISIT (OUTPATIENT)
Dept: INTERNAL MEDICINE CLINIC | Age: 85
End: 2025-01-10

## 2025-01-10 VITALS
HEIGHT: 64 IN | DIASTOLIC BLOOD PRESSURE: 78 MMHG | SYSTOLIC BLOOD PRESSURE: 136 MMHG | WEIGHT: 184 LBS | HEART RATE: 104 BPM | BODY MASS INDEX: 31.41 KG/M2 | OXYGEN SATURATION: 98 %

## 2025-01-10 DIAGNOSIS — M47.817 OSTEOARTHRITIS OF LUMBOSACRAL SPINE: ICD-10-CM

## 2025-01-10 DIAGNOSIS — I10 PRIMARY HYPERTENSION: ICD-10-CM

## 2025-01-10 DIAGNOSIS — M47.12 OSTEOARTHRITIS OF CERVICAL SPINE WITH MYELOPATHY: Primary | ICD-10-CM

## 2025-01-10 DIAGNOSIS — F11.99 OPIOID USE, UNSPECIFIED WITH UNSPECIFIED OPIOID-INDUCED DISORDER (HCC): ICD-10-CM

## 2025-01-10 DIAGNOSIS — J44.9 CHRONIC OBSTRUCTIVE PULMONARY DISEASE, UNSPECIFIED COPD TYPE (HCC): ICD-10-CM

## 2025-01-10 RX ORDER — HYDROCODONE BITARTRATE AND ACETAMINOPHEN 10; 325 MG/1; MG/1
1 TABLET ORAL EVERY 8 HOURS PRN
Qty: 15 TABLET | Refills: 0 | Status: SHIPPED | OUTPATIENT
Start: 2025-01-10 | End: 2025-01-13 | Stop reason: SDUPTHER

## 2025-01-10 SDOH — ECONOMIC STABILITY: FOOD INSECURITY: WITHIN THE PAST 12 MONTHS, THE FOOD YOU BOUGHT JUST DIDN'T LAST AND YOU DIDN'T HAVE MONEY TO GET MORE.: NEVER TRUE

## 2025-01-10 SDOH — ECONOMIC STABILITY: FOOD INSECURITY: WITHIN THE PAST 12 MONTHS, YOU WORRIED THAT YOUR FOOD WOULD RUN OUT BEFORE YOU GOT MONEY TO BUY MORE.: NEVER TRUE

## 2025-01-10 ASSESSMENT — PATIENT HEALTH QUESTIONNAIRE - PHQ9
2. FEELING DOWN, DEPRESSED OR HOPELESS: NOT AT ALL
SUM OF ALL RESPONSES TO PHQ QUESTIONS 1-9: 0
SUM OF ALL RESPONSES TO PHQ9 QUESTIONS 1 & 2: 0
1. LITTLE INTEREST OR PLEASURE IN DOING THINGS: NOT AT ALL
SUM OF ALL RESPONSES TO PHQ QUESTIONS 1-9: 0

## 2025-01-10 NOTE — PROGRESS NOTES
[facility]  [address] [address]  Dept: [phone]  Dept Fax: [phone]    OFFICE VISIT NOTE  Date of patient's visit: 1/19/2025  Patient's Name:  Nelson Marte YOB: 1940            Patient Care Team:  Ami Sauceda MD as PCP - General (Internal Medicine)  Ami Sauceda MD as PCP - Empaneled Provider  Cheryl Castaneda MD as Consulting Physician (Gastroenterology)  _________________________________________    ________________________________________________  Chief Complaint:   Back Pain (Follow up, discuss med )    _______________________________________________  History of Presenting Illness:  History was obtained from the patient. Nelson Marte is a 84 y.o. male.     BP: normal  HR: normal    Follow-up   Coming in with his wife today    Lab Results   Component Value Date    HGB 15.3 02/02/2024    CHOL 126 02/02/2024    HDL 63 02/02/2024    LDL 44 02/02/2024    TRIG 94 02/02/2024    CREATININE 1.0 02/02/2024     _____________________________________________________  Past Medical/Surgical History:        Diagnosis Date    Arthritis     Asthma     Hyperlipemia     Hypertension            Procedure Laterality Date    SHOULDER ARTHROPLASTY Bilateral     TOTAL KNEE ARTHROPLASTY       _____________________________________________________  Health Maintenance Due   Topic Date Due    Pneumococcal 65+ years Vaccine (1 of 2 - PCV) Never done    DTaP/Tdap/Td vaccine (1 - Tdap) Never done    Shingles vaccine (1 of 2) Never done    Respiratory Syncytial Virus (RSV) Pregnant or age 60 yrs+ (1 - 1-dose 75+ series) Never done    Flu vaccine (1) 08/01/2024    COVID-19 Vaccine (1 - 2023-24 season) Never done    Annual Wellness Visit (Medicare Advantage)  01/01/2025    Lipids  02/02/2025       No Known Allergies      Current Outpatient Medications   Medication Sig Dispense Refill    pregabalin (LYRICA) 100 MG capsule Take 1 capsule by mouth 2 times

## 2025-01-13 RX ORDER — HYDROCODONE BITARTRATE AND ACETAMINOPHEN 10; 325 MG/1; MG/1
1 TABLET ORAL EVERY 8 HOURS PRN
Qty: 21 TABLET | Refills: 0 | Status: SHIPPED | OUTPATIENT
Start: 2025-01-13 | End: 2025-01-20

## 2025-01-13 NOTE — TELEPHONE ENCOUNTER
Patient stopped into the office to discuss referral and Norco.     Patient cannot see the new pain management provider for 2 weeks. He is requesting additional refill until then. Explained to patient that I cannot guarantee this and will call when a response if received for Dr. Sauceda. Patient stated his pain is unmanageable without Norco and does not want to end up in the ED but will go if he does not have pain medication. Patient has medication for today and tomorrow.     Please advise.

## 2025-01-14 ENCOUNTER — TELEPHONE (OUTPATIENT)
Dept: INTERNAL MEDICINE CLINIC | Age: 85
End: 2025-01-14

## 2025-01-14 DIAGNOSIS — M54.12 CERVICAL RADICULOPATHY: Primary | ICD-10-CM

## 2025-01-14 DIAGNOSIS — M79.18 MYOFASCIAL PAIN SYNDROME: ICD-10-CM

## 2025-01-14 DIAGNOSIS — M47.817 OSTEOARTHRITIS OF LUMBOSACRAL SPINE: ICD-10-CM

## 2025-01-14 NOTE — TELEPHONE ENCOUNTER
Wife states that the pain management that they were referred to does not give medication in pill form. Patient does not want shots.     Informed them to call insurance and find a pain management facility that is covered and gives what they want. Waiting on call back.

## 2025-01-14 NOTE — TELEPHONE ENCOUNTER
[facility] calling on the behalf of patient in request to be referred alternative pain management.      Dr Jeremy Garrison / [facility]  Fax#[phone]    [facility] [place] [address]

## 2025-01-15 NOTE — TELEPHONE ENCOUNTER
I faxed referral, ov and demographics to number provided.    I called patients wife and left voicemail letting her know I did this.

## 2025-01-15 NOTE — TELEPHONE ENCOUNTER
Patients wife is calling to report Nelson is scheduled with [facility] pain management 2/22/25. He is requesting Dr. Sauceda prescribe Norco until this appointment.     Please advise.

## 2025-01-16 NOTE — TELEPHONE ENCOUNTER
1st attempt- Unable to reach patient, or LVM.   Would like to advise pt to call office when he is almost out of medication to request refill.

## 2025-01-30 DIAGNOSIS — I10 PRIMARY HYPERTENSION: ICD-10-CM

## 2025-01-30 DIAGNOSIS — M47.817 OSTEOARTHRITIS OF LUMBOSACRAL SPINE: ICD-10-CM

## 2025-01-30 DIAGNOSIS — M47.12 OSTEOARTHRITIS OF CERVICAL SPINE WITH MYELOPATHY: ICD-10-CM

## 2025-01-30 RX ORDER — HYDROCODONE BITARTRATE AND ACETAMINOPHEN 10; 325 MG/1; MG/1
1 TABLET ORAL EVERY 8 HOURS PRN
Qty: 42 TABLET | Refills: 0 | Status: SHIPPED | OUTPATIENT
Start: 2025-02-03 | End: 2025-02-17

## 2025-01-30 NOTE — TELEPHONE ENCOUNTER
Patient's wife called early since we will be closed on the weekend and he will be ready for medication on the 3rd of February.    Please advise

## 2025-02-18 ENCOUNTER — TELEPHONE (OUTPATIENT)
Dept: INTERNAL MEDICINE CLINIC | Age: 85
End: 2025-02-18

## 2025-02-18 DIAGNOSIS — M47.817 OSTEOARTHRITIS OF LUMBOSACRAL SPINE: ICD-10-CM

## 2025-02-18 DIAGNOSIS — I10 PRIMARY HYPERTENSION: ICD-10-CM

## 2025-02-18 DIAGNOSIS — M47.12 OSTEOARTHRITIS OF CERVICAL SPINE WITH MYELOPATHY: ICD-10-CM

## 2025-02-18 RX ORDER — HYDROCODONE BITARTRATE AND ACETAMINOPHEN 10; 325 MG/1; MG/1
1 TABLET ORAL EVERY 8 HOURS PRN
Qty: 21 TABLET | Refills: 0 | Status: SHIPPED | OUTPATIENT
Start: 2025-02-18 | End: 2025-02-25

## 2025-02-18 NOTE — TELEPHONE ENCOUNTER
Patient and wife called into the office and stated that the patient seen Dr. Garrison and he is sending over a note to Dr. Sauceda and that Dr. Sauceda is to give one weeks worth of pain medication. Writer called Dr. Maloney's office spoke with Farida who stated that their office does plan to take over the patients medication but they have to have a drug screen on file first and they ship it out, they are asking for us to the give one week of medication for the drug screen to come back. Please advise.

## 2025-03-10 DIAGNOSIS — J44.9 CHRONIC OBSTRUCTIVE PULMONARY DISEASE, UNSPECIFIED COPD TYPE (HCC): Primary | ICD-10-CM

## 2025-03-10 RX ORDER — ALBUTEROL SULFATE 90 UG/1
2 INHALANT RESPIRATORY (INHALATION) 4 TIMES DAILY
Qty: 12 EACH | Refills: 0 | Status: SHIPPED | OUTPATIENT
Start: 2025-03-10 | End: 2025-06-08

## 2025-03-12 ENCOUNTER — TELEPHONE (OUTPATIENT)
Dept: INTERNAL MEDICINE CLINIC | Age: 85
End: 2025-03-12

## 2025-03-12 DIAGNOSIS — J44.9 CHRONIC OBSTRUCTIVE PULMONARY DISEASE, UNSPECIFIED COPD TYPE (HCC): Primary | ICD-10-CM

## 2025-03-12 RX ORDER — FLUTICASONE FUROATE, UMECLIDINIUM BROMIDE AND VILANTEROL TRIFENATATE 200; 62.5; 25 UG/1; UG/1; UG/1
1 POWDER RESPIRATORY (INHALATION) DAILY
Qty: 3 EACH | Refills: 0 | Status: SHIPPED | OUTPATIENT
Start: 2025-03-12 | End: 2025-03-14 | Stop reason: SDUPTHER

## 2025-03-12 NOTE — TELEPHONE ENCOUNTER
Patients wife calling to report Nebulizer machine broke.   Pt also needs refill for Trelegy.     Orders pending, will fax DME order to [facility] once signed.

## 2025-03-12 NOTE — TELEPHONE ENCOUNTER
----- Message from Kay Castorena sent at 9/19/2018  9:50 AM CDT -----  Contact: self  Patient state the medication will be delivered tomorrow and would like to schedule appointment on Friday with Supriya. Please call patient at [phone]. Thanks!   Order signed

## 2025-03-14 DIAGNOSIS — J44.9 CHRONIC OBSTRUCTIVE PULMONARY DISEASE, UNSPECIFIED COPD TYPE (HCC): ICD-10-CM

## 2025-03-14 RX ORDER — FLUTICASONE FUROATE, UMECLIDINIUM BROMIDE AND VILANTEROL TRIFENATATE 200; 62.5; 25 UG/1; UG/1; UG/1
1 POWDER RESPIRATORY (INHALATION) DAILY
Qty: 3 EACH | Refills: 0 | Status: SHIPPED | OUTPATIENT
Start: 2025-03-14 | End: 2025-06-12

## 2025-03-14 RX ORDER — ALBUTEROL SULFATE 90 UG/1
INHALANT RESPIRATORY (INHALATION)
Qty: 8.5 G | OUTPATIENT
Start: 2025-03-14

## 2025-03-14 NOTE — TELEPHONE ENCOUNTER
Patient is requesting 90 day supply of Trelegy to be sent to [facility].   Order pending, please review and sign.     Patients wife also stated they only were given one Albuterol inhaler from the mail pharmacy and not a 90 day. Spoke with the pharmacist who agreed to correct this order and mail the additional medication to the patient.

## 2025-04-04 DIAGNOSIS — J44.9 CHRONIC OBSTRUCTIVE PULMONARY DISEASE, UNSPECIFIED COPD TYPE (HCC): ICD-10-CM

## 2025-04-04 RX ORDER — ALBUTEROL SULFATE 90 UG/1
2 INHALANT RESPIRATORY (INHALATION) 4 TIMES DAILY
Qty: 12 EACH | Refills: 0 | Status: SHIPPED | OUTPATIENT
Start: 2025-04-04 | End: 2025-07-03

## 2025-05-06 ENCOUNTER — TELEPHONE (OUTPATIENT)
Dept: INTERNAL MEDICINE CLINIC | Age: 85
End: 2025-05-06

## 2025-06-08 DIAGNOSIS — Z13.220 SCREENING FOR HYPERLIPIDEMIA: ICD-10-CM

## 2025-06-09 RX ORDER — SIMVASTATIN 40 MG
40 TABLET ORAL NIGHTLY
Qty: 100 TABLET | Refills: 2 | Status: SHIPPED | OUTPATIENT
Start: 2025-06-09

## 2025-07-28 DIAGNOSIS — J44.9 CHRONIC OBSTRUCTIVE PULMONARY DISEASE, UNSPECIFIED COPD TYPE (HCC): ICD-10-CM

## 2025-07-28 RX ORDER — ALBUTEROL SULFATE 90 UG/1
INHALANT RESPIRATORY (INHALATION)
Qty: 34 G | Refills: 2 | Status: SHIPPED | OUTPATIENT
Start: 2025-07-28

## 2025-08-08 ENCOUNTER — OFFICE VISIT (OUTPATIENT)
Dept: INTERNAL MEDICINE CLINIC | Age: 85
End: 2025-08-08
Payer: MEDICARE

## 2025-08-08 VITALS
DIASTOLIC BLOOD PRESSURE: 60 MMHG | WEIGHT: 180 LBS | HEART RATE: 61 BPM | OXYGEN SATURATION: 98 % | HEIGHT: 64 IN | BODY MASS INDEX: 30.73 KG/M2 | SYSTOLIC BLOOD PRESSURE: 122 MMHG

## 2025-08-08 DIAGNOSIS — Z00.00 MEDICARE ANNUAL WELLNESS VISIT, SUBSEQUENT: Primary | ICD-10-CM

## 2025-08-08 DIAGNOSIS — Z13.220 SCREENING FOR HYPERLIPIDEMIA: ICD-10-CM

## 2025-08-08 DIAGNOSIS — J44.9 CHRONIC OBSTRUCTIVE PULMONARY DISEASE, UNSPECIFIED COPD TYPE (HCC): ICD-10-CM

## 2025-08-08 PROCEDURE — G0439 PPPS, SUBSEQ VISIT: HCPCS

## 2025-08-08 PROCEDURE — 3078F DIAST BP <80 MM HG: CPT

## 2025-08-08 PROCEDURE — 3074F SYST BP LT 130 MM HG: CPT

## 2025-08-08 PROCEDURE — 1123F ACP DISCUSS/DSCN MKR DOCD: CPT

## 2025-08-08 PROCEDURE — 1159F MED LIST DOCD IN RCRD: CPT

## 2025-08-08 RX ORDER — IBUPROFEN 800 MG/1
800 TABLET, FILM COATED ORAL 3 TIMES DAILY
COMMUNITY
Start: 2025-07-14 | End: 2025-08-13

## 2025-08-08 RX ORDER — CHLORAL HYDRATE 500 MG
1 CAPSULE ORAL EVERY 24 HOURS
COMMUNITY

## 2025-08-08 RX ORDER — MULTIVITAMIN WITH IRON
1 TABLET ORAL EVERY MORNING
COMMUNITY

## 2025-08-08 RX ORDER — ZINC GLUCONATE 50 MG
TABLET ORAL
COMMUNITY

## 2025-08-08 ASSESSMENT — ENCOUNTER SYMPTOMS
CONSTIPATION: 0
BACK PAIN: 0
NAUSEA: 0
COUGH: 0
ALLERGIC/IMMUNOLOGIC NEGATIVE: 1
ABDOMINAL PAIN: 0
SHORTNESS OF BREATH: 0

## 2025-08-08 ASSESSMENT — PATIENT HEALTH QUESTIONNAIRE - PHQ9
SUM OF ALL RESPONSES TO PHQ QUESTIONS 1-9: 2
2. FEELING DOWN, DEPRESSED OR HOPELESS: SEVERAL DAYS
1. LITTLE INTEREST OR PLEASURE IN DOING THINGS: SEVERAL DAYS
SUM OF ALL RESPONSES TO PHQ QUESTIONS 1-9: 2
